# Patient Record
Sex: MALE | Race: WHITE | Employment: FULL TIME | URBAN - METROPOLITAN AREA
[De-identification: names, ages, dates, MRNs, and addresses within clinical notes are randomized per-mention and may not be internally consistent; named-entity substitution may affect disease eponyms.]

---

## 2018-01-15 NOTE — PROGRESS NOTES
Assessment    1  Well child visit (V20 2) (Z00 129)    Plan  Health Maintenance    · Always use a seat belt and shoulder strap when riding or driving a motor vehicle ;  Status:Complete;   Done: [id number]   · Be sure your child gets at least 8 hours of sleep every night ; Status:Complete;   Done:  87AOL1794   · Begin or continue regular aerobic exercise  Gradually work up to at least 3 sessions of 30  minutes of exercise a week ; Status:Complete;   Done: 72KKG7491   · Brush your teeth freq1 and floss at least once a day ; Status:Complete;   Done:  86FXG2238   · Eat a normal well-balanced diet ; Status:Complete;   Done: 17UVM1770   · Good hand washing is one of the best ways to control the spread of germs ;  Status:Complete;   Done: 85EYD7738   · Protect your child with these gun safety rules ; Status:Complete;   Done: 63FRS6725   · There are many ways to reduce your risk of catching or spreading a sexually transmitted  Infection ; Status:Complete;   Done: 62VXY8696   · To prevent head injury, wear a helmet for any activity where you could be struck on the  head or fall on your head ; Status:Complete;   Done: 24BUN5521   · Use appropriate protective gear for your sport or work ; Status:Complete;   Done:  33AVC4237   · Using a latex condom can help prevent pregnancy  It can also help to prevent the spread  of sexually transmitted infections ; Status:Complete;   Done: 32QBZ9317   · When and how to use a seat belt for a child ; Status:Complete;   Done: 98WMF6325    Discussion/Summary    Impression:   No growth, development, elimination, feeding, skin and sleep concerns  no medical problems  Anticipatory guidance addressed as per the history of present illness section  No vaccines needed  vyvanse 50mg No medication changes  Information discussed with patient and Parent/Guardian       53-year-old boy here for school physical annual examination  Only active problems ADHD for which she takes medication  Otherwise there are no express peripheral concerns and no express concerns on the part of the patient  His examination is normal  His immunizations are up-to-date  He has no restrictions or limitations  Chief Complaint  School Physical 14year old male      History of Present Illness  HM, 12-18 years Male (Brief): Edward Reyes presents today for routine health maintenance with his Patient here alone  Social History: He lives with his mother, father, 3 brothers and 2 sisters  His parents are   father has full custody  mom works outside the home  dad works outside the home  General Health: The last health maintenance visit was 2 years ago  The child's health since the last visit is described as good   no illness since last visit  Dental hygiene: Good  Immunization status: Up to date  Caregiver concerns:   Caregivers deny concerns regarding nutrition, sleep, behavior, school, development and elimination  Nutrition/Elimination:   Diet:  his current diet is diverse and healthy  The patient does not use dietary supplements  No elimination issues are expressed  Sleep:  No sleep issues are reported  Behavior: The child's temperament is described as independent  No behavior issues identified  Health Risks:  No significant risk factors are identified  Safety elements used:   safety elements were discussed and are adequate  Weekly activity: Plays basketball hour(s) of exercise per day and he gets exercise 5 times per week  Childcare/School: The child stays home alone  He is in grade 11 in high school  School performance has been good  Sports Participation Questions:   HPI: 49-year-old boy here for school physical annual physical  Only active problems ADHD he is taking medication  He has no concerns about his health and he does not express any parental concerns       Review of Systems    Constitutional: No complaints of tiredness, feels well, no fever, no chills, no recent weight gain or loss     Eyes: No complaints of eye pain, no discharge from eyes, no eyesight problems, eyes do not itch, no red or dry eyes  ENT: no complaints of nasal discharge, no earache, no loss of hearing, no hoarseness or sore throat, no nosebleeds  Cardiovascular: No complaints of chest pain, no palpitations, normal heart rate, no leg claudication or lower leg edema  Respiratory: No complaints of shortness of breath, no wheezing or cough, no dyspnea on exertion  Gastrointestinal: No complaints of abdominal pain, no nausea or vomiting, no constipation, no diarrhea or bloody stools  Genitourinary: No complaints of testicular pain, no dysuria or nocturia, no incontinence, no hesitancy, no gential lesion  Musculoskeletal: No complaints of joint stiffness or swelling, no myalgias, no limb pain or swelling  Integumentary: No complaints of skin rash, no skin lesions or wounds, no itching, no dry skin  Neurological: No complaints of headache, no numbness or tingling, no dizziness or fainting, no confusion, no convulsions, no limb weakness or difficulty walking  Psychiatric: No complaints of feeling depressed, no suicidal thoughts, no emotional problems, no anxiety, no sleep disturbances or changes in personality  Endocrine: No complaints of muscle weakness, no feelings of weakness, no erectile dysfunction, no deepening of voice, no hot flashes or proptosis  Hematologic/Lymphatic: No complaints of swollen glands, no neck swollen glands, does not bleed or bruise easily  ROS reported by the patient  Active Problems    1   Attention deficit disorder without hyperactivity (314 00) (F90 0)    Past Medical History    · History of Contact dermatitis due to poison ivy (692 6) (L23 7)   · History of Difficulty breathing (786 09) (R06 89)   · History of Finger Sprain (842 13)   · History of fracture of phalanx of toe (V15 51) (Z87 81)   · History of Toe injury (959 7) (Z04 950A)    Family History    · No pertinent family history · No pertinent family history    · Family history of diabetes mellitus (V18 0) (Z83 3)    Social History    · Never A Smoker   · Single   · Student    Current Meds   1  Vyvanse 50 MG Oral Capsule; Therapy: 97HTT3942 to (Last Rx:06Jan2012)  Requested for: 98RDO6838 Ordered    Allergies    1  No Known Drug Allergies    Vitals   Recorded: 20POL3528 03:26PM   Temperature 98 6 F, Tympanic   Heart Rate 60, L Radial   Pulse Quality Regular   Systolic 071, LUE, Sitting   Diastolic 66, LUE, Sitting   Height 5 ft 10 5 in   Weight 169 lb    BMI Calculated 23 91   BSA Calculated 1 96     Physical Exam    Constitutional - General appearance: No acute distress, well appearing and well nourished  Head and Face - Head and face: Normocephalic, atraumatic  Palpation of the face and sinuses: Normal, no sinus tenderness  Eyes - Conjunctiva and lids: No injection, edema or discharge  Pupils and irises: Equal, round, reactive to light bilaterally  Ears, Nose, Mouth, and Throat - External inspection of ears and nose: Normal without deformities or discharge  Otoscopic examination: Tympanic membranes gray, translucent with good bony landmarks and light reflex  Canals patent without erythema  Hearing: Normal  Nasal mucosa, septum, and turbinates: Normal, no edema or discharge  Lips, teeth, and gums: Normal, good dentition  Oropharynx: Moist mucosa, normal tongue and tonsils without lesions  Neck - Neck: Supple, symmetric, no masses  Thyroid: No thyromegaly  Pulmonary - Respiratory effort: Normal respiratory rate and rhythm, no increased work of breathing  Percussion of chest: Normal  Palpation of chest: Normal  Auscultation of lungs: Clear bilaterally  Cardiovascular - Palpation of heart: Normal PMI, no thrill  Auscultation of heart: Regular rate and rhythm, normal S1 and S2, no murmur  Carotid pulses: Normal, 2+ bilaterally  Abdominal aorta: Normal  Femoral pulses: Normal, 2+ bilaterally  Pedal pulses: Normal, 2+ bilaterally  Peripheral vascular exam: Normal  Examination of extremities for edema and/or varicosities: Normal    Chest - Breasts: Normal  Palpation of breasts and axillae: Normal  Chest: Normal    Abdomen - Abdomen: Normal bowel sounds, soft, non-tender, no masses  Liver and spleen: No hepatomegaly or splenomegaly  Examination for hernias: No hernias palpated  Genitourinary - Scrotal contents: Normal, no masses appreciated  Penis: Normal, no lesions  Digital rectal exam of prostate: Normal size, no masses  Lymphatic - Palpation of lymph nodes in neck: No anterior or posterior cervical lymphadenopathy  Palpation of lymph nodes in axillae: No lymphadenopathy  Palpation of lymph nodes in groin: No lymphadenopathy  Palpation of lymph nodes in other areas: No lymphadenopathy  Musculoskeletal - Gait and station: Normal gait  Digits and nails: Normal without clubbing or cyanosis  Inspection/palpation of joints, bones, and muscles: Normal  Evaluation for scoliosis: No scoliosis on exam  Range of motion: Normal  Stability: No joint instability  Muscle strength/tone: Normal    Skin - Skin and subcutaneous tissue: No rash or lesions  Palpation of skin and subcutaneous tissue: Normal    Neurologic - Cranial nerves: Normal  Cortical function: Normal  Reflexes: Normal  Sensation: Normal    Psychiatric - judgment and insight: Normal  Orientation to person, place, and time: Normal  Recent and remote memory: Normal  Mood and affect: Normal       Results/Data  Urine Dip Non-Automated- POC [id number] 03:49PM Radha Chinchilla     Test Name Result Flag Reference   Color Yellow     Clarity Transparent     Leukocytes neg  Nitrite neg  Blood neg  Bilirubin neg  Urobilinogen normal     Protein neg  Ph 5 0     Specific Gravity 1 030     Ketone neg  Glucose neg           Procedure    Procedure:   Results: 20/25 in both eyes without corrective device, 20/25 in the right eye without corrective device, 20/25 in the left eye without corrective device      Signatures   Electronically signed by : GRECIA Keane ; Feb 1 2016  4:14PM EST                       (Author)

## 2018-01-19 ENCOUNTER — OFFICE VISIT (OUTPATIENT)
Dept: URGENT CARE | Facility: CLINIC | Age: 18
End: 2018-01-19
Payer: COMMERCIAL

## 2018-01-19 PROCEDURE — 87430 STREP A AG IA: CPT

## 2018-01-19 PROCEDURE — 99203 OFFICE O/P NEW LOW 30 MIN: CPT
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
## 2018-01-23 VITALS
HEART RATE: 82 BPM | DIASTOLIC BLOOD PRESSURE: 70 MMHG | TEMPERATURE: 97.3 F | RESPIRATION RATE: 16 BRPM | HEIGHT: 71 IN | BODY MASS INDEX: 24.5 KG/M2 | SYSTOLIC BLOOD PRESSURE: 122 MMHG | OXYGEN SATURATION: 98 % | WEIGHT: 175 LBS

## 2018-09-25 ENCOUNTER — OFFICE VISIT (OUTPATIENT)
Dept: FAMILY MEDICINE CLINIC | Facility: CLINIC | Age: 18
End: 2018-09-25

## 2018-09-25 VITALS
WEIGHT: 169 LBS | HEART RATE: 82 BPM | TEMPERATURE: 97.4 F | DIASTOLIC BLOOD PRESSURE: 72 MMHG | HEIGHT: 71 IN | BODY MASS INDEX: 23.66 KG/M2 | OXYGEN SATURATION: 97 % | SYSTOLIC BLOOD PRESSURE: 120 MMHG
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
## 2018-09-25 DIAGNOSIS — S09.93XA FACIAL INJURY, INITIAL ENCOUNTER: Primary | ICD-10-CM

## 2018-09-25 PROCEDURE — 99213 OFFICE O/P EST LOW 20 MIN: CPT | Performed by: FAMILY MEDICINE

## 2018-09-25 RX ORDER — IBUPROFEN 600 MG/1
600 TABLET ORAL AS NEEDED
COMMUNITY
Start: 2018-09-24 | End: 2018-11-07

## 2018-09-25 RX ORDER — PENICILLIN V POTASSIUM 500 MG/1
TABLET ORAL EVERY 8 HOURS SCHEDULED
COMMUNITY
Start: 2018-09-24 | End: 2018-10-05

## 2018-09-25 RX ORDER — CYCLOBENZAPRINE HCL 10 MG
10 TABLET ORAL AS NEEDED
COMMUNITY
Start: 2018-09-24 | End: 2018-11-07
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
## 2018-10-03 ENCOUNTER — OFFICE VISIT (OUTPATIENT)
Dept: FAMILY MEDICINE CLINIC | Facility: CLINIC | Age: 18
End: 2018-10-03
Payer: OTHER MISCELLANEOUS

## 2018-10-03 VITALS
SYSTOLIC BLOOD PRESSURE: 106 MMHG | BODY MASS INDEX: 23.8 KG/M2 | RESPIRATION RATE: 10 BRPM | HEART RATE: 72 BPM | HEIGHT: 71 IN | WEIGHT: 170 LBS | DIASTOLIC BLOOD PRESSURE: 78 MMHG

## 2018-10-03 DIAGNOSIS — Z48.02 VISIT FOR SUTURE REMOVAL: ICD-10-CM

## 2018-10-03 DIAGNOSIS — S01.81XD FACIAL LACERATION, SUBSEQUENT ENCOUNTER: Primary | ICD-10-CM

## 2018-10-03 PROCEDURE — 99213 OFFICE O/P EST LOW 20 MIN: CPT | Performed by: FAMILY MEDICINE
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
## 2018-10-08 ENCOUNTER — TELEPHONE (OUTPATIENT)
Dept: FAMILY MEDICINE CLINIC | Facility: CLINIC | Age: 18
End: 2018-10-08

## 2018-10-08 NOTE — TELEPHONE ENCOUNTER
Yamileth Iniguez from [facility], [facility]  insurance received a fax of the office visits from 9/25 and 10/3 would like to know what the patient's duty status is as of appointment on 9/25 and 10/3  Yamileth Iniguez can be reached at [phone] or fax the duty status to [phone]

## 2018-11-03 ENCOUNTER — OFFICE VISIT (OUTPATIENT)
Dept: FAMILY MEDICINE CLINIC | Facility: CLINIC | Age: 18
End: 2018-11-03
Payer: COMMERCIAL

## 2018-11-03 VITALS
SYSTOLIC BLOOD PRESSURE: 140 MMHG | HEART RATE: 97 BPM | DIASTOLIC BLOOD PRESSURE: 80 MMHG | BODY MASS INDEX: 21.14 KG/M2 | WEIGHT: 151.6 LBS | TEMPERATURE: 98.3 F

## 2018-11-03 DIAGNOSIS — R35.0 FREQUENT URINATION: ICD-10-CM

## 2018-11-03 DIAGNOSIS — R53.83 FATIGUE, UNSPECIFIED TYPE: Primary | ICD-10-CM

## 2018-11-03 DIAGNOSIS — R10.13 EPIGASTRIC PAIN: ICD-10-CM

## 2018-11-03 PROCEDURE — 99214 OFFICE O/P EST MOD 30 MIN: CPT | Performed by: NURSE PRACTITIONER

## 2018-11-03 RX ORDER — OMEPRAZOLE 20 MG/1
20 CAPSULE, DELAYED RELEASE ORAL DAILY
Qty: 14 CAPSULE | Refills: 0 | Status: SHIPPED | OUTPATIENT
Start: 2018-11-03 | End: 2018-11-07

## 2018-11-03 NOTE — PROGRESS NOTES
Assessment/Plan   Diagnoses and all orders for this visit:    Fatigue, unspecified type  -     CBC and differential; Future  -     Comprehensive metabolic panel; Future  -     Lyme Antibody Profile with reflex to WB; Future  -     Sedimentation rate, automated; Future  -     TSH, 3rd generation with Free T4 reflex; Future  -     Vitamin B12; Future  -     US abdomen complete; Future  -     CBC and differential  -     Comprehensive metabolic panel  -     Lyme Antibody Profile with reflex to WB  -     Sedimentation rate, automated  -     Vitamin B12  -     TSH, 3rd generation with Free T4 reflex; Future  -     TSH, 3rd generation with Free T4 reflex    Epigastric pain  -     CBC and differential; Future  -     Comprehensive metabolic panel; Future  -     Amylase; Future  -     Lipase; Future  -     CBC and differential  -     Comprehensive metabolic panel  -     Amylase  -     Lipase  -     omeprazole (PriLOSEC) 20 mg delayed release capsule; Take 1 capsule (20 mg total) by mouth daily for 14 days    Frequent urination  -     HEMOGLOBIN A1C W/ EAG ESTIMATION; Future  -     HEMOGLOBIN A1C W/ EAG ESTIMATION        Chief Complaint   Patient presents with    Fatigue     Always tired, dizzy, nausea for 2 weeks  When he belches chest hurts  Subjective   Patient ID: Chris Lopez is a 19 y o  male  Vitals:    11/03/18 1007   BP: 140/80   Pulse: 97   Temp: 98 3 °F (36 8 °C)     Heartburn   He complains of abdominal pain, heartburn and nausea  He reports no belching, no chest pain, no choking, no coughing, no dysphagia, no early satiety, no globus sensation, no sore throat, no stridor, no tooth decay, no water brash or no wheezing  This is a new problem  Episode onset: 2 weeks  The problem occurs frequently  The problem has been unchanged  Heartburn duration: occurs with belching then dissipates  The heartburn is located in the substernum  The heartburn is of moderate intensity   The heartburn does not wake him from sleep  The heartburn does not limit his activity  The heartburn doesn't change with position  Exacerbated by: belching  Associated symptoms include fatigue and weight loss  Pertinent negatives include no melena  There are no known risk factors  He has tried nothing for the symptoms  none  Fatigue   This is a new problem  Episode onset: 2 weeks  The problem occurs constantly  The problem has been unchanged  Associated symptoms include abdominal pain, a change in bowel habit (constipated - reports 1 stool this week), fatigue, nausea and vertigo  Pertinent negatives include no arthralgias, chest pain, coughing, fever, joint swelling, myalgias, rash, sore throat, swollen glands, urinary symptoms, visual change, vomiting or weakness  Nothing aggravates the symptoms  He has tried nothing for the symptoms  The following portions of the patient's history were reviewed and updated as appropriate: allergies, current medications, past family history, past social history, past surgical history and problem list     Review of Systems   Constitutional: Positive for fatigue, unexpected weight change and weight loss  Negative for fever  HENT: Negative  Negative for sore throat  Eyes: Negative  Respiratory: Negative  Negative for cough, choking and wheezing  Cardiovascular: Negative  Negative for chest pain and palpitations  Gastrointestinal: Positive for abdominal pain, change in bowel habit (constipated - reports 1 stool this week), constipation, heartburn and nausea  Negative for dysphagia, melena and vomiting  Endocrine: Positive for polyuria  Genitourinary: Negative  Negative for discharge, dysuria, flank pain, frequency, hematuria, penile pain, testicular pain and urgency  Musculoskeletal: Negative  Negative for arthralgias, joint swelling and myalgias  Skin: Negative  Negative for rash  Allergic/Immunologic: Negative  Neurological: Positive for vertigo  Negative for weakness  Hematological: Negative  Psychiatric/Behavioral: Negative  Objective     Physical Exam   Constitutional: He is oriented to person, place, and time  He appears well-developed and well-nourished  No distress  HENT:   Head: Normocephalic and atraumatic  Right Ear: External ear normal    Left Ear: External ear normal    Nose: Nose normal    Mouth/Throat: Oropharynx is clear and moist  No oropharyngeal exudate  Eyes: Pupils are equal, round, and reactive to light  Conjunctivae and EOM are normal  Right eye exhibits no discharge  Left eye exhibits no discharge  Neck: Normal range of motion  Neck supple  No thyromegaly present  Cardiovascular: Normal rate, regular rhythm, normal heart sounds and intact distal pulses  No murmur heard  Pulmonary/Chest: Effort normal and breath sounds normal  No respiratory distress  Abdominal: Soft  Bowel sounds are normal  He exhibits no distension, no pulsatile liver, no fluid wave, no abdominal bruit, no ascites, no pulsatile midline mass and no mass  There is no hepatosplenomegaly, splenomegaly or hepatomegaly  There is tenderness in the epigastric area  There is no rigidity, no rebound, no guarding, no CVA tenderness, no tenderness at McBurney's point and negative Yates's sign  No hernia  Hernia confirmed negative in the ventral area  Musculoskeletal: Normal range of motion  He exhibits no edema or tenderness  Lymphadenopathy:     He has no cervical adenopathy  Neurological: He is alert and oriented to person, place, and time  No cranial nerve deficit  Coordination normal    Skin: Skin is warm and dry  Capillary refill takes less than 2 seconds  He is not diaphoretic  No erythema  No pallor  Psychiatric: He has a normal mood and affect  His behavior is normal  Judgment and thought content normal    Nursing note and vitals reviewed  No Known Allergies  There is no problem list on file for this patient        Current Outpatient Prescriptions:    cyclobenzaprine (FLEXERIL) 10 mg tablet, Take 10 mg by mouth as needed  , Disp: , Rfl:     ibuprofen (MOTRIN) 600 mg tablet, Take 600 mg by mouth as needed  , Disp: , Rfl:     omeprazole (PriLOSEC) 20 mg delayed release capsule, Take 1 capsule (20 mg total) by mouth daily for 14 days, Disp: 14 capsule, Rfl: 0  Social History     Social History    Marital status: Single     Spouse name: N/A    Number of children: N/A    Years of education: N/A     Occupational History    Not on file       Social History Main Topics    Smoking status: Never Smoker    Smokeless tobacco: Never Used    Alcohol use No    Drug use: No    Sexual activity: Not on file     Other Topics Concern    Not on file     Social History Narrative    Single     Student      Family History   Problem Relation Age of Onset    Diabetes Family

## 2018-11-03 NOTE — PATIENT INSTRUCTIONS
1  Obtain labs  2  Schedule US of abdomen  3  Take omeprazole daily   4   Schedule follow up appointment for 2 weeks

## 2018-11-06 ENCOUNTER — HOSPITAL ENCOUNTER (OUTPATIENT)
Dept: ULTRASOUND IMAGING | Facility: CLINIC | Age: 18
Discharge: HOME | End: 2018-11-06
Payer: COMMERCIAL

## 2018-11-06 DIAGNOSIS — R53.83 FATIGUE, UNSPECIFIED TYPE: ICD-10-CM

## 2018-11-06 LAB
ALBUMIN SERPL-MCNC: 5.2 G/DL (ref 3.5–5.5)
ALBUMIN/GLOB SERPL: 2.3 {RATIO} (ref 1.2–2.2)
ALP SERPL-CCNC: 94 IU/L (ref 39–117)
ALT SERPL-CCNC: 17 IU/L (ref 0–44)
AMYLASE SERPL-CCNC: 46 U/L (ref 31–124)
AST SERPL-CCNC: 17 IU/L (ref 0–40)
B BURGDOR IGG+IGM SER-ACNC: <0.91 ISR (ref 0–0.9)
BASOPHILS # BLD AUTO: 0 X10E3/UL (ref 0–0.2)
BASOPHILS NFR BLD AUTO: 0 %
BILIRUB SERPL-MCNC: 0.5 MG/DL (ref 0–1.2)
BUN SERPL-MCNC: 11 MG/DL (ref 6–20)
BUN/CREAT SERPL: 9 (ref 9–20)
CALCIUM SERPL-MCNC: 10.4 MG/DL (ref 8.7–10.2)
CHLORIDE SERPL-SCNC: 95 MMOL/L (ref 96–106)
CO2 SERPL-SCNC: 7 MMOL/L (ref 20–29)
CREAT SERPL-MCNC: 1.24 MG/DL (ref 0.76–1.27)
EOSINOPHIL # BLD AUTO: 0 X10E3/UL (ref 0–0.4)
EOSINOPHIL NFR BLD AUTO: 0 %
ERYTHROCYTE [DISTWIDTH] IN BLOOD BY AUTOMATED COUNT: 13.4 % (ref 12.3–15.4)
ERYTHROCYTE [SEDIMENTATION RATE] IN BLOOD BY WESTERGREN METHOD: 7 MM/HR (ref 0–15)
EST. AVERAGE GLUCOSE BLD GHB EST-MCNC: 344 MG/DL
GLOBULIN SER-MCNC: 2.3 G/DL (ref 1.5–4.5)
GLUCOSE SERPL-MCNC: 203 MG/DL (ref 65–99)
HBA1C MFR BLD: 13.6 % (ref 4.8–5.6)
HCT VFR BLD AUTO: 49.8 % (ref 37.5–51)
HGB BLD-MCNC: 18 G/DL (ref 13–17.7)
IMM GRANULOCYTES # BLD: 0 X10E3/UL (ref 0–0.1)
IMM GRANULOCYTES NFR BLD: 1 %
LIPASE SERPL-CCNC: 25 U/L (ref 13–78)
LYMPHOCYTES # BLD AUTO: 1.7 X10E3/UL (ref 0.7–3.1)
LYMPHOCYTES NFR BLD AUTO: 25 %
MCH RBC QN AUTO: 30.7 PG (ref 26.6–33)
MCHC RBC AUTO-ENTMCNC: 36.1 G/DL (ref 31.5–35.7)
MCV RBC AUTO: 85 FL (ref 79–97)
MONOCYTES # BLD AUTO: 0.5 X10E3/UL (ref 0.1–0.9)
MONOCYTES NFR BLD AUTO: 8 %
NEUTROPHILS # BLD AUTO: 4.3 X10E3/UL (ref 1.4–7)
NEUTROPHILS NFR BLD AUTO: 66 %
PLATELET # BLD AUTO: 307 X10E3/UL (ref 150–379)
POTASSIUM SERPL-SCNC: 4 MMOL/L (ref 3.5–5.2)
PROT SERPL-MCNC: 7.5 G/DL (ref 6–8.5)
RBC # BLD AUTO: 5.86 X10E6/UL (ref 4.14–5.8)
SL AMB EGFR AFRICAN AMERICAN: 96 ML/MIN/1.73
SL AMB EGFR NON AFRICAN AMERICAN: 83 ML/MIN/1.73
SODIUM SERPL-SCNC: 130 MMOL/L (ref 134–144)
TSH SERPL DL<=0.005 MIU/L-ACNC: 1.17 UIU/ML (ref 0.45–4.5)
VIT B12 SERPL-MCNC: 682 PG/ML (ref 232–1245)
WBC # BLD AUTO: 6.5 X10E3/UL (ref 3.4–10.8)

## 2018-11-06 PROCEDURE — 3046F HEMOGLOBIN A1C LEVEL >9.0%: CPT | Performed by: NURSE PRACTITIONER

## 2018-11-06 PROCEDURE — 76700 US EXAM ABDOM COMPLETE: CPT

## 2018-11-07 ENCOUNTER — APPOINTMENT (INPATIENT)
Dept: RADIOLOGY | Facility: HOSPITAL | Age: 18
DRG: 638 | End: 2018-11-07
Payer: COMMERCIAL

## 2018-11-07 ENCOUNTER — HOSPITAL ENCOUNTER (INPATIENT)
Facility: HOSPITAL | Age: 18
LOS: 3 days | Discharge: HOME HEALTH | DRG: 638 | End: 2018-11-10
Attending: EMERGENCY MEDICINE | Admitting: INTERNAL MEDICINE
Payer: COMMERCIAL

## 2018-11-07 DIAGNOSIS — E87.6 HYPOKALEMIA: ICD-10-CM

## 2018-11-07 DIAGNOSIS — E11.10 DKA (DIABETIC KETOACIDOSES): Primary | ICD-10-CM

## 2018-11-07 DIAGNOSIS — E10.9 TYPE 1 DIABETES MELLITUS WITHOUT COMPLICATION (HCC): ICD-10-CM

## 2018-11-07 PROBLEM — R53.1 WEAKNESS GENERALIZED: Status: ACTIVE | Noted: 2018-11-07

## 2018-11-07 PROBLEM — E86.9 VOLUME DEPLETION: Status: ACTIVE | Noted: 2018-11-07

## 2018-11-07 PROBLEM — D72.829 LEUKOCYTOSIS: Status: ACTIVE | Noted: 2018-11-07

## 2018-11-07 PROBLEM — R06.02 SHORTNESS OF BREATH: Status: ACTIVE | Noted: 2018-11-07

## 2018-11-07 LAB
ACETONE SERPL-MCNC: ABNORMAL MG/DL
ALBUMIN SERPL BCP-MCNC: 5 G/DL (ref 3.5–5)
ALP SERPL-CCNC: 110 U/L (ref 46–116)
ALT SERPL W P-5'-P-CCNC: 30 U/L (ref 12–78)
ANION GAP SERPL CALCULATED.3IONS-SCNC: 10 MMOL/L (ref 4–13)
ANION GAP SERPL CALCULATED.3IONS-SCNC: 12 MMOL/L (ref 4–13)
ANION GAP SERPL CALCULATED.3IONS-SCNC: 17 MMOL/L (ref 4–13)
ANION GAP SERPL CALCULATED.3IONS-SCNC: 20 MMOL/L (ref 4–13)
ANION GAP SERPL CALCULATED.3IONS-SCNC: 22 MMOL/L (ref 4–13)
ANION GAP SERPL CALCULATED.3IONS-SCNC: 25 MMOL/L (ref 4–13)
ANION GAP SERPL CALCULATED.3IONS-SCNC: 30 MMOL/L (ref 4–13)
AST SERPL W P-5'-P-CCNC: 12 U/L (ref 5–45)
BACTERIA UR QL AUTO: ABNORMAL /HPF
BASE EXCESS BLDA CALC-SCNC: -28 MMOL/L (ref -2–3)
BASOPHILS # BLD MANUAL: 0 THOUSAND/UL (ref 0–0.1)
BASOPHILS NFR MAR MANUAL: 0 % (ref 0–1)
BILIRUB SERPL-MCNC: 0.7 MG/DL (ref 0.2–1)
BILIRUB UR QL STRIP: NEGATIVE
BUN SERPL-MCNC: 12 MG/DL (ref 5–25)
BUN SERPL-MCNC: 14 MG/DL (ref 5–25)
BUN SERPL-MCNC: 15 MG/DL (ref 5–25)
BUN SERPL-MCNC: 17 MG/DL (ref 5–25)
BUN SERPL-MCNC: 20 MG/DL (ref 5–25)
BUN SERPL-MCNC: 22 MG/DL (ref 5–25)
BUN SERPL-MCNC: 23 MG/DL (ref 5–25)
CALCIUM SERPL-MCNC: 11 MG/DL (ref 8.3–10.1)
CALCIUM SERPL-MCNC: 8.3 MG/DL (ref 8.3–10.1)
CALCIUM SERPL-MCNC: 8.5 MG/DL (ref 8.3–10.1)
CALCIUM SERPL-MCNC: 8.7 MG/DL (ref 8.3–10.1)
CALCIUM SERPL-MCNC: 8.9 MG/DL (ref 8.3–10.1)
CALCIUM SERPL-MCNC: 9 MG/DL (ref 8.3–10.1)
CALCIUM SERPL-MCNC: 9.1 MG/DL (ref 8.3–10.1)
CHLORIDE SERPL-SCNC: 101 MMOL/L (ref 100–108)
CHLORIDE SERPL-SCNC: 104 MMOL/L (ref 100–108)
CHLORIDE SERPL-SCNC: 107 MMOL/L (ref 100–108)
CHLORIDE SERPL-SCNC: 89 MMOL/L (ref 100–108)
CHLORIDE SERPL-SCNC: 97 MMOL/L (ref 100–108)
CHLORIDE SERPL-SCNC: 98 MMOL/L (ref 100–108)
CHLORIDE SERPL-SCNC: 98 MMOL/L (ref 100–108)
CLARITY UR: CLEAR
CO2 SERPL-SCNC: 11 MMOL/L (ref 21–32)
CO2 SERPL-SCNC: 12 MMOL/L (ref 21–32)
CO2 SERPL-SCNC: 17 MMOL/L (ref 21–32)
CO2 SERPL-SCNC: 19 MMOL/L (ref 21–32)
CO2 SERPL-SCNC: 7 MMOL/L (ref 21–32)
CO2 SERPL-SCNC: 8 MMOL/L (ref 21–32)
CO2 SERPL-SCNC: 9 MMOL/L (ref 21–32)
COARSE GRAN CASTS URNS QL MICRO: ABNORMAL /LPF
COLOR UR: YELLOW
CREAT SERPL-MCNC: 0.87 MG/DL (ref 0.6–1.3)
CREAT SERPL-MCNC: 0.98 MG/DL (ref 0.6–1.3)
CREAT SERPL-MCNC: 1.05 MG/DL (ref 0.6–1.3)
CREAT SERPL-MCNC: 1.08 MG/DL (ref 0.6–1.3)
CREAT SERPL-MCNC: 1.12 MG/DL (ref 0.6–1.3)
CREAT SERPL-MCNC: 1.25 MG/DL (ref 0.6–1.3)
CREAT SERPL-MCNC: 1.7 MG/DL (ref 0.6–1.3)
EOSINOPHIL # BLD MANUAL: 0 THOUSAND/UL (ref 0–0.4)
EOSINOPHIL NFR BLD MANUAL: 0 % (ref 0–6)
ERYTHROCYTE [DISTWIDTH] IN BLOOD BY AUTOMATED COUNT: 12.7 % (ref 11.6–15.1)
GFR SERPL CREATININE-BSD FRML MDRD: 102 ML/MIN/1.73SQ M
GFR SERPL CREATININE-BSD FRML MDRD: 111 ML/MIN/1.73SQ M
GFR SERPL CREATININE-BSD FRML MDRD: 124 ML/MIN/1.73SQ M
GFR SERPL CREATININE-BSD FRML MDRD: 57 ML/MIN/1.73SQ M
GFR SERPL CREATININE-BSD FRML MDRD: 82 ML/MIN/1.73SQ M
GFR SERPL CREATININE-BSD FRML MDRD: 94 ML/MIN/1.73SQ M
GFR SERPL CREATININE-BSD FRML MDRD: 98 ML/MIN/1.73SQ M
GLUCOSE SERPL-MCNC: 100 MG/DL (ref 65–140)
GLUCOSE SERPL-MCNC: 104 MG/DL (ref 65–140)
GLUCOSE SERPL-MCNC: 109 MG/DL (ref 65–140)
GLUCOSE SERPL-MCNC: 133 MG/DL (ref 65–140)
GLUCOSE SERPL-MCNC: 147 MG/DL (ref 65–140)
GLUCOSE SERPL-MCNC: 155 MG/DL (ref 65–140)
GLUCOSE SERPL-MCNC: 163 MG/DL (ref 65–140)
GLUCOSE SERPL-MCNC: 172 MG/DL (ref 65–140)
GLUCOSE SERPL-MCNC: 177 MG/DL (ref 65–140)
GLUCOSE SERPL-MCNC: 207 MG/DL (ref 65–140)
GLUCOSE SERPL-MCNC: 208 MG/DL (ref 65–140)
GLUCOSE SERPL-MCNC: 232 MG/DL (ref 65–140)
GLUCOSE SERPL-MCNC: 232 MG/DL (ref 65–140)
GLUCOSE SERPL-MCNC: 235 MG/DL (ref 65–140)
GLUCOSE SERPL-MCNC: 236 MG/DL (ref 65–140)
GLUCOSE SERPL-MCNC: 240 MG/DL (ref 65–140)
GLUCOSE SERPL-MCNC: 245 MG/DL (ref 65–140)
GLUCOSE SERPL-MCNC: 248 MG/DL (ref 65–140)
GLUCOSE SERPL-MCNC: 252 MG/DL (ref 65–140)
GLUCOSE SERPL-MCNC: 263 MG/DL (ref 65–140)
GLUCOSE SERPL-MCNC: 264 MG/DL (ref 65–140)
GLUCOSE SERPL-MCNC: 266 MG/DL (ref 65–140)
GLUCOSE SERPL-MCNC: 319 MG/DL (ref 65–140)
GLUCOSE SERPL-MCNC: 432 MG/DL (ref 65–140)
GLUCOSE SERPL-MCNC: 457 MG/DL (ref 65–140)
GLUCOSE UR STRIP-MCNC: ABNORMAL MG/DL
HCO3 BLDA-SCNC: 4.5 MMOL/L (ref 24–30)
HCT VFR BLD AUTO: 50.9 % (ref 36.5–49.3)
HGB BLD-MCNC: 17.6 G/DL (ref 12–17)
HGB UR QL STRIP.AUTO: ABNORMAL
KETONES UR STRIP-MCNC: ABNORMAL MG/DL
LEUKOCYTE ESTERASE UR QL STRIP: ABNORMAL
LIPASE SERPL-CCNC: 123 U/L (ref 73–393)
LYMPHOCYTES # BLD AUTO: 13 % (ref 14–44)
LYMPHOCYTES # BLD AUTO: 2.29 THOUSAND/UL (ref 0.6–4.47)
MAGNESIUM SERPL-MCNC: 2 MG/DL (ref 1.6–2.6)
MAGNESIUM SERPL-MCNC: 2.8 MG/DL (ref 1.6–2.6)
MCH RBC QN AUTO: 30 PG (ref 26.8–34.3)
MCHC RBC AUTO-ENTMCNC: 34.6 G/DL (ref 31.4–37.4)
MCV RBC AUTO: 87 FL (ref 82–98)
MONOCYTES # BLD AUTO: 1.23 THOUSAND/UL (ref 0–1.22)
MONOCYTES NFR BLD: 7 % (ref 4–12)
NEUTROPHILS # BLD MANUAL: 14.09 THOUSAND/UL (ref 1.85–7.62)
NEUTS BAND NFR BLD MANUAL: 10 % (ref 0–8)
NEUTS SEG NFR BLD AUTO: 70 % (ref 43–75)
NITRITE UR QL STRIP: NEGATIVE
NON-SQ EPI CELLS URNS QL MICRO: ABNORMAL /HPF
NRBC BLD AUTO-RTO: 0 /100 WBCS
PCO2 BLD: 24 MM HG (ref 42–50)
PCO2 BLD: 5 MMOL/L (ref 21–32)
PH BLD: 6.88 [PH] (ref 7.3–7.4)
PH UR STRIP.AUTO: 5.5 [PH] (ref 4.5–8)
PHOSPHATE SERPL-MCNC: 1.3 MG/DL (ref 2.7–4.5)
PLATELET # BLD AUTO: 386 THOUSANDS/UL (ref 149–390)
PLATELET BLD QL SMEAR: ADEQUATE
PMV BLD AUTO: 10.6 FL (ref 8.9–12.7)
PO2 BLD: 38 MM HG (ref 35–45)
POTASSIUM SERPL-SCNC: 2.8 MMOL/L (ref 3.5–5.3)
POTASSIUM SERPL-SCNC: 3.1 MMOL/L (ref 3.5–5.3)
POTASSIUM SERPL-SCNC: 3.6 MMOL/L (ref 3.5–5.3)
POTASSIUM SERPL-SCNC: 3.9 MMOL/L (ref 3.5–5.3)
POTASSIUM SERPL-SCNC: 4 MMOL/L (ref 3.5–5.3)
POTASSIUM SERPL-SCNC: 4.1 MMOL/L (ref 3.5–5.3)
POTASSIUM SERPL-SCNC: 4.1 MMOL/L (ref 3.5–5.3)
PROT SERPL-MCNC: 8.2 G/DL (ref 6.4–8.2)
PROT UR STRIP-MCNC: ABNORMAL MG/DL
RBC # BLD AUTO: 5.86 MILLION/UL (ref 3.88–5.62)
RBC #/AREA URNS AUTO: ABNORMAL /HPF
RBC MORPH BLD: NORMAL
SAO2 % BLD FROM PO2: 40 % (ref 95–98)
SODIUM SERPL-SCNC: 127 MMOL/L (ref 136–145)
SODIUM SERPL-SCNC: 129 MMOL/L (ref 136–145)
SODIUM SERPL-SCNC: 130 MMOL/L (ref 136–145)
SODIUM SERPL-SCNC: 133 MMOL/L (ref 136–145)
SODIUM SERPL-SCNC: 136 MMOL/L (ref 136–145)
SP GR UR STRIP.AUTO: >=1.03 (ref 1–1.03)
SPECIMEN SOURCE: ABNORMAL
TOTAL CELLS COUNTED SPEC: 100
UROBILINOGEN UR QL STRIP.AUTO: 0.2 E.U./DL
WBC # BLD AUTO: 17.61 THOUSAND/UL (ref 4.31–10.16)
WBC #/AREA URNS AUTO: ABNORMAL /HPF

## 2018-11-07 PROCEDURE — 80053 COMPREHEN METABOLIC PANEL: CPT | Performed by: EMERGENCY MEDICINE

## 2018-11-07 PROCEDURE — 96374 THER/PROPH/DIAG INJ IV PUSH: CPT

## 2018-11-07 PROCEDURE — 84100 ASSAY OF PHOSPHORUS: CPT | Performed by: INTERNAL MEDICINE

## 2018-11-07 PROCEDURE — 82803 BLOOD GASES ANY COMBINATION: CPT

## 2018-11-07 PROCEDURE — 80048 BASIC METABOLIC PNL TOTAL CA: CPT | Performed by: INTERNAL MEDICINE

## 2018-11-07 PROCEDURE — 87040 BLOOD CULTURE FOR BACTERIA: CPT | Performed by: EMERGENCY MEDICINE

## 2018-11-07 PROCEDURE — 83735 ASSAY OF MAGNESIUM: CPT | Performed by: EMERGENCY MEDICINE

## 2018-11-07 PROCEDURE — 36415 COLL VENOUS BLD VENIPUNCTURE: CPT | Performed by: EMERGENCY MEDICINE

## 2018-11-07 PROCEDURE — 81001 URINALYSIS AUTO W/SCOPE: CPT | Performed by: EMERGENCY MEDICINE

## 2018-11-07 PROCEDURE — 99285 EMERGENCY DEPT VISIT HI MDM: CPT

## 2018-11-07 PROCEDURE — 96365 THER/PROPH/DIAG IV INF INIT: CPT

## 2018-11-07 PROCEDURE — 99291 CRITICAL CARE FIRST HOUR: CPT | Performed by: INTERNAL MEDICINE

## 2018-11-07 PROCEDURE — 99254 IP/OBS CNSLTJ NEW/EST MOD 60: CPT | Performed by: INTERNAL MEDICINE

## 2018-11-07 PROCEDURE — 96361 HYDRATE IV INFUSION ADD-ON: CPT

## 2018-11-07 PROCEDURE — 85007 BL SMEAR W/DIFF WBC COUNT: CPT | Performed by: EMERGENCY MEDICINE

## 2018-11-07 PROCEDURE — 83690 ASSAY OF LIPASE: CPT | Performed by: EMERGENCY MEDICINE

## 2018-11-07 PROCEDURE — 82948 REAGENT STRIP/BLOOD GLUCOSE: CPT

## 2018-11-07 PROCEDURE — 71045 X-RAY EXAM CHEST 1 VIEW: CPT

## 2018-11-07 PROCEDURE — 96375 TX/PRO/DX INJ NEW DRUG ADDON: CPT

## 2018-11-07 PROCEDURE — 85027 COMPLETE CBC AUTOMATED: CPT | Performed by: EMERGENCY MEDICINE

## 2018-11-07 PROCEDURE — 80048 BASIC METABOLIC PNL TOTAL CA: CPT | Performed by: EMERGENCY MEDICINE

## 2018-11-07 PROCEDURE — 82009 KETONE BODYS QUAL: CPT | Performed by: EMERGENCY MEDICINE

## 2018-11-07 PROCEDURE — 83735 ASSAY OF MAGNESIUM: CPT | Performed by: INTERNAL MEDICINE

## 2018-11-07 RX ORDER — MAGNESIUM SULFATE HEPTAHYDRATE 40 MG/ML
4 INJECTION, SOLUTION INTRAVENOUS ONCE
Status: DISCONTINUED | OUTPATIENT
Start: 2018-11-07 | End: 2018-11-07

## 2018-11-07 RX ORDER — ONDANSETRON 2 MG/ML
4 INJECTION INTRAMUSCULAR; INTRAVENOUS EVERY 4 HOURS PRN
Status: DISCONTINUED | OUTPATIENT
Start: 2018-11-07 | End: 2018-11-10 | Stop reason: HOSPADM

## 2018-11-07 RX ORDER — DEXTROSE, SODIUM CHLORIDE, AND POTASSIUM CHLORIDE 5; .9; .15 G/100ML; G/100ML; G/100ML
250 INJECTION INTRAVENOUS CONTINUOUS
Status: DISCONTINUED | OUTPATIENT
Start: 2018-11-07 | End: 2018-11-08

## 2018-11-07 RX ORDER — ONDANSETRON 2 MG/ML
4 INJECTION INTRAMUSCULAR; INTRAVENOUS ONCE
Status: COMPLETED | OUTPATIENT
Start: 2018-11-07 | End: 2018-11-07

## 2018-11-07 RX ORDER — SODIUM CHLORIDE 9 MG/ML
1000 INJECTION, SOLUTION INTRAVENOUS CONTINUOUS
Status: DISPENSED | OUTPATIENT
Start: 2018-11-07 | End: 2018-11-07

## 2018-11-07 RX ORDER — ACETAMINOPHEN 325 MG/1
975 TABLET ORAL ONCE
Status: COMPLETED | OUTPATIENT
Start: 2018-11-07 | End: 2018-11-07

## 2018-11-07 RX ORDER — POTASSIUM CHLORIDE 14.9 MG/ML
20 INJECTION INTRAVENOUS
Status: COMPLETED | OUTPATIENT
Start: 2018-11-07 | End: 2018-11-07

## 2018-11-07 RX ORDER — MORPHINE SULFATE 4 MG/ML
4 INJECTION, SOLUTION INTRAMUSCULAR; INTRAVENOUS ONCE
Status: COMPLETED | OUTPATIENT
Start: 2018-11-07 | End: 2018-11-07

## 2018-11-07 RX ORDER — SODIUM CHLORIDE 9 MG/ML
250 INJECTION, SOLUTION INTRAVENOUS CONTINUOUS
Status: DISPENSED | OUTPATIENT
Start: 2018-11-07 | End: 2018-11-07

## 2018-11-07 RX ORDER — DIAZEPAM 5 MG/ML
5 INJECTION, SOLUTION INTRAMUSCULAR; INTRAVENOUS ONCE
Status: COMPLETED | OUTPATIENT
Start: 2018-11-07 | End: 2018-11-07

## 2018-11-07 RX ORDER — BISACODYL 10 MG
10 SUPPOSITORY, RECTAL RECTAL DAILY PRN
Status: DISCONTINUED | OUTPATIENT
Start: 2018-11-07 | End: 2018-11-10 | Stop reason: HOSPADM

## 2018-11-07 RX ORDER — SODIUM CHLORIDE AND POTASSIUM CHLORIDE .9; .15 G/100ML; G/100ML
500 SOLUTION INTRAVENOUS ONCE
Status: DISCONTINUED | OUTPATIENT
Start: 2018-11-07 | End: 2018-11-07

## 2018-11-07 RX ORDER — DEXTROSE AND SODIUM CHLORIDE 5; .9 G/100ML; G/100ML
250 INJECTION, SOLUTION INTRAVENOUS CONTINUOUS
Status: DISCONTINUED | OUTPATIENT
Start: 2018-11-07 | End: 2018-11-07

## 2018-11-07 RX ORDER — SODIUM CHLORIDE 9 MG/ML
500 INJECTION, SOLUTION INTRAVENOUS CONTINUOUS
Status: DISPENSED | OUTPATIENT
Start: 2018-11-07 | End: 2018-11-07

## 2018-11-07 RX ORDER — SODIUM CHLORIDE AND POTASSIUM CHLORIDE .9; .15 G/100ML; G/100ML
1000 SOLUTION INTRAVENOUS ONCE
Status: COMPLETED | OUTPATIENT
Start: 2018-11-07 | End: 2018-11-07

## 2018-11-07 RX ORDER — MAGNESIUM SULFATE HEPTAHYDRATE 40 MG/ML
2 INJECTION, SOLUTION INTRAVENOUS
Status: ACTIVE | OUTPATIENT
Start: 2018-11-07 | End: 2018-11-07

## 2018-11-07 RX ORDER — ACETAMINOPHEN 325 MG/1
650 TABLET ORAL EVERY 6 HOURS PRN
Status: DISCONTINUED | OUTPATIENT
Start: 2018-11-07 | End: 2018-11-10 | Stop reason: HOSPADM

## 2018-11-07 RX ORDER — POTASSIUM CHLORIDE 14.9 MG/ML
20 INJECTION INTRAVENOUS
Status: COMPLETED | OUTPATIENT
Start: 2018-11-07 | End: 2018-11-08

## 2018-11-07 RX ORDER — 0.9 % SODIUM CHLORIDE 0.9 %
3 VIAL (ML) INJECTION AS NEEDED
Status: DISCONTINUED | OUTPATIENT
Start: 2018-11-07 | End: 2018-11-10 | Stop reason: HOSPADM

## 2018-11-07 RX ORDER — MAGNESIUM SULFATE HEPTAHYDRATE 40 MG/ML
2 INJECTION, SOLUTION INTRAVENOUS ONCE
Status: DISCONTINUED | OUTPATIENT
Start: 2018-11-07 | End: 2018-11-07

## 2018-11-07 RX ADMIN — DEXTROSE AND SODIUM CHLORIDE 250 ML/HR: 5; .9 INJECTION, SOLUTION INTRAVENOUS at 08:28

## 2018-11-07 RX ADMIN — POTASSIUM CHLORIDE 20 MEQ: 200 INJECTION, SOLUTION INTRAVENOUS at 21:23

## 2018-11-07 RX ADMIN — ONDANSETRON 4 MG: 2 INJECTION, SOLUTION INTRAMUSCULAR; INTRAVENOUS at 03:31

## 2018-11-07 RX ADMIN — POTASSIUM CHLORIDE 20 MEQ: 200 INJECTION, SOLUTION INTRAVENOUS at 14:53

## 2018-11-07 RX ADMIN — SODIUM CHLORIDE 1000 ML: 0.9 INJECTION, SOLUTION INTRAVENOUS at 03:31

## 2018-11-07 RX ADMIN — POTASSIUM CHLORIDE 20 MEQ: 200 INJECTION, SOLUTION INTRAVENOUS at 09:54

## 2018-11-07 RX ADMIN — Medication 6 UNITS/HR: at 04:53

## 2018-11-07 RX ADMIN — DEXTROSE, SODIUM CHLORIDE, AND POTASSIUM CHLORIDE 250 ML/HR: 5; .9; .15 INJECTION INTRAVENOUS at 18:59

## 2018-11-07 RX ADMIN — SODIUM BICARBONATE 500 ML/HR: 84 INJECTION, SOLUTION INTRAVENOUS at 05:33

## 2018-11-07 RX ADMIN — ONDANSETRON 4 MG: 2 INJECTION, SOLUTION INTRAMUSCULAR; INTRAVENOUS at 12:28

## 2018-11-07 RX ADMIN — DEXTROSE, SODIUM CHLORIDE, AND POTASSIUM CHLORIDE 250 ML/HR: 5; .9; .15 INJECTION INTRAVENOUS at 14:55

## 2018-11-07 RX ADMIN — INSULIN HUMAN 6 UNITS: 100 INJECTION, SOLUTION PARENTERAL at 04:42

## 2018-11-07 RX ADMIN — POTASSIUM CHLORIDE 20 MEQ: 200 INJECTION, SOLUTION INTRAVENOUS at 12:20

## 2018-11-07 RX ADMIN — ACETAMINOPHEN 650 MG: 325 TABLET, FILM COATED ORAL at 16:27

## 2018-11-07 RX ADMIN — Medication 6 UNITS/HR: at 19:45

## 2018-11-07 RX ADMIN — POTASSIUM PHOSPHATE, MONOBASIC AND POTASSIUM PHOSPHATE, DIBASIC 21 MMOL: 224; 236 INJECTION, SOLUTION INTRAVENOUS at 10:51

## 2018-11-07 RX ADMIN — Medication 5 MG: at 21:22

## 2018-11-07 RX ADMIN — FAMOTIDINE 20 MG: 10 INJECTION, SOLUTION INTRAVENOUS at 08:28

## 2018-11-07 RX ADMIN — MORPHINE SULFATE 4 MG: 4 INJECTION INTRAVENOUS at 03:35

## 2018-11-07 RX ADMIN — DEXTROSE, SODIUM CHLORIDE, AND POTASSIUM CHLORIDE 250 ML/HR: 5; .9; .15 INJECTION INTRAVENOUS at 10:35

## 2018-11-07 RX ADMIN — SODIUM CHLORIDE AND POTASSIUM CHLORIDE 1000 ML/HR: .9; .15 SOLUTION INTRAVENOUS at 05:05

## 2018-11-07 RX ADMIN — ACETAMINOPHEN 975 MG: 325 TABLET, FILM COATED ORAL at 06:13

## 2018-11-07 RX ADMIN — DEXTROSE, SODIUM CHLORIDE, AND POTASSIUM CHLORIDE 250 ML/HR: 5; .9; .15 INJECTION INTRAVENOUS at 23:07

## 2018-11-07 RX ADMIN — POTASSIUM CHLORIDE 20 MEQ: 200 INJECTION, SOLUTION INTRAVENOUS at 23:02

## 2018-11-07 NOTE — PROGRESS NOTES
Per Dr Michelle Tiwari, only administer ordered IV Magnesium if Mg level becomes less than or equal to 1 4

## 2018-11-07 NOTE — H&P
H&P Exam - Mayur Sorensen 19 y o  male MRN: [id number]    Unit/Bed#: ED 09 Encounter: [id number]        History of Present Illness     HPI:  Mayur Sorensen is a 19 y o  male with no known past medical history who presents with back pain, nausea vomiting and abdominal pain  Patient states he has not been feeling well for the last 2 weeks but in the last 3-4 days he has been feeling weaker  He has been having this persistent nausea vomiting for the last 2 weeks with associated intermittent abdominal pain  He also has loss of appetite  Patient admits to polyuria and polydipsia  He has lost about 20 lbs of weight within this period as well  No fevers he did have some chills  No hematemesis or melena  He also feels short of breath and has some mild cough  He states he has not had any bowel movement for about a week  Today he developed severe back pain which he states is mostly in the spine but then points to the left flank region  No known history of kidney stones  No dysuria no hematuria  He was seen few days ago add the [facility] and apparently at that time labs were done and he had a serum bicarb of 7 with a glucose of 203  Hemoglobin A1c was noted to be about 13  6  He also had an abdominal ultrasound done which was unremarkable  No family history of diabetes  No history of tired with mumps infection  No new medications  Historical Information   Past Medical History:   Diagnosis Date    Fracture of phalanx of toe     Resolved 2/1/2016      Patient Active Problem List   Diagnosis    Shortness of breath    DKA (diabetic ketoacidoses) (HCC)    Weakness generalized    Volume depletion    Leukocytosis     History reviewed  No pertinent surgical history      Social History   History   Alcohol Use No     History   Drug Use No     History   Smoking Status    Never Smoker   Smokeless Tobacco    Never Used       Family History:   Family History   Problem Relation Age of Onset    Diabetes Family        Meds/Allergies       Current Facility-Administered Medications:     insulin regular (HumuLIN R,NovoLIN R) 1 Units/mL in sodium chloride 0 9 % 100 mL infusion, 6 Units/hr, Intravenous, Continuous, Leanna Yarbrough DO, Last Rate: 6 mL/hr at 11/07/18 0453, 6 Units/hr at 11/07/18 0453    Insert peripheral IV, , , Once **AND** sodium chloride (PF) 0 9 % injection 3 mL, 3 mL, Intravenous, PRN, Thang Landon DO  No current outpatient prescriptions on file  No Known Allergies    Review of Systems   Constitutional: Positive for activity change, appetite change, chills, fatigue and unexpected weight change  Negative for diaphoresis and fever  HENT: Negative for congestion, ear discharge, ear pain, facial swelling, hearing loss, mouth sores, postnasal drip, rhinorrhea, sneezing and sore throat  Eyes: Negative for photophobia, discharge, redness, itching and visual disturbance  Respiratory: Positive for cough and shortness of breath  Cardiovascular: Negative for chest pain, palpitations and leg swelling  Gastrointestinal: Positive for abdominal pain, constipation, nausea and vomiting  Negative for abdominal distention, blood in stool and diarrhea  Endocrine: Positive for polydipsia and polyuria  Negative for cold intolerance and heat intolerance  Genitourinary: Positive for flank pain and frequency  Negative for difficulty urinating, dysuria, hematuria, penile pain, penile swelling, scrotal swelling, testicular pain and urgency  Musculoskeletal: Positive for back pain  Negative for arthralgias, joint swelling, myalgias, neck pain and neck stiffness  Skin: Positive for rash  Negative for color change and wound  Allergic/Immunologic: Negative for immunocompromised state  Neurological: Negative for dizziness, tremors, seizures, syncope, speech difficulty, light-headedness, numbness and headaches  Hematological: Negative for adenopathy     Psychiatric/Behavioral: Negative for agitation, confusion, hallucinations and sleep disturbance  The patient is not nervous/anxious  Objective   Vitals: Blood pressure 153/70, pulse (!) 116, temperature (!) 97 2 °F (36 2 °C), temperature source Temporal, resp  rate (!) 38, height 5' 11" (1 803 m), weight 68 kg (150 lb), SpO2 100 %  Physical Exam   General- Awake and alert, ill appearing, generally weak  Not in acute respiratory distress  HEENT: PERRLA, EOMI, sclera anicteric, dry mucous membranes, tongue mucosa dry without lesions  Neck: supple, no JVD, lymphadenopathy, thyromegaly  Heart: Regular rhythm but with tachycardia, S1S2 present  No murmur, rub or gallop  Lungs; Clear to auscultation bilaterally  No wheezing, crackles or rhonchi  No accessory muscle use or respiratory distress  Abdomen: soft, non-tender, non-distended, NABS  No guarding or rebound  No peritoneal sound or mass  Mild RT CVA tenderness but no paraspinal tenderness  Extremities: no clubbing, cyanosis, or edema  2+ pedal pulses bilaterally  Full range of motion  Neurologic:  Cranial nerves II-XII intact  Strength and sensation globally intact  Speech fluent and goal directed  Awake, alert and oriented x 3  Skin: warm and dry   No petechiae or purpura but noted with occipital macular rash with erythematous base    Lab Results:     Results from last 7 days  Lab Units 11/07/18  0331 11/05/18  0854   WBC Thousand/uL 17 61*  --    WHITE BLOOD CELL COUNT  x10E3/uL  --  6 5   HEMOGLOBIN g/dL 17 6*  --    HEMOGLOBIN  g/dL  --  18 0*   HEMATOCRIT % 50 9*  --    HEMATOCRIT  %  --  49 8   PLATELETS Thousands/uL 386  --    PLATELETS  [id number]/NL  --  307       Results from last 7 days  Lab Units 11/07/18  0345 11/07/18  0331 11/05/18  0854   POTASSIUM mmol/L  --  4 1 4 0   CHLORIDE mmol/L  --  89* 95*   CO2 mmol/L  --  8* 7*   CO2, I-STAT mmol/L 5*  --   --    BUN mg/dL  --  23 11   CREATININE mg/dL  --  1 70*  --    CALCIUM mg/dL  --  11 0*  -- Imaging:  No orders to display       EKG, Pathology, and Other Studies:  Reviewed    Assessment/Plan     Assessment:  Nausea, vomiting and abdominal pain secondary to DKA  Anion gap metabolic acidosis from DKA  Pseudohyponatremia  Mild hypercalcemia secondary to volume depletion  Acute kidney injury from volume depletion  Dehydration  Leukocytosis likely stress induced, but monitoring closely to r/o occult infection  Dyspnea    Plan:  Pt will be admitted to the intensive care unit  He will be maintained on insulin gtt, will obtain serial venous PH and electrolyte monitoring  Given his severe acidosis with an initial pH of about 6 8 he is receiving a 2hr bolus of bicarbonate drip in the ED  Repeat venous pH is pending  He will be on aggressive IV fluid resuscitation  Keeping him NPO for now  Endocrinology consultation has been placed  No clinical evidence of infectious etiology triggering event but needs close monitoring to r/o occult infection  If his back pain persists may need a CT for evaluation  I have explained to him given his new diagnosis the need for DM education and compliance  He will be placed on IV zofran and analgesia for supportive management  Given his mild SOB likely due to his acidosis but cannot rule out aspiration pneumonia given his recurrent nausea and vomiting, I will therefore obtain a CXR    Code Status: No Order      Counseling / Coordination of Care  Total floor / unit time spent today 75 minutes  Greater than 50% of total time was spent with the patient and / or family counseling and / or coordination of care  Pt will require more than 2 midnight stay    Portions of the record may have been created with voice recognition software  Occasional wrong word or "sound a like" substitutions may have occurred due to the inherent limitations of voice recognition software  Read the chart carefully and recognize, using context, where substitutions have occurred

## 2018-11-07 NOTE — ED NOTES
Pt stating his pain is a 3/10  Requesting something for pain before pain spikes again  Dr Steve Oconnell Notified   Tylenol to be given     Viri Conklin RN  11/07/18 0890

## 2018-11-07 NOTE — ED NOTES
Patient states being extremely fatigued, losing 20 pounds this past month, being extremely thirsty, peeing a lot, having dry mouth, and having abdominal pain the past 2 weeks with on and off vomiting and constipation  Patient took an enema at home 2 days ago after getting blood work done from doctor        Samuel Narayanan, RN  11/07/18 1518

## 2018-11-07 NOTE — ED NOTES
Dr Ben Zapata at bedside     Chriskyaw Waller, [address] [place] [address]  11/07/18 8627
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
## 2018-11-07 NOTE — PROGRESS NOTES
Dr Krystle Mccracken made aware of most recent anion gap  Ordered to continue to keep insulin gtt infusing

## 2018-11-07 NOTE — ED NOTES
Pt guided with deep breathing exercises  Pt taking deep breaths  Pt responding appropriately  Attempting to obtain EKG,  Noted artifact  Pt reports feeling pain with inspiration          Wilmer Schultz RN  11/07/18 4011

## 2018-11-07 NOTE — ED PROVIDER NOTES
History  Chief Complaint   Patient presents with    Shortness of Breath     pt presents to ER stating his spine hurts really bad, its hard for him to breathe, has been vomiting and has abdominal pain     This is a previously healthy 19year-old male complains of back pain over the last hour  This woke up from sleep  On further questioning he has had shortness of breath, fatigue, vertigo, nausea heartburn and intermittent abdominal pain for 2 weeks  He has vomited several times recently  Has not kept anything down all day  He has been constipated for 1 week  His father notes that he has been tachypneic since yesterday and states he has had 20 pound weight loss in the last month  He was seen several days ago at [facility] and labs drawn at times show serum CO2 of 7, glucose 203, sodium 130, hemoglobin A1c 13 6 and mild hemoconcentration but otherwise normal result  He had an abdominal ultrasound series that the technician described as unremarkable  Patient denies fever cough rash diarrhea and dysuria  His back pain is paraspinal bilaterally and worse in the lumbar region  There has been no recent trauma, fever or invasive spinal manipulation            None       Past Medical History:   Diagnosis Date    Fracture of phalanx of toe     Resolved 2/1/2016        History reviewed  No pertinent surgical history  Family History   Problem Relation Age of Onset    Diabetes Family      I have reviewed and agree with the history as documented  Social History   Substance Use Topics    Smoking status: Never Smoker    Smokeless tobacco: Never Used    Alcohol use No        Review of Systems   Constitutional: Positive for activity change, fatigue and unexpected weight change  Negative for fever  HENT: Negative  Eyes: Negative  Respiratory: Positive for shortness of breath  Negative for cough  Cardiovascular: Negative      Gastrointestinal: Positive for abdominal pain, constipation, nausea and vomiting  Negative for blood in stool and diarrhea  Endocrine: Positive for polydipsia and polyuria  Genitourinary: Positive for frequency  Negative for dysuria and hematuria  Musculoskeletal: Positive for back pain and myalgias  Negative for neck pain and neck stiffness  Skin: Negative  Allergic/Immunologic: Negative  Neurological: Positive for dizziness and light-headedness  Negative for facial asymmetry and headaches  Hematological: Negative  Psychiatric/Behavioral: Negative  All other systems reviewed and are negative  Physical Exam  Physical Exam   Constitutional: He is oriented to person, place, and time  He appears well-developed and well-nourished  No distress  Very thin, with sunken eyes  Appears dehydrated   HENT:   Head: Normocephalic and atraumatic  Right Ear: External ear normal    Left Ear: External ear normal    Dry oral mucosa   Eyes: Pupils are equal, round, and reactive to light  Conjunctivae and EOM are normal    Neck: Normal range of motion  Neck supple  No JVD present  Cardiovascular: Regular rhythm, normal heart sounds and intact distal pulses  No murmur heard  Tachycardia   Pulmonary/Chest: Effort normal and breath sounds normal  No stridor  He exhibits no tenderness  Abdominal: Soft  Bowel sounds are normal  He exhibits no distension and no mass  There is tenderness (Generalized)  There is no rebound and no guarding  Musculoskeletal: Normal range of motion  He exhibits no edema or tenderness  Lymphadenopathy:     He has no cervical adenopathy  Neurological: He is alert and oriented to person, place, and time  He has normal reflexes  No cranial nerve deficit  Coordination normal    Skin: Skin is warm and dry  Capillary refill takes less than 2 seconds  No rash noted  He is not diaphoretic  There is pallor  Psychiatric: He has a normal mood and affect  His behavior is normal    Nursing note and vitals reviewed        Vital Signs  ED Triage Vitals   Temperature Pulse Respirations Blood Pressure SpO2   11/07/18 0311 11/07/18 0311 11/07/18 0311 11/07/18 0311 11/07/18 0311   (!) 97 2 °F (36 2 °C) (!) 135 18 133/96 99 %      Temp Source Heart Rate Source Patient Position - Orthostatic VS BP Location FiO2 (%)   11/07/18 0311 11/07/18 0311 -- -- --   Temporal Monitor         Pain Score       11/07/18 0335       8           Vitals:    11/07/18 0400 11/07/18 0430 11/07/18 0445 11/07/18 0500   BP: 137/63 150/65 147/65 155/71   Pulse: (!) 120 (!) 120 (!) 119 (!) 120       Visual Acuity      ED Medications  Medications   sodium chloride (PF) 0 9 % injection 3 mL (not administered)   insulin regular (HumuLIN R,NovoLIN R) 1 Units/mL in sodium chloride 0 9 % 100 mL infusion (6 Units/hr Intravenous New Bag 11/7/18 0453)   sodium bicarbonate 75 mEq in sodium chloride 0 45 % 1,000 mL infusion (not administered)   sodium chloride 0 9 % bolus 1,000 mL (0 mL Intravenous Stopped 11/7/18 0407)   ondansetron (ZOFRAN) injection 4 mg (4 mg Intravenous Given 11/7/18 0331)   morphine (PF) 4 mg/mL injection 4 mg (4 mg Intravenous Given 11/7/18 0335)   insulin regular (HumuLIN R,NovoLIN R) injection 6 Units (6 Units Intravenous Given 11/7/18 0442)   sodium chloride 0 9 % with KCl 20 mEq/L infusion (premix) (1,000 mL/hr Intravenous New Bag 11/7/18 0505)       Diagnostic Studies  Results Reviewed     Procedure Component Value Units Date/Time    Basic metabolic panel [[id number]]     Lab Status:  No result Specimen:  Blood     Basic metabolic panel [[id number]]     Lab Status:  No result Specimen:  Blood     Basic metabolic panel [[id number]]     Lab Status:  No result Specimen:  Blood     Blood culture #1 [[id number]] Collected:  11/07/18 0441    Lab Status:  No result Specimen:  Blood from Hand, Left     Blood culture #2 [[id number]] Collected:  11/07/18 0440    Lab Status:  No result Specimen:  Blood from Arm, Right     CBC and differential [[id number]]  (Abnormal) Collected:  11/07/18 3987    Lab Status:  Final result Specimen:  Blood from Arm, Left Updated:  11/07/18 0420     WBC 17 61 (H) Thousand/uL      RBC 5 86 (H) Million/uL      Hemoglobin 17 6 (H) g/dL      Hematocrit 50 9 (H) %      MCV 87 fL      MCH 30 0 pg      MCHC 34 6 g/dL      RDW 12 7 %      MPV 10 6 fL      Platelets 949 Thousands/uL      nRBC 0 /100 WBCs     Narrative: This is an appended report  These results have been appended to a previously verified report  Comprehensive metabolic panel [[id number]]  (Abnormal) Collected:  11/07/18 0331    Lab Status:  Final result Specimen:  Blood from Arm, Left Updated:  11/07/18 0415     Sodium 127 (L) mmol/L      Potassium 4 1 mmol/L      Chloride 89 (L) mmol/L      CO2 8 (LL) mmol/L      ANION GAP 30 (HH) mmol/L      BUN 23 mg/dL      Creatinine 1 70 (H) mg/dL      Glucose 457 (H) mg/dL      Calcium 11 0 (H) mg/dL      AST 12 U/L      ALT 30 U/L      Alkaline Phosphatase 110 U/L      Total Protein 8 2 g/dL      Albumin 5 0 g/dL      Total Bilirubin 0 70 mg/dL      eGFR 57 ml/min/1 73sq m     Narrative:         National Kidney Disease Education Program recommendations are as follows:  GFR calculation is accurate only with a steady state creatinine  Chronic Kidney disease less than 60 ml/min/1 73 sq  meters  Kidney failure less than 15 ml/min/1 73 sq  meters      Lipase [[id number]]  (Normal) Collected:  11/07/18 0331    Lab Status:  Final result Specimen:  Blood from Arm, Left Updated:  11/07/18 0412     Lipase 123 u/L     Magnesium [[id number]]  (Abnormal) Collected:  11/07/18 0331    Lab Status:  Final result Specimen:  Blood from Arm, Left Updated:  11/07/18 0412     Magnesium 2 8 (H) mg/dL     Acetone [[id number]]  (Abnormal) Collected:  11/07/18 0331    Lab Status:  Final result Specimen:  Blood from Arm, Left Updated:  11/07/18 0356     Acetone, Bld 1+ (A)    POCT Blood Gas (G3+) [[id number]]  (Abnormal) Collected:  11/07/18 0345    Lab Status:  Final result Updated:  11/07/18 0348     ph, Chau ISTAT 6 877 (LL)     pCO2, Chau i-STAT 24 0 (LL) mm HG      pO2, Chau i-STAT 38 0 mm HG      BE, i-STAT -28 (L) mmol/L      HCO3, Chau i-STAT 4 5 (LL) mmol/L      CO2, i-STAT 5 (LL) mmol/L      O2 Sat, i-STAT 40 (L) %      Specimen Type VENOUS    UA w Reflex to Microscopic [[id number]]     Lab Status:  No result Specimen:  Urine     Fingerstick Glucose (POCT) [[id number]]  (Abnormal) Collected:  11/07/18 0326    Lab Status:  Final result Updated:  11/07/18 0327     POC Glucose 432 (H) mg/dl                  No orders to display              Procedures  CriticalCare Time  Performed by: Marilynn Chirinos  Authorized by: Marilynn Chirinos     Critical care provider statement:     Critical care time (minutes):  60    Critical care time was exclusive of:  Separately billable procedures and treating other patients and teaching time    Critical care was necessary to treat or prevent imminent or life-threatening deterioration of the following conditions:  Endocrine crisis    Critical care was time spent personally by me on the following activities:  Obtaining history from patient or surrogate, development of treatment plan with patient or surrogate, evaluation of patient's response to treatment, examination of patient, ordering and review of laboratory studies and re-evaluation of patient's condition    I assumed direction of critical care for this patient from another provider in my specialty: no             Phone Contacts  ED Phone Contact    ED Course                               MDM  Number of Diagnoses or Management Options  DKA (diabetic ketoacidoses) [facility]): new and requires workup  Diagnosis management comments: New onset DKA  Patient dehydrated but with stable blood pressure  No sign of mental status change  No sign of infection  Will start insulin drip  Patient will receive sodium bicarbonate due to pH below 6 9      The patient presented with a condition in which there was a high probability of imminent or life-threatening deterioration, and critical care services (excluding separately billable procedures) totalled 30-74 minutes  Disposition  Final diagnoses:   None     ED Disposition     None      Follow-up Information    None         Patient's Medications   Discharge Prescriptions    No medications on file     No discharge procedures on file      ED Provider  Electronically Signed by           Christiano Us DO  11/07/18 6137

## 2018-11-07 NOTE — SOCIAL WORK
Met with Pt  Pt's mother at bedside  Discussed role of   Pt lives with parents in [place]  Pt is independent with adls/ambulation  Pt goes to [employer] in [place]  Will follow for discharge insulin needs

## 2018-11-07 NOTE — PROGRESS NOTES
Pt c/o of intermittent spinal pain that he rates as a 4/10  Pt states that the pain will "hit out of nowhere" and disappears  Dr Evita Ortiz made aware

## 2018-11-08 ENCOUNTER — APPOINTMENT (INPATIENT)
Dept: RADIOLOGY | Facility: HOSPITAL | Age: 18
DRG: 638 | End: 2018-11-08
Payer: COMMERCIAL

## 2018-11-08 PROBLEM — G89.29 CHRONIC BILATERAL THORACIC BACK PAIN: Status: ACTIVE | Noted: 2018-11-08

## 2018-11-08 PROBLEM — E10.9 TYPE 1 DIABETES MELLITUS WITHOUT COMPLICATION (HCC): Status: ACTIVE | Noted: 2018-11-08

## 2018-11-08 PROBLEM — E87.6 HYPOKALEMIA: Status: ACTIVE | Noted: 2018-11-08

## 2018-11-08 PROBLEM — M54.6 CHRONIC BILATERAL THORACIC BACK PAIN: Status: ACTIVE | Noted: 2018-11-08

## 2018-11-08 LAB
ANION GAP SERPL CALCULATED.3IONS-SCNC: 10 MMOL/L (ref 4–13)
ANION GAP SERPL CALCULATED.3IONS-SCNC: 11 MMOL/L (ref 4–13)
ANION GAP SERPL CALCULATED.3IONS-SCNC: 8 MMOL/L (ref 4–13)
ANION GAP SERPL CALCULATED.3IONS-SCNC: 8 MMOL/L (ref 4–13)
ARTERIAL PATENCY WRIST A: ABNORMAL
BASE EXCESS BLDA CALC-SCNC: -16 MMOL/L (ref -2–3)
BASE EXCESS BLDA CALC-SCNC: -18 MMOL/L (ref -2–3)
BASE EXCESS BLDA CALC-SCNC: -20 MMOL/L (ref -2–3)
BASE EXCESS BLDA CALC-SCNC: -23 MMOL/L (ref -2–3)
BASOPHILS # BLD AUTO: 0.02 THOUSANDS/ΜL (ref 0–0.1)
BASOPHILS NFR BLD AUTO: 0 % (ref 0–1)
BUN SERPL-MCNC: 7 MG/DL (ref 5–25)
BUN SERPL-MCNC: 8 MG/DL (ref 5–25)
BUN SERPL-MCNC: 9 MG/DL (ref 5–25)
BUN SERPL-MCNC: 9 MG/DL (ref 5–25)
CALCIUM SERPL-MCNC: 8.2 MG/DL (ref 8.3–10.1)
CALCIUM SERPL-MCNC: 8.3 MG/DL (ref 8.3–10.1)
CALCIUM SERPL-MCNC: 8.3 MG/DL (ref 8.3–10.1)
CALCIUM SERPL-MCNC: 8.6 MG/DL (ref 8.3–10.1)
CHLORIDE SERPL-SCNC: 103 MMOL/L (ref 100–108)
CHLORIDE SERPL-SCNC: 105 MMOL/L (ref 100–108)
CHLORIDE SERPL-SCNC: 106 MMOL/L (ref 100–108)
CHLORIDE SERPL-SCNC: 107 MMOL/L (ref 100–108)
CO2 SERPL-SCNC: 19 MMOL/L (ref 21–32)
CO2 SERPL-SCNC: 20 MMOL/L (ref 21–32)
CO2 SERPL-SCNC: 22 MMOL/L (ref 21–32)
CO2 SERPL-SCNC: 22 MMOL/L (ref 21–32)
CREAT SERPL-MCNC: 0.6 MG/DL (ref 0.6–1.3)
CREAT SERPL-MCNC: 0.61 MG/DL (ref 0.6–1.3)
CREAT SERPL-MCNC: 0.68 MG/DL (ref 0.6–1.3)
CREAT SERPL-MCNC: 0.79 MG/DL (ref 0.6–1.3)
EOSINOPHIL # BLD AUTO: 0.03 THOUSAND/ΜL (ref 0–0.61)
EOSINOPHIL NFR BLD AUTO: 1 % (ref 0–6)
ERYTHROCYTE [DISTWIDTH] IN BLOOD BY AUTOMATED COUNT: 12.5 % (ref 11.6–15.1)
GFR SERPL CREATININE-BSD FRML MDRD: 129 ML/MIN/1.73SQ M
GFR SERPL CREATININE-BSD FRML MDRD: 137 ML/MIN/1.73SQ M
GFR SERPL CREATININE-BSD FRML MDRD: 144 ML/MIN/1.73SQ M
GFR SERPL CREATININE-BSD FRML MDRD: 145 ML/MIN/1.73SQ M
GLUCOSE SERPL-MCNC: 105 MG/DL (ref 65–140)
GLUCOSE SERPL-MCNC: 113 MG/DL (ref 65–140)
GLUCOSE SERPL-MCNC: 154 MG/DL (ref 65–140)
GLUCOSE SERPL-MCNC: 158 MG/DL (ref 65–140)
GLUCOSE SERPL-MCNC: 181 MG/DL (ref 65–140)
GLUCOSE SERPL-MCNC: 182 MG/DL (ref 65–140)
GLUCOSE SERPL-MCNC: 194 MG/DL (ref 65–140)
GLUCOSE SERPL-MCNC: 195 MG/DL (ref 65–140)
GLUCOSE SERPL-MCNC: 205 MG/DL (ref 65–140)
GLUCOSE SERPL-MCNC: 210 MG/DL (ref 65–140)
GLUCOSE SERPL-MCNC: 217 MG/DL (ref 65–140)
GLUCOSE SERPL-MCNC: 223 MG/DL (ref 65–140)
GLUCOSE SERPL-MCNC: 243 MG/DL (ref 65–140)
GLUCOSE SERPL-MCNC: 275 MG/DL (ref 65–140)
GLUCOSE SERPL-MCNC: 282 MG/DL (ref 65–140)
GLUCOSE SERPL-MCNC: 318 MG/DL (ref 65–140)
GLUCOSE SERPL-MCNC: 91 MG/DL (ref 65–140)
HCO3 BLDA-SCNC: 11 MMOL/L (ref 24–30)
HCO3 BLDA-SCNC: 5.9 MMOL/L (ref 24–30)
HCO3 BLDA-SCNC: 7.7 MMOL/L (ref 24–30)
HCO3 BLDA-SCNC: 9.9 MMOL/L (ref 24–30)
HCT VFR BLD AUTO: 31.8 % (ref 36.5–49.3)
HGB BLD-MCNC: 11.5 G/DL (ref 12–17)
IMM GRANULOCYTES # BLD AUTO: 0.03 THOUSAND/UL (ref 0–0.2)
IMM GRANULOCYTES NFR BLD AUTO: 1 % (ref 0–2)
LYMPHOCYTES # BLD AUTO: 1.1 THOUSANDS/ΜL (ref 0.6–4.47)
LYMPHOCYTES NFR BLD AUTO: 25 % (ref 14–44)
MCH RBC QN AUTO: 30 PG (ref 26.8–34.3)
MCHC RBC AUTO-ENTMCNC: 36.2 G/DL (ref 31.4–37.4)
MCV RBC AUTO: 83 FL (ref 82–98)
MONOCYTES # BLD AUTO: 0.5 THOUSAND/ΜL (ref 0.17–1.22)
MONOCYTES NFR BLD AUTO: 11 % (ref 4–12)
NEUTROPHILS # BLD AUTO: 2.79 THOUSANDS/ΜL (ref 1.85–7.62)
NEUTS SEG NFR BLD AUTO: 62 % (ref 43–75)
NRBC BLD AUTO-RTO: 0 /100 WBCS
PCO2 BLD: 11 MMOL/L (ref 21–32)
PCO2 BLD: 12 MMOL/L (ref 21–32)
PCO2 BLD: 21.6 MM HG (ref 42–50)
PCO2 BLD: 23.6 MM HG (ref 42–50)
PCO2 BLD: 29.4 MM HG (ref 42–50)
PCO2 BLD: 30.4 MM HG (ref 42–50)
PCO2 BLD: 7 MMOL/L (ref 21–32)
PCO2 BLD: 8 MMOL/L (ref 21–32)
PH BLD: 7.04 [PH] (ref 7.3–7.4)
PH BLD: 7.12 [PH] (ref 7.3–7.4)
PH BLD: 7.13 [PH] (ref 7.3–7.4)
PH BLD: 7.17 [PH] (ref 7.3–7.4)
PLATELET # BLD AUTO: 151 THOUSANDS/UL (ref 149–390)
PMV BLD AUTO: 9.6 FL (ref 8.9–12.7)
PO2 BLD: 110 MM HG (ref 35–45)
PO2 BLD: 168 MM HG (ref 35–45)
PO2 BLD: 40 MM HG (ref 35–45)
PO2 BLD: 55 MM HG (ref 35–45)
POTASSIUM SERPL-SCNC: 2.9 MMOL/L (ref 3.5–5.3)
POTASSIUM SERPL-SCNC: 3 MMOL/L (ref 3.5–5.3)
POTASSIUM SERPL-SCNC: 3.2 MMOL/L (ref 3.5–5.3)
POTASSIUM SERPL-SCNC: 3.3 MMOL/L (ref 3.5–5.3)
RBC # BLD AUTO: 3.83 MILLION/UL (ref 3.88–5.62)
SAMPLE SITE: ABNORMAL
SAO2 % BLD FROM PO2: 60 % (ref 95–98)
SAO2 % BLD FROM PO2: 78 % (ref 95–98)
SAO2 % BLD FROM PO2: 96 % (ref 95–98)
SAO2 % BLD FROM PO2: 99 % (ref 95–98)
SODIUM SERPL-SCNC: 133 MMOL/L (ref 136–145)
SODIUM SERPL-SCNC: 135 MMOL/L (ref 136–145)
SODIUM SERPL-SCNC: 136 MMOL/L (ref 136–145)
SODIUM SERPL-SCNC: 137 MMOL/L (ref 136–145)
SPECIMEN SOURCE: ABNORMAL
WBC # BLD AUTO: 4.47 THOUSAND/UL (ref 4.31–10.16)

## 2018-11-08 PROCEDURE — 72072 X-RAY EXAM THORAC SPINE 3VWS: CPT

## 2018-11-08 PROCEDURE — 99232 SBSQ HOSP IP/OBS MODERATE 35: CPT | Performed by: INTERNAL MEDICINE

## 2018-11-08 PROCEDURE — 82948 REAGENT STRIP/BLOOD GLUCOSE: CPT

## 2018-11-08 PROCEDURE — 80048 BASIC METABOLIC PNL TOTAL CA: CPT | Performed by: INTERNAL MEDICINE

## 2018-11-08 PROCEDURE — 85025 COMPLETE CBC W/AUTO DIFF WBC: CPT | Performed by: NURSE PRACTITIONER

## 2018-11-08 PROCEDURE — 99233 SBSQ HOSP IP/OBS HIGH 50: CPT | Performed by: INTERNAL MEDICINE

## 2018-11-08 RX ORDER — POTASSIUM CHLORIDE 20 MEQ/1
40 TABLET, EXTENDED RELEASE ORAL
Status: DISCONTINUED | OUTPATIENT
Start: 2018-11-08 | End: 2018-11-10 | Stop reason: HOSPADM

## 2018-11-08 RX ORDER — INSULIN GLARGINE 100 [IU]/ML
20 INJECTION, SOLUTION SUBCUTANEOUS
Status: DISCONTINUED | OUTPATIENT
Start: 2018-11-08 | End: 2018-11-10 | Stop reason: HOSPADM

## 2018-11-08 RX ORDER — INSULIN GLARGINE 100 [IU]/ML
15 INJECTION, SOLUTION SUBCUTANEOUS
Status: DISCONTINUED | OUTPATIENT
Start: 2018-11-08 | End: 2018-11-08

## 2018-11-08 RX ORDER — POTASSIUM CHLORIDE 20 MEQ/1
40 TABLET, EXTENDED RELEASE ORAL 2 TIMES DAILY
Status: DISCONTINUED | OUTPATIENT
Start: 2018-11-08 | End: 2018-11-08

## 2018-11-08 RX ORDER — INSULIN GLARGINE 100 [IU]/ML
15 INJECTION, SOLUTION SUBCUTANEOUS ONCE
Status: COMPLETED | OUTPATIENT
Start: 2018-11-08 | End: 2018-11-08

## 2018-11-08 RX ORDER — POTASSIUM CHLORIDE 20 MEQ/1
40 TABLET, EXTENDED RELEASE ORAL ONCE
Status: COMPLETED | OUTPATIENT
Start: 2018-11-08 | End: 2018-11-08

## 2018-11-08 RX ADMIN — FAMOTIDINE 20 MG: 10 INJECTION, SOLUTION INTRAVENOUS at 08:07

## 2018-11-08 RX ADMIN — DEXTROSE, SODIUM CHLORIDE, AND POTASSIUM CHLORIDE 250 ML/HR: 5; .9; .15 INJECTION INTRAVENOUS at 07:51

## 2018-11-08 RX ADMIN — INSULIN LISPRO 3 UNITS: 100 INJECTION, SOLUTION INTRAVENOUS; SUBCUTANEOUS at 11:58

## 2018-11-08 RX ADMIN — POTASSIUM CHLORIDE 40 MEQ: 1500 TABLET, EXTENDED RELEASE ORAL at 16:46

## 2018-11-08 RX ADMIN — INSULIN GLARGINE 15 UNITS: 100 INJECTION, SOLUTION SUBCUTANEOUS at 10:47

## 2018-11-08 RX ADMIN — INSULIN LISPRO 2 UNITS: 100 INJECTION, SOLUTION INTRAVENOUS; SUBCUTANEOUS at 16:47

## 2018-11-08 RX ADMIN — INSULIN GLARGINE 20 UNITS: 100 INJECTION, SOLUTION SUBCUTANEOUS at 21:42

## 2018-11-08 RX ADMIN — DEXTROSE, SODIUM CHLORIDE, AND POTASSIUM CHLORIDE 250 ML/HR: 5; .9; .15 INJECTION INTRAVENOUS at 03:29

## 2018-11-08 RX ADMIN — POTASSIUM CHLORIDE 40 MEQ: 1500 TABLET, EXTENDED RELEASE ORAL at 05:31

## 2018-11-08 RX ADMIN — INSULIN LISPRO 2 UNITS: 100 INJECTION, SOLUTION INTRAVENOUS; SUBCUTANEOUS at 21:42

## 2018-11-08 RX ADMIN — INSULIN LISPRO 2 UNITS: 100 INJECTION, SOLUTION INTRAVENOUS; SUBCUTANEOUS at 12:46

## 2018-11-08 RX ADMIN — POTASSIUM CHLORIDE 40 MEQ: 1500 TABLET, EXTENDED RELEASE ORAL at 10:46
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
## 2018-11-08 NOTE — SOCIAL WORK
Continue to follow  Late Entry from 11/7/18  Pt's mother agreeable for [facility] for diabetic teaching, insulin teaching for Pt upon discharge  Freedom of Choice given  Pt's mother agreeable for [facility]  Referral sent to [address] [place] [address] for diabetic teaching, insulin teaching  Will follow

## 2018-11-08 NOTE — PROGRESS NOTES
Gave Pt and Mom printed information for the patient teaching portal:  How to draw up insulin, How to give insulin, and DKA

## 2018-11-08 NOTE — ASSESSMENT & PLAN NOTE
Potassium is 3 0 this morning    Replace with potassium chloride 40 mg p o  B i d   Will recheck potassium at noon
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
## 2018-11-08 NOTE — ASSESSMENT & PLAN NOTE
Lab Results   Component Value Date    HGBA1C 13 6 (H) 11/05/2018       Recent Labs      11/08/18   0553  11/08/18   0706  11/08/18   0803  11/08/18   0904   POCGLU  194*  205*  205*  217*       Blood Sugar Average: Last 72 hrs:  (P) 194 5     As above will switch to Lantus and pre meal Humalog

## 2018-11-08 NOTE — PROGRESS NOTES
Progress Note - Burgess Diallo 19 y o  male MRN: [id number]    Unit/Bed#: -01 Encounter: [id number]      CC: diabetes f/u    Subjective:   Burgess Diallo is a 19y o  year old male with type 1 diabetes  Feels well  No complaints  No hypoglycemia  His mother is at the bedside with him and wants to learn about diabetes and its treatment also  Objective:     Vitals: Blood pressure 114/62, pulse 81, temperature 97 5 °F (36 4 °C), resp  rate 13, height 5' 11" (1 803 m), weight 66 3 kg (146 lb 2 6 oz), SpO2 97 %  ,Body mass index is 20 39 kg/m²  Intake/Output Summary (Last 24 hours) at 11/08/18 1222  Last data filed at 11/08/18 0751   Gross per 24 hour   Intake          5997 07 ml   Output             2455 ml   Net          3542 07 ml       Physical Exam:  General Appearance: awake, appears stated age and cooperative  Head: Normocephalic, without obvious abnormality, atraumatic  Extremities: moves all extremities  Skin: Skin color and temperature normal    Pulm: no labored breathing    Lab:    POC Glucose (mg/dl)   Date Value   11/08/2018 282 (H)   11/08/2018 210 (H)   11/08/2018 205 (H)   11/08/2018 217 (H)   11/08/2018 205 (H)   11/08/2018 205 (H)   11/08/2018 194 (H)   11/08/2018 195 (H)   11/08/2018 158 (H)   11/08/2018 181 (H)       Assessment:  diabetes with hyperglycemia  New onset type 1 diabetes  He has started insulin therapy subcutaneously today and will be discontinue his IV insulin later  He is starting to learn about diabetes and insulin administration  He is tolerating his meals well  Plan:  1  Continue Lantus insulin 15 units daily  2  Continue Humalog insulin 3 units with each meal   3  I have ordered a Humalog insulin sliding scale before meals and at bedtime so we can further adjust insulin as needed  4  Continue q i d  blood sugar testing to adjust insulin  5  Continue to work on diabetic education and administration of insulin and testing of blood sugars    5  Will order dietitian consult for medical nutrition education  Portions of the record may have been created with voice recognition software
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
## 2018-11-08 NOTE — PROGRESS NOTES
Pt AAOx4 resting in bed  Mom at bedside  Pt denies pain or back pain at this time, SOB, nausea or vomiting  Insulin gtt infusing per order  IVF infusing per order  BMP drawn  VSS  Call bell within reach  Pt able to make needs known  See flowsheet for assessment

## 2018-11-08 NOTE — PROGRESS NOTES
Pt taught about lantus insulin and how to properly administer  Pt verbalized understanding  I demonstrated this administration to pt  I will have the pt administer the next insulin dose himself

## 2018-11-08 NOTE — UTILIZATION REVIEW
Initial Clinical Review  Admission: Date/Time/Statement: 11/7/18 @ 0518   Orders Placed This Encounter   Procedures    Inpatient Admission (expected length of stay for this patient is greater than two midnights)     Standing Status:   Standing     Number of Occurrences:   1     Order Specific Question:   Admitting Physician     Answer:   Amisha Sykes [[id number]]     Order Specific Question:   Level of Care     Answer:   Critical Care [15]     Order Specific Question:   Estimated length of stay     Answer:   More than 2 Midnights     Order Specific Question:   Certification     Answer:   I certify that inpatient services are medically necessary for this patient for a duration of greater than two midnights  See H&P and MD Progress Notes for additional information about the patient's course of treatment  ED: Date/Time/Mode of Arrival:   ED Arrival Information     Expected Arrival Acuity Means of Arrival Escorted By Service Admission Type    - 11/7/2018 03:05 Urgent Walk-In Self General Medicine Urgent    Arrival Complaint    FLANK PAIN      Chief Complaint:   Chief Complaint   Patient presents with    Shortness of Breath     pt presents to ER stating his spine hurts really bad, its hard for him to breathe, has been vomiting and has abdominal pain   History of Illness:    49-year-old male complains of back pain over the last hour  This woke up from sleep  On further questioning he has had shortness of breath, fatigue, vertigo, nausea heartburn and intermittent abdominal pain for 2 weeks  He has vomited several times recently  Has not kept anything down all day  He has been constipated for 1 week  His father notes that he has been tachypneic since yesterday and states he has had 20 pound weight loss in the last month  He was seen several days ago at [facility] and labs drawn at times show serum CO2 of 7, glucose 203, sodium 130, hemoglobin A1c 13 6 and mild hemoconcentration but otherwise normal result    He had an abdominal ultrasound series that the technician described as unremarkable  His back pain is paraspinal bilaterally and worse in the lumbar region      ED Vital Signs:   ED Triage Vitals   Temperature Pulse Respirations Blood Pressure SpO2   11/07/18 0311 11/07/18 0311 11/07/18 0311 11/07/18 0311 11/07/18 0311   (!) 97 2 °F (36 2 °C) (!) 135 18 133/96 99 %      Temp Source Heart Rate Source Patient Position - Orthostatic VS BP Location FiO2 (%)   11/07/18 0311 11/07/18 0311 11/07/18 1118 11/07/18 1118 --   Temporal Monitor Lying Right arm       Pain Score       11/07/18 0335       8        Wt Readings from Last 1 Encounters:   11/08/18 66 3 kg (146 lb 2 6 oz)   Vital Signs (abnormal):   , 100  RR 23, 25  Abnormal Labs/Diagnostic Test Results:   BANDS 10 WBC 17 61 HGB 17 6  K 2 8 CL 89 CO2 8 ANION GAP 30 CR 1 70 GLUC 457 CA 11 0 MG 2 8 ACETONE 1+ PHOS 1 3   ph, Chau ISTAT 7 300 - 7 400 6 877   LL    pCO2, Chau i-STAT 42 0 - 50 0 mm HG 24 0   LL    pO2, Chau i-STAT 35 0 - 45 0 mm HG 38 0     BE, i-STAT -2 - 3 mmol/L -28   L    HCO3, Chau i-STAT 24 0 - 30 0 mmol/L 4 5   LL    CO2, i-STAT 21 - 32 mmol/L 5   LL    O2 Sat, i-STAT 95 - 98 % 40   L    U/A+LEUK, PROT, GLUC, KETONES, BLOOD  CXR=No acute cardiopulmonary disease  ED Treatment:   Medication Administration from 11/07/2018 0305 to 11/07/2018 3778       Date/Time Order Dose Route Action Action by Comments     11/07/2018 0407 sodium chloride 0 9 % bolus 1,000 mL 0 mL Intravenous Ashley Haynes RN      11/07/2018 0331 sodium chloride 0 9 % bolus 1,000 mL 1,000 mL Intravenous Jd 37 Liang Norton RN      11/07/2018 0331 ondansetron (ZOFRAN) injection 4 mg 4 mg Intravenous Given Liang Norton RN      11/07/2018 0335 morphine (PF) 4 mg/mL injection 4 mg 4 mg Intravenous Given Liang Norton RN      11/07/2018 0442 insulin regular (HumuLIN R,NovoLIN R) injection 6 Units 6 Units Intravenous Given Liang Norton RN 11/07/2018 0614 insulin regular (HumuLIN R,NovoLIN R) 1 Units/mL in sodium chloride 0 9 % 100 mL infusion 3 Units/hr Intravenous Continue to Inpatient Floor Rossana Morin RN      11/07/2018 0453 insulin regular (HumuLIN R,NovoLIN R) 1 Units/mL in sodium chloride 0 9 % 100 mL infusion 6 Units/hr Intravenous New Bag Yumiko Escobar RN med mixed in ED, no barcode available  11/07/2018 0614 sodium bicarbonate 75 mEq in sodium chloride 0 45 % 1,000 mL infusion 500 mL/hr Intravenous Continue to Inpatient Floor Rossana Morin RN      11/07/2018 0533 sodium bicarbonate 75 mEq in sodium chloride 0 45 % 1,000 mL infusion 500 mL/hr Intravenous New Bag Yumiko Escobar RN mixed in ED, no barcode available     11/07/2018 0614 sodium chloride 0 9 % with KCl 20 mEq/L infusion (premix) 0 mL/hr Intravenous Stopped Rossana Morin RN      11/07/2018 0505 sodium chloride 0 9 % with KCl 20 mEq/L infusion (premix) 1,000 mL/hr Intravenous [address] Yumiko Escobar RN      11/07/2018 2936 acetaminophen (TYLENOL) tablet 975 mg 975 mg Oral Given Rossana Morin RN       Past Medical/Surgical History: Active Ambulatory Problems     Diagnosis Date Noted    No Active Ambulatory Problems     Resolved Ambulatory Problems     Diagnosis Date Noted    No Resolved Ambulatory Problems     Past Medical History:   Diagnosis Date    Fracture of phalanx of toe    Admitting Diagnosis: Shortness of breath [R06 02]  DKA [facility]diabetic k[facility]acidoses) ([facility] ) [E13 10]  Age/Sex: 19 y o  male  Assessment/Plan:   20 YO MALE TO ER FROM HOME C/O N/V,  BACK/L FLANK & ABD PAIN, POLYDIPSIA & POLYURIA, SOB, COUGH & REPORTS 20# WEIGHT LOSS DUE TO POOR APPETITE  ADMISSION WORK-UP FOUND ELEVATED BLOOD SUGAR & WBC'S WITH BANDEMIA, HYPONATREMIC, HYPOKALEMIC & +ACETONE  ADMITTED TO INPATIENT STATUS & STARTED ON IV INSULIN GTT     Admission Orders:  ICU  CONSULT ENDOCRINOLOGY  NPO  CONSULT NUTRITION  ACCUCHECKS PER IV INSULIN GTT PROTOCOL  Scheduled Meds:   Current Facility-Administered Medications:  acetaminophen 650 mg Oral Q6H PRN Yina Ahumada MD    bisacodyl 10 mg Rectal Daily PRN RADHA Streeter    magnesium hydroxide 30 mL Oral Daily PRN Chya Contreras MD    ondansetron 4 mg Intravenous Q4H PRN Yina Ahumada MD    potassium chloride 40 mEq Oral BID Lida Ren MD    sodium chloride (PF) 3 mL Intravenous PRN Neishacaleb Yarbrough,     Continuous Infusions:  IVF @ 250CC/HR   insulin regular (HumuLIN R,NovoLIN R) infusion 6 Units/hr Last Rate: 1 5 Units/hr (11/08/18 0805)   PRN Meds:   acetaminophen    bisacodyl    magnesium hydroxide    ondansetron  [address]  Utilization Review Department  Phone: [phone]; Fax [phone]  [email or web address]  ATTENTION: Please call with any questions or concerns to [phone]  and carefully listen to the prompts so that you are directed to the right person  Send all requests for admission clinical reviews, approved or denied determinations and any other requests to fax [phone]   All voicemails are confidential

## 2018-11-09 PROBLEM — D72.829 LEUKOCYTOSIS: Status: RESOLVED | Noted: 2018-11-07 | Resolved: 2018-11-09

## 2018-11-09 LAB
ANION GAP SERPL CALCULATED.3IONS-SCNC: 8 MMOL/L (ref 4–13)
BUN SERPL-MCNC: 9 MG/DL (ref 5–25)
CALCIUM SERPL-MCNC: 8.8 MG/DL (ref 8.3–10.1)
CHLORIDE SERPL-SCNC: 103 MMOL/L (ref 100–108)
CO2 SERPL-SCNC: 28 MMOL/L (ref 21–32)
CREAT SERPL-MCNC: 0.55 MG/DL (ref 0.6–1.3)
GFR SERPL CREATININE-BSD FRML MDRD: 150 ML/MIN/1.73SQ M
GLUCOSE SERPL-MCNC: 233 MG/DL (ref 65–140)
GLUCOSE SERPL-MCNC: 263 MG/DL (ref 65–140)
GLUCOSE SERPL-MCNC: 267 MG/DL (ref 65–140)
GLUCOSE SERPL-MCNC: 89 MG/DL (ref 65–140)
GLUCOSE SERPL-MCNC: 92 MG/DL (ref 65–140)
POTASSIUM SERPL-SCNC: 2.4 MMOL/L (ref 3.5–5.3)
POTASSIUM SERPL-SCNC: 3.3 MMOL/L (ref 3.5–5.3)
SODIUM SERPL-SCNC: 139 MMOL/L (ref 136–145)

## 2018-11-09 PROCEDURE — 84132 ASSAY OF SERUM POTASSIUM: CPT | Performed by: INTERNAL MEDICINE

## 2018-11-09 PROCEDURE — 80048 BASIC METABOLIC PNL TOTAL CA: CPT | Performed by: INTERNAL MEDICINE

## 2018-11-09 PROCEDURE — 82948 REAGENT STRIP/BLOOD GLUCOSE: CPT

## 2018-11-09 PROCEDURE — 99232 SBSQ HOSP IP/OBS MODERATE 35: CPT | Performed by: INTERNAL MEDICINE

## 2018-11-09 RX ORDER — POTASSIUM CHLORIDE 20 MEQ/1
40 TABLET, EXTENDED RELEASE ORAL
Status: DISPENSED | OUTPATIENT
Start: 2018-11-09 | End: 2018-11-09

## 2018-11-09 RX ADMIN — INSULIN LISPRO 2 UNITS: 100 INJECTION, SOLUTION INTRAVENOUS; SUBCUTANEOUS at 21:45

## 2018-11-09 RX ADMIN — ACETAMINOPHEN 650 MG: 325 TABLET, FILM COATED ORAL at 07:38

## 2018-11-09 RX ADMIN — INSULIN LISPRO 2 UNITS: 100 INJECTION, SOLUTION INTRAVENOUS; SUBCUTANEOUS at 11:56

## 2018-11-09 RX ADMIN — INSULIN LISPRO 2 UNITS: 100 INJECTION, SOLUTION INTRAVENOUS; SUBCUTANEOUS at 17:07

## 2018-11-09 RX ADMIN — INSULIN GLARGINE 20 UNITS: 100 INJECTION, SOLUTION SUBCUTANEOUS at 21:45

## 2018-11-09 RX ADMIN — POTASSIUM CHLORIDE 40 MEQ: 1500 TABLET, EXTENDED RELEASE ORAL at 11:55

## 2018-11-09 RX ADMIN — POTASSIUM CHLORIDE 40 MEQ: 1500 TABLET, EXTENDED RELEASE ORAL at 14:53

## 2018-11-09 RX ADMIN — POTASSIUM CHLORIDE 40 MEQ: 1500 TABLET, EXTENDED RELEASE ORAL at 07:35

## 2018-11-09 NOTE — SOCIAL WORK
Continue to follow  Spoke with Pat at [employer] in [place]  Faxed Pt's insulin scripts(Humalog pens and Lantus pens), script for blood glucose machine with strips and lancets to Pt's pharmacy to [employer] at [phone]  Call received from Amber Marsh at [employer] in [place], Pt's copay cost for lantus pens, humalog pens and Free style lite blood glucose machine strips and lancets will be $225  Pat informed  that the total cost is for 75 day supply  Pat informed  that Pt can print out coupons online for [employer] and [employer]  Pat informed  that the pens and blood glucose machine are ready for Pt to   Met with Pt and Pt's mother  Pt's mother informed of total copay cost of $225 for lantus pens, humalog pens for 75 day supply and blood glucose machine with strips and lancets  Pt's mother in agreement  Pt's mother informed that she can print out humalog pen and lantus pen coupons online to bring to pharmacy  Pt's mother informed that [facility] can accept Pt for home nurse visits for insulin and diabetic teaching  Pt's mother in agreement  [facility] informed Pt has a tentative discharge for 11/10/18

## 2018-11-09 NOTE — PROGRESS NOTES
Diabetic education provided to pt and taught to pt regarding diabetes, hypo and hyperglycemia symptoms, and dietary guidelines  Pt verbalized understanding and packet provided to pt

## 2018-11-09 NOTE — PROGRESS NOTES
Continuing diabetic education  Pt taught proper administration with the pens that he will be using at home  Pt verbalized understanding and returned demonstration of proper set up and administration

## 2018-11-09 NOTE — SOCIAL WORK
Continue to follow  [facility] informed  that will be out to see Pt on Lucio morning, 11/11/18  Pt informed that [facility] will be out Sunday morning and they will call him to setup a schedule  Pt in agreement

## 2018-11-09 NOTE — PROGRESS NOTES
Progress Note - Kenn Stubbs 19 y o  male MRN: [id number]    Unit/Bed#: -01 Encounter: [id number]      Assessment:    Principal Problem:    DKA (diabetic ketoacidoses) ([facility] )  Active Problems:    Type 1 diabetes mellitus without complication ([facility] )    Chronic bilateral thoracic back pain    Hypokalemia  Resolved Problems:    Leukocytosis      Plan:  Correct serum potassium  Patient's hypoglycemic this morning thus will, will keep the same dose of HS Lantus however patient did have an extra 15 units of Lantus yesterday morning  Will get case management to get insulin orders faxed to his pharmacy  Discharge either later today or more than likely tomorrow  Regarding his back pain he probably should have outpatient physical therapy  Subjective:   Seems to be doing fine  Objective:     Vitals: Blood pressure 141/66, pulse 78, temperature 98 7 °F (37 1 °C), resp  rate 14, height 5' 11" (1 803 m), weight 66 3 kg (146 lb 2 6 oz), SpO2 99 %  ,Body mass index is 20 39 kg/m²  Intake/Output Summary (Last 24 hours) at 11/09/18 0759  Last data filed at 11/08/18 2001   Gross per 24 hour   Intake           2249 8 ml   Output             1300 ml   Net            949 8 ml       Physical Exam:    Alert and awake in no acute distress  Lungs clear to auscultation bilaterally  Heart regular rate and rythm, normal heart sounds  Abdomen soft, active bowel sounds, non-tender, non-distended  Extremities: no cyanosis, clubbing or edema        Invasive Devices     Peripheral Intravenous Line            Peripheral IV 11/07/18 Left Antecubital 2 days    Peripheral IV 11/07/18 Right Antecubital 2 days                            Lab, Imaging and other studies: I have personally reviewed pertinent reports  Results from last 7 days  Lab Units 11/08/18  0411 11/07/18  0331 11/05/18  0854   WBC Thousand/uL 4 47 17 61*  --    WHITE BLOOD CELL COUNT  x10E3/uL  --   --  6 5   HEMOGLOBIN g/dL 11 5* 17 6*  --    HEMOGLOBIN  g/dL  --   --  18 0*   HEMATOCRIT % 31 8* 50 9*  --    HEMATOCRIT  %  --   --  49 8   PLATELETS Thousands/uL 151 386  --    PLATELETS  P34D6/NI  --   --  307   NEUTROS PCT % 62  --   --    NEUTROS PCT  %  --   --  66   LYMPHS PCT % 25  --   --    LYMPHO PCT %  --  13*  --    LYMPHS PCT  %  --   --  25   MONOS PCT % 11  --   --    MONO PCT %  --  7  --    MONOS PCT  %  --   --  8   EOS PCT % 1 0  --    EOS PCT  %  --   --  0       Results from last 7 days  Lab Units 11/09/18  0449 11/08/18  1256 11/08/18  0807  11/07/18  0331   POTASSIUM mmol/L 2 4* 3 3* 3 0*  < > 4 1   CHLORIDE mmol/L 103 103 105  < > 89*   CO2 mmol/L 28 22 20*  < > 8*   CO2, I-STAT   --   --   --   < >  --    BUN mg/dL 9 8 7  < > 23   CREATININE mg/dL 0 55* 0 60 0 61  < > 1 70*   CALCIUM mg/dL 8 8 8 2* 8 3  < > 11 0*   ALK PHOS U/L  --   --   --   --  110   ALT U/L  --   --   --   --  30   AST U/L  --   --   --   --  12   < > = values in this interval not displayed  No results found for: TROPONINI, CKMB, CKTOTAL      Lab Results   Component Value Date    BLOODCX No Growth at 24 hrs  11/07/2018    BLOODCX No Growth at 24 hrs  11/07/2018       Imaging:  No results found for this or any previous visit  No results found for this or any previous visit  PATIENT CENTERED ROUNDS: I have performed rounds with the nursing staff  This note has been constructed using a voice recognition system      Zac Rodríguez MD

## 2018-11-09 NOTE — PROGRESS NOTES
Progress Note - Christin Jamil 19 y o  male MRN: [id number]    Unit/Bed#: -01 Encounter: [id number]      CC: diabetes f/u    Subjective:   Christin Jamil is a 19y o  year old male with type 1 diabetes  Feels well  No complaints  No hypoglycemia  Blood sugar did decrease to the 80s to 90s this morning when he got a 2nd dose of Lantus insulin last night  Objective:     Vitals: Blood pressure 141/66, pulse 78, temperature 98 7 °F (37 1 °C), resp  rate 14, height 5' 11" (1 803 m), weight 66 3 kg (146 lb 2 6 oz), SpO2 99 %  ,Body mass index is 20 39 kg/m²  Intake/Output Summary (Last 24 hours) at 11/09/18 1220  Last data filed at 11/08/18 2001   Gross per 24 hour   Intake            982 3 ml   Output              700 ml   Net            282 3 ml       Physical Exam:  General Appearance: awake, appears stated age and cooperative  Head: Normocephalic, without obvious abnormality, atraumatic  Extremities: moves all extremities  Skin: Skin color and temperature normal    Pulm: no labored breathing    Lab:    POC Glucose (mg/dl)   Date Value   11/09/2018 267 (H)   11/09/2018 92   11/08/2018 243 (H)   11/08/2018 275 (H)   11/08/2018 282 (H)   11/08/2018 210 (H)   11/08/2018 205 (H)   11/08/2018 217 (H)   11/08/2018 205 (H)   11/08/2018 205 (H)       Assessment:  diabetes with hyperglycemia  New onset type 1 diabetes  He feels comfortable with blood sugar testing and insulin administration  I discussed with he and his mother the possibility of using a Freestyle Hany as an outpatient to test his blood sugars  If his blood sugars are reasonable in the upper 100s to low 200s, then it is reasonable to discharge him with follow-up within a week or 2  Plan:  1  I agree with an increase in Lantus insulin to 20 units once a day  His Lantus insulin dose based on his weight is between 18 and 20 units daily    2  I will increase his Humalog insulin to 5 units with each meal as he appears to increase his blood sugars after meals  3  Continue to check blood sugars 4 times a day to adjust insulin as needed  Portions of the record may have been created with voice recognition software

## 2018-11-10 VITALS
SYSTOLIC BLOOD PRESSURE: 116 MMHG | RESPIRATION RATE: 17 BRPM | HEART RATE: 65 BPM | HEIGHT: 71 IN | DIASTOLIC BLOOD PRESSURE: 60 MMHG | BODY MASS INDEX: 20.46 KG/M2 | TEMPERATURE: 98 F | WEIGHT: 146.16 LBS | OXYGEN SATURATION: 98 %

## 2018-11-10 PROBLEM — M54.6 CHRONIC BILATERAL THORACIC BACK PAIN: Status: RESOLVED | Noted: 2018-11-08 | Resolved: 2018-11-10

## 2018-11-10 PROBLEM — G89.29 CHRONIC BILATERAL THORACIC BACK PAIN: Status: RESOLVED | Noted: 2018-11-08 | Resolved: 2018-11-10

## 2018-11-10 PROBLEM — E11.10 DKA (DIABETIC KETOACIDOSES): Status: RESOLVED | Noted: 2018-11-07 | Resolved: 2018-11-10

## 2018-11-10 LAB
ANION GAP SERPL CALCULATED.3IONS-SCNC: 7 MMOL/L (ref 4–13)
BUN SERPL-MCNC: 14 MG/DL (ref 5–25)
CALCIUM SERPL-MCNC: 9.4 MG/DL (ref 8.3–10.1)
CHLORIDE SERPL-SCNC: 104 MMOL/L (ref 100–108)
CO2 SERPL-SCNC: 28 MMOL/L (ref 21–32)
CREAT SERPL-MCNC: 0.54 MG/DL (ref 0.6–1.3)
GFR SERPL CREATININE-BSD FRML MDRD: 151 ML/MIN/1.73SQ M
GLUCOSE SERPL-MCNC: 126 MG/DL (ref 65–140)
GLUCOSE SERPL-MCNC: 142 MG/DL (ref 65–140)
GLUCOSE SERPL-MCNC: 158 MG/DL (ref 65–140)
POTASSIUM SERPL-SCNC: 3.2 MMOL/L (ref 3.5–5.3)
SODIUM SERPL-SCNC: 139 MMOL/L (ref 136–145)

## 2018-11-10 PROCEDURE — 80048 BASIC METABOLIC PNL TOTAL CA: CPT | Performed by: INTERNAL MEDICINE

## 2018-11-10 PROCEDURE — 82948 REAGENT STRIP/BLOOD GLUCOSE: CPT

## 2018-11-10 PROCEDURE — 99239 HOSP IP/OBS DSCHRG MGMT >30: CPT | Performed by: INTERNAL MEDICINE

## 2018-11-10 RX ORDER — INSULIN GLARGINE 100 [IU]/ML
20 INJECTION, SOLUTION SUBCUTANEOUS
Qty: 10 ML | Refills: 0 | Status: CANCELLED | OUTPATIENT
Start: 2018-11-10

## 2018-11-10 RX ORDER — POTASSIUM CHLORIDE 20 MEQ/1
20 TABLET, EXTENDED RELEASE ORAL 2 TIMES DAILY
Qty: 6 TABLET | Refills: 0 | Status: SHIPPED | OUTPATIENT
Start: 2018-11-10 | End: 2018-11-21

## 2018-11-10 RX ADMIN — ACETAMINOPHEN 650 MG: 325 TABLET, FILM COATED ORAL at 07:19

## 2018-11-10 RX ADMIN — POTASSIUM CHLORIDE 40 MEQ: 1500 TABLET, EXTENDED RELEASE ORAL at 07:19

## 2018-11-10 RX ADMIN — POTASSIUM CHLORIDE 40 MEQ: 1500 TABLET, EXTENDED RELEASE ORAL at 13:09

## 2018-11-10 RX ADMIN — POTASSIUM CHLORIDE 40 MEQ: 1500 TABLET, EXTENDED RELEASE ORAL at 18:27

## 2018-11-10 RX ADMIN — INSULIN LISPRO 1 UNITS: 100 INJECTION, SOLUTION INTRAVENOUS; SUBCUTANEOUS at 12:42

## 2018-11-10 RX ADMIN — INSULIN LISPRO 1 UNITS: 100 INJECTION, SOLUTION INTRAVENOUS; SUBCUTANEOUS at 17:15

## 2018-11-10 NOTE — DISCHARGE SUMMARY
Discharge- Marcelle Reid 2000, 19 y o  male MRN: [id number]    Unit/Bed#: -02 Encounter: [id number]    Primary Care Provider: Marika Caba DO   Date and time admitted to hospital: 11/7/2018  3:10 AM        * DKA (diabetic ketoacidoses) [facility])   Assessment & Plan    Lab Results   Component Value Date    HGBA1C 13 6 (H) 11/05/2018       Recent Labs      11/09/18   1618  11/09/18   2106  11/10/18   0615  11/10/18   1234   POCGLU  233*  263*  142*  158*       Blood Sugar Average: Last 72 hrs:  (P) 417 [id number]     Patient admitted with diabetic ketoacidosis  This is new diagnosis for patient that has been slowly developing in conspicuously for several months  Patient admitted with DKA  He was started on IV insulin, and IV fluids  Endocrinology was following the patient throughout the hospital stay  Type 1 diabetes mellitus without complication [facility])   Assessment & Plan    Lab Results   Component Value Date    HGBA1C 13 6 (H) 11/05/2018       Recent Labs      11/09/18   1618  11/09/18   2106  11/10/18   0615  11/10/18   1234   POCGLU  233*  263*  142*  158*       Blood Sugar Average: Last 72 hrs:  (P) 972 [id number]     Patient was transitioned from IV insulin to Lantus and pre meal Humalog  He will take Lantus 20 units at bedtime and Humalog 5 units before meals  We gave patient teaching on how to check his blood sugars and had to administer insulin to himself id [facility] is request to see the patient at home and help with further diabetes teaching  I made sure patient is comfortable checking his blood sugars and administering insulin this afternoon before discharging him  Case was discussed with patient's mother in detail at the bedside  He will follow up with me as well as Endocrinology     Hypokalemia   Assessment & Plan    Patient has hypokalemia which is replace with potassium chlorid and patient annelise have repeat BMP done on Tuesday                   Hospital Course: Henri Reid is a 19 y o  male patient who originally presented to the hospital on   Admission Orders     Ordered        11/07/18 0518  Inpatient Admission (expected length of stay for this patient is greater than two midnights)  Once            due to DKA    Please see above list of diagnoses and related plan for additional information  Condition at Discharge:  good      Discharge instructions/Information to patient and family:   See after visit summary for information provided to patient and family  Provisions for Follow-Up Care:  See after visit summary for information related to follow-up care and any pertinent home health orders  Disposition:     Home with [facility] Services (Reminder: Complete face to face encounter)       Discharge Statement:  I spent 35 minutes discharging the patient  This time was spent on the day of discharge  I had direct contact with the patient on the day of discharge  Greater than 50% of the total time was spent examining patient, answering all patient questions, arranging and discussing plan of care with patient as well as directly providing post-discharge instructions  Additional time then spent on discharge activities  Discharge Medications:  See after visit summary for reconciled discharge medications provided to patient and family        ** Please Note: This note has been constructed using a voice recognition system **

## 2018-11-10 NOTE — ASSESSMENT & PLAN NOTE
Lab Results   Component Value Date    HGBA1C 13 6 (H) 11/05/2018       Recent Labs      11/09/18   1051  11/09/18   1618  11/09/18   2106  11/10/18   0615   POCGLU  267*  233*  263*  142*       Blood Sugar Average: Last 72 hrs:  (P) 196 [id number]     Will continue Lantus and pre meal Humalog  Patient is not quite comfortable getting an insulin yet  Will teach him how to inject insulin today  He also does not know how to check his blood sugars  Will provide blood sugar check teaching to him  I discussed the case with patient's mother at the bedside in detail  If patient is not able to check blood sugars will continue teaching today and not discharge him today otherwise he will be discharged later

## 2018-11-10 NOTE — ASSESSMENT & PLAN NOTE
Lab Results   Component Value Date    HGBA1C 13 6 (H) 11/05/2018       Recent Labs      11/09/18   1618  11/09/18   2106  11/10/18   0615  11/10/18   1234   POCGLU  233*  263*  142*  158*       Blood Sugar Average: Last 72 hrs:  (P) 838 [id number]     Patient admitted with diabetic ketoacidosis  This is new diagnosis for patient that has been slowly developing in conspicuously for several months  Patient admitted with DKA  He was started on IV insulin, and IV fluids  Endocrinology was following the patient throughout the hospital stay

## 2018-11-10 NOTE — ASSESSMENT & PLAN NOTE
Lab Results   Component Value Date    HGBA1C 13 6 (H) 11/05/2018       Recent Labs      11/09/18   1618  11/09/18   2106  11/10/18   0615  11/10/18   1234   POCGLU  233*  263*  142*  158*       Blood Sugar Average: Last 72 hrs:  (P) 894 [id number]     Patient was transitioned from IV insulin to Lantus and pre meal Humalog  He will take Lantus 20 units at bedtime and Humalog 5 units before meals  We gave patient teaching on how to check his blood sugars and had to administer insulin to himself id [facility] is request to see the patient at home and help with further diabetes teaching  I made sure patient is comfortable checking his blood sugars and administering insulin this afternoon before discharging him  Case was discussed with patient's mother in detail at the bedside      He will follow up with me as well as Endocrinology

## 2018-11-10 NOTE — PROGRESS NOTES
Asked patient's mother to go to [employer] and obtain the glucometer so the patient can practice taking his blood sugar on the machine he will be using at home  Plus I could assist patient and mother with how to use insulin pens    Patient and patient's mother, and Dr Pepe King in agreement with plan

## 2018-11-10 NOTE — ASSESSMENT & PLAN NOTE
Lab Results   Component Value Date    HGBA1C 13 6 (H) 11/05/2018       Recent Labs      11/09/18   1051  11/09/18   1618  11/09/18   2106  11/10/18   0615   POCGLU  267*  233*  263*  142*       Blood Sugar Average: Last 72 hrs:  (P) 025 [id number]     DKA was present on admission  Resolved with IV insulin hydration

## 2018-11-10 NOTE — PROGRESS NOTES
Provided patient and his mother written education regarding new diagnosis of Diabetes Type I, Hyper/hypglycemia signs/symptoms and how to intervene  Gave written education regarding meal planning, carbohydrate counting and plate management  Will obtain a food diary sheet for patient to journalize meals and insulin coverage to help patient along his new diagnosis  All of this information reviewed with Dr Pepe King

## 2018-11-10 NOTE — ASSESSMENT & PLAN NOTE
Patient has hypokalemia which is replace with potassium chlorid and patient annelise have repeat BMP done on Tuesday

## 2018-11-10 NOTE — PROGRESS NOTES
Patient's dinner is here  Per Dr Shelley Murdock patient took his blood sugar via his new glucometer and result =190  Patient then used his new Humalog insulin pen to give himself the sliding scale dose of 1unit into his abdomen  per his inpatient orders  Patient will practice Humalog pen after he eats dinner to give himself the ordered 5units with meals

## 2018-11-10 NOTE — PROGRESS NOTES
Patient educated on using his glucometer and insulin pen with patient's mother present  Answered all questions prosed  Patient has now taken his blood sugar twice with his own glucometer as practice with minimal difficulty  Will continue to reinforce education

## 2018-11-10 NOTE — PROGRESS NOTES
Patient was able to use his Humalog pen with no difficulty at all this time  Patient was able to dial 5units on his pen, and administer insulin into his abdominal tissue  Patient expressed confidence in doing this at home    Will notify Dr Litzy Chan

## 2018-11-10 NOTE — PROGRESS NOTES
Progress Note - Chris Clarity 2000, 19 y o  male MRN: [id number]    Unit/Bed#: -01 Encounter: [id number]    Primary Care Provider: Shahab Hussein DO   Date and time admitted to hospital: 11/7/2018  3:10 AM        * DKA (diabetic ketoacidoses) [facility])   Assessment & Plan    Lab Results   Component Value Date    HGBA1C 13 6 (H) 11/05/2018       Recent Labs      11/09/18   1051  11/09/18   1618  11/09/18   2106  11/10/18   0615   POCGLU  267*  233*  263*  142*       Blood Sugar Average: Last 72 hrs:  (P) 955 [id number]     DKA was present on admission  Resolved with IV insulin hydration  Type 1 diabetes mellitus without complication [facility])   Assessment & Plan    Lab Results   Component Value Date    HGBA1C 13 6 (H) 11/05/2018       Recent Labs      11/09/18   1051  11/09/18   1618  11/09/18   2106  11/10/18   0615   POCGLU  267*  233*  263*  142*       Blood Sugar Average: Last 72 hrs:  (P) 084 [id number]     Will continue Lantus and pre meal Humalog  Patient is not quite comfortable getting an insulin yet  Will teach him how to inject insulin today  He also does not know how to check his blood sugars  Will provide blood sugar check teaching to him  I discussed the case with patient's mother at the bedside in detail  If patient is not able to check blood sugars will continue teaching today and not discharge him today otherwise he will be discharged later  Hypokalemia   Assessment & Plan    Potassium is 3 2 this morning  Replace with potassium chloride 40 mg p o  t i d  VTE Prophylaxis: in place    Patient Centered Rounds: I rounded with patient's nurse    Current Length of Stay: 3 day(s)    Current Patient Status: Inpatient    Certification Statement: Pt requires additional inpatient hospital stay due to: see assessment and plan        Subjective:   Patient feels uncomfortable giving himself insulin and does not know how to check his blood sugars    Denies chest pain, shortness of breath, abdominal pain, dysuria    All other ROS are negative    Objective:     Vitals:   Temp (24hrs), Av 1 °F (36 7 °C), Min:97 3 °F (36 3 °C), Max:98 4 °F (36 9 °C)    Temp:  [97 3 °F (36 3 °C)-98 4 °F (36 9 °C)] 98 3 °F (36 8 °C)  HR:  [67-75] 67  Resp:  [16-18] 16  BP: (107-125)/(59-68) 112/59  SpO2:  [99 %-100 %] 99 %  Body mass index is 20 39 kg/m²  Input and Output Summary (last 24 hours): Intake/Output Summary (Last 24 hours) at 11/10/18 1001  Last data filed at 11/10/18 0901   Gross per 24 hour   Intake              480 ml   Output                0 ml   Net              480 ml       Physical Exam:     Physical Exam   Constitutional: He is oriented to person, place, and time  He appears well-developed  No distress  HENT:   Head: Normocephalic  Mouth/Throat: Oropharynx is clear and moist    Eyes: Conjunctivae are normal    Neck: Neck supple  Cardiovascular: Normal rate and regular rhythm  Pulmonary/Chest: Effort normal  No respiratory distress  He has no wheezes  He has no rales  Abdominal: Soft  Bowel sounds are normal  He exhibits no distension  There is no tenderness  Musculoskeletal: He exhibits no tenderness  Lymphadenopathy:     He has no cervical adenopathy  Neurological: He is alert and oriented to person, place, and time  No cranial nerve deficit  Skin: Skin is warm and dry  No rash noted  Psychiatric: He has a normal mood and affect  Vitals reviewed  I personally reviewed labs and imaging reports for today        Last 24 Hours Medication List:     Current Facility-Administered Medications:  acetaminophen 650 mg Oral Q6H PRN Yina Zepeda MD   bisacodyl 10 mg Rectal Daily PRN Forrestine RADHA Terrazas   insulin glargine 20 Units Subcutaneous HS Enrike Degroot MD   insulin lispro 1-5 Units Subcutaneous TID AC Claudine Sheets MD   insulin lispro 1-5 Units Subcutaneous HS Claudine Sheets MD   insulin lispro 5 Units Subcutaneous TID With Meals Anthony Smith MD   magnesium hydroxide 30 mL Oral Daily PRN Clare Nice MD   ondansetron 4 mg Intravenous Q4H PRN Yina Le MD   potassium chloride 40 mEq Oral TID With Meals Gato Cuenca MD   sodium chloride (PF) 3 mL Intravenous PRN Ulysses Reiter, DO          Today, Patient Was Seen By: Gato Cuneca MD    ** Please Note: Dictation voice to text software may have been used in the creation of this document   **

## 2018-11-10 NOTE — DISCHARGE INSTRUCTIONS
Sliding scale for pre meal Humalog insulin based on blood sugars before meals, this is in addition to 5 units of Humalog insulin before meals:    Blood Glucose 150 - 224: 1 Unit of Insulin  Blood Glucose 225 - 299: 2 Units of Insulin  Blood Glucose 300 - 374: 3 Units of Insulin  Blood Glucose 375 - 449: 4 Units of Insulin  Blood Glucose greater than or equal to 450: 5 Units of Insulin

## 2018-11-12 ENCOUNTER — TRANSITIONAL CARE MANAGEMENT (OUTPATIENT)
Dept: FAMILY MEDICINE CLINIC | Facility: CLINIC | Age: 18
End: 2018-11-12

## 2018-11-12 LAB
BACTERIA BLD CULT: NORMAL
BACTERIA BLD CULT: NORMAL

## 2018-11-13 ENCOUNTER — LAB REQUISITION (OUTPATIENT)
Dept: LAB | Facility: HOSPITAL | Age: 18
End: 2018-11-13
Payer: COMMERCIAL

## 2018-11-13 DIAGNOSIS — E87.6 HYPOKALEMIA: ICD-10-CM

## 2018-11-13 LAB
ANION GAP SERPL CALCULATED.3IONS-SCNC: 4 MMOL/L (ref 4–13)
BUN SERPL-MCNC: 23 MG/DL (ref 5–25)
CALCIUM SERPL-MCNC: 9 MG/DL (ref 8.3–10.1)
CHLORIDE SERPL-SCNC: 101 MMOL/L (ref 100–108)
CO2 SERPL-SCNC: 33 MMOL/L (ref 21–32)
CREAT SERPL-MCNC: 0.73 MG/DL (ref 0.6–1.3)
GFR SERPL CREATININE-BSD FRML MDRD: 134 ML/MIN/1.73SQ M
GLUCOSE SERPL-MCNC: 169 MG/DL (ref 65–140)
POTASSIUM SERPL-SCNC: 4.4 MMOL/L (ref 3.5–5.3)
SODIUM SERPL-SCNC: 138 MMOL/L (ref 136–145)

## 2018-11-13 PROCEDURE — 80048 BASIC METABOLIC PNL TOTAL CA: CPT | Performed by: FAMILY MEDICINE

## 2018-11-19 ENCOUNTER — OFFICE VISIT (OUTPATIENT)
Dept: ENDOCRINOLOGY | Facility: HOSPITAL | Age: 18
End: 2018-11-19
Payer: COMMERCIAL

## 2018-11-19 VITALS
HEIGHT: 71 IN | DIASTOLIC BLOOD PRESSURE: 70 MMHG | BODY MASS INDEX: 22.04 KG/M2 | WEIGHT: 157.4 LBS | HEART RATE: 82 BPM | SYSTOLIC BLOOD PRESSURE: 110 MMHG

## 2018-11-19 DIAGNOSIS — E10.9 TYPE 1 DIABETES MELLITUS WITHOUT COMPLICATION (HCC): Primary | ICD-10-CM

## 2018-11-19 PROCEDURE — 99214 OFFICE O/P EST MOD 30 MIN: CPT | Performed by: INTERNAL MEDICINE

## 2018-11-19 RX ORDER — FLASH GLUCOSE SENSOR
1 KIT MISCELLANEOUS 4 TIMES DAILY
Qty: 1 DEVICE | Refills: 0 | Status: SHIPPED | OUTPATIENT
Start: 2018-11-19 | End: 2020-08-13 | Stop reason: ALTCHOICE

## 2018-11-19 RX ORDER — FLASH GLUCOSE SENSOR
KIT MISCELLANEOUS
Qty: 3 EACH | Refills: 12 | Status: SHIPPED | OUTPATIENT
Start: 2018-11-19 | End: 2019-09-14 | Stop reason: SDUPTHER

## 2018-11-19 NOTE — LETTER
November 19, 2018     Paulino Osman DO  [address]  [address] [facility]    Patient: Cuate Duque   YOB: 2000   Date of Visit: 11/19/2018       Dear Dr Uriel Tijerina: Thank you for referring Shweta Mae to me for evaluation  Below are my notes for this consultation  If you have questions, please do not hesitate to call me  I look forward to following your patient along with you  Sincerely,        Lily Shelby MD        CC: No Recipients  Lily Shelby MD  11/19/2018  5:54 PM  Sign at close encounter  11/19/2018    Assessment/Plan      Diagnoses and all orders for this visit:    Type 1 diabetes mellitus without complication ([facility] )  -     HEMOGLOBIN A1C W/ EAG ESTIMATION Lab Collect; Future  -     Basic metabolic panel Lab Collect; Future  -     Ambulatory referral to medical nutrition therapy for diabetes; Future  -     Ambulatory referral to Diabetic Education; Future  -     Continuous Blood Gluc  (FREESTYLE LAUREN READER) SHAYNA; 1 each by Does not apply route 4 (four) times a day  -     Continuous Blood Gluc Sensor ([address] [place] [address]) MISC; Check blood sugars 4-7 times a day        Assessment/Plan:  Type 1 diabetes  He is a new onset type 1 diabetes within the last month  He is tolerating giving his insulin and doing a good job with his sliding scale  He is checking his blood sugars multiple times a day  To make it easier for him and so he could check his blood sugars before and 2 hours after meals, I have ordered the [facility] [place] device  I had a discussion with him and his mother about the fact that he needs to carbohydrate count an order for us to give him an insulin to carbohydrate ratio for his Humalog insulin at meals  He is also eating very large amounts of protein at certain meals such as 8 eggs at a time with 4 pieces of presley and a cup of cottage cheese or 3-4 hamburger patties without the buns     To that end, I have sent him to medical nutrition therapy here at [facility]   We also discussed the possibility of insulin pump therapy which he and his mother are interested in  I have also ordered diabetic education for pre pump training to get started on that as soon as possible  In the meantime, I will not adjust his insulin dosages until we get an insulin to carbohydrate ratio  I have asked him to follow up in 1 month with preceding hemoglobin A1c and BMP  CC: New Onset Type 1 Diabetes Hospital follow up    History of Present Illness     HPI: Nimisha Chaves is a 19y o  year old male with type 1 diabetes for new onset years  He was admitted to the hospital in early November in diabetic ketoacidosis and was found to have type 1 diabetes of new onset  He had lost 30 lb within 1 month prior to that hospitalization  Since being on insulin, he has more hunger and has gained 17 lb back within the last week  He is on insulin at home and takes lantus insulin 20 units daily at hs and Humalog insulin 5 units with each meal with sliding scale  He denies any polyuria, polydipsia, nocturia and blurry vision  His increase in hunger has occurred with the addition of insulin  His blurry vision is gone that he had prior to admission  He denies numbness or tingling of the feet  He denies chest pain or shortness of breath  He still has some fatigue  He denies neuropathy, nephropathy, retinopathy, heart attack, stroke and claudication but does admit to none  Hypoglycemic episodes: No never  Last A1C was in the hospital on admission and was  Lab Results   Component Value Date    HGBA1C 13 6 (H) 11/05/2018     Blood Sugar/Glucometer/Pump/CGM review: checks blood sugars 4 times a day  Freestyle lite test strips  Blood sugars range from 120s to 260  There is no particular pattern  He is eating large amounts of protein and up to 8 eggs at a time along with presley and cottage cheese and fruit    Other times, he will eat for hamburgers without the buns  Before breakfast:   Before lunch:   Before dinner:   Bedtime:     Review of Systems   Constitutional: Positive for fatigue and unexpected weight change  Had lost 30 lbs in 1 month and gained 17 lbs back in 1 week  Fatigue improving  Eyes: Negative for visual disturbance  Had blurry vision on admission and now normalized  Respiratory: Negative for chest tightness and shortness of breath  Cardiovascular: Negative for chest pain and palpitations  Gastrointestinal: Negative for abdominal pain, constipation, diarrhea and nausea  Endocrine: Negative for polydipsia, polyphagia and polyuria  Nocturia none  Some increase in hunger  Musculoskeletal: Negative for arthralgias and back pain  Skin: Negative for wound  Neurological: Negative for numbness  Psychiatric/Behavioral: Negative for sleep disturbance  Historical Information   Past Medical History:   Diagnosis Date    Fracture of phalanx of toe     Resolved 2/1/2016      No past surgical history on file    Social History   History   Alcohol Use No     History   Drug Use No     History   Smoking Status    Never Smoker   Smokeless Tobacco    Never Used     Family History:   Family History   Problem Relation Age of Onset    Diabetes Family     No Known Problems Mother     No Known Problems Father     No Known Problems Sister     No Known Problems Brother     No Known Problems Brother     No Known Problems Brother     No Known Problems Sister        Meds/Allergies   Current Outpatient Prescriptions   Medication Sig Dispense Refill    insulin glargine (LANTUS SOLOSTAR) 100 units/mL injection pen Inject 20 Units under the skin daily 5 pen 0    insulin lispro (HUMALOG KWIKPEN) 100 units/mL injection pen Inject 5 Units under the skin 3 (three) times a day with meals 5 pen 0    Continuous Blood Gluc  (FREESTYLE LAUREN READER) SHAYNA 1 each by Does not apply route 4 (four) times a day 1 Device 0    Continuous Blood Gluc Sensor (FREESTYLE LAUREN SENSOR SYSTEM) [facility] Check blood sugars 4-7 times a day 3 each 12    potassium chloride (K-DUR,KLOR-CON) 20 mEq tablet Take 1 tablet (20 mEq total) by mouth 2 (two) times a day for 3 days 6 tablet 0     No current facility-administered medications for this visit  No Known Allergies    Objective   Vitals: Blood pressure 110/70, pulse 82, height 5' 11" (1 803 m), weight 71 4 kg (157 lb 6 4 oz)  Invasive Devices          No matching active lines, drains, or airways          Physical Exam   Constitutional: He is oriented to person, place, and time  He appears well-developed and well-nourished  HENT:   Head: Normocephalic and atraumatic  Eyes: Pupils are equal, round, and reactive to light  Conjunctivae and EOM are normal    Neck: Normal range of motion  Neck supple  No thyromegaly present  No carotid bruits  Cardiovascular: Normal rate, regular rhythm and normal heart sounds  No murmur heard  Pulmonary/Chest: Breath sounds normal  He has no wheezes  Musculoskeletal: Normal range of motion  He exhibits no edema or deformity  Lymphadenopathy:     He has no cervical adenopathy  Neurological: He is alert and oriented to person, place, and time  Skin: Skin is warm and dry  No rash noted  No erythema  Vitals reviewed  The history was obtained from the review of the chart and from the patient      Lab Results:    Most recent Alc is  Lab Results   Component Value Date    HGBA1C 13 6 (H) 11/05/2018             Lab Results   Component Value Date    TSH 1 170 11/05/2018             Future Appointments  Date Time Provider Hugo Eastman   11/21/2018 10:45 AM DO KELVIN Lowery [facility]   11/21/2018 1:00 PM Parag Mary RD DIAB ED QTR Med Spc

## 2018-11-19 NOTE — PATIENT INSTRUCTIONS
Blood sugars are better  Be careful of the large portions of proteins like eggs and hamburgers  Let's get you to the diabetic educators to learn to carbohydrate count so we can adjust your insulin better  You can also learn eventually about the insulin pumps also and get started on this if you want  Let's get the chinyere Hardy for now  so you can check blood sugars before and 2 hours after meals  Follow up in 1 months with blood work

## 2018-11-19 NOTE — PROGRESS NOTES
11/19/2018    Assessment/Plan      Diagnoses and all orders for this visit:    [facility]  -     HEMOGLOBIN A1C W/ EAG ESTIMATION Lab Collect; Future  -     Basic metabolic panel Lab Collect; Future  -     Ambulatory referral to medical nutrition therapy for diabetes; Future  -     Ambulatory referral to Diabetic Education; Future  -     Continuous Blood Gluc  (FREESTYLE LAUREN READER) SHAYNA; 1 each by Does not apply route 4 (four) times a day  -     Continuous Blood Gluc Sensor ([address] [place] [address]) MISC; Check blood sugars 4-7 times a day        Assessment/Plan:  Type 1 diabetes  He is a new onset type 1 diabetes within the last month  He is tolerating giving his insulin and doing a good job with his sliding scale  He is checking his blood sugars multiple times a day  To make it easier for him and so he could check his blood sugars before and 2 hours after meals, I have [facility] the [facility] [employer] device  I had a discussion with him and his mother about the fact that he needs to carbohydrate count an order for us to give him an insulin to carbohydrate ratio for his Humalog insulin at meals  He is also eating very large amounts of protein at certain meals such as 8 eggs at a time with 4 pieces of presley and a cup of cottage cheese or 3-4 hamburger patties without the buns  To that end, I have sent him to medical nutrition therapy here at [facility]   We also discussed the possibility of insulin pump therapy which he and his mother are interested in  I have also ordered diabetic education for pre pump training to get started on that as soon as possible  In the meantime, I will not adjust his insulin dosages until we get an insulin to carbohydrate ratio  I have asked him to follow up in 1 month with preceding hemoglobin A1c and BMP        CC: New Onset Type 1 Diabetes Hospital follow up    History of Present Illness     HPI: Henri Reid is a 19y o  year old male with type 1 diabetes for new onset years  He was admitted to the hospital in early November in diabetic ketoacidosis and was found to have type 1 diabetes of new onset  He had lost 30 lb within 1 month prior to that hospitalization  Since being on insulin, he has more hunger and has gained 17 lb back within the last week  He is on insulin at home and takes lantus insulin 20 units daily at hs and Humalog insulin 5 units with each meal with sliding scale  He denies any polyuria, polydipsia, nocturia and blurry vision  His increase in hunger has occurred with the addition of insulin  His blurry vision is gone that he had prior to admission  He denies numbness or tingling of the feet  He denies chest pain or shortness of breath  He still has some fatigue  He denies neuropathy, nephropathy, retinopathy, heart attack, stroke and claudication but does admit to none  Hypoglycemic episodes: No never  Last A1C was in the hospital on admission and was  Lab Results   Component Value Date    HGBA1C 13 6 (H) 11/05/2018     Blood Sugar/Glucometer/Pump/CGM review: checks blood sugars 4 times a day  Freestyle lite test strips  Blood sugars range from 120s to 260  There is no particular pattern  He is eating large amounts of protein and up to 8 eggs at a time along with presley and cottage cheese and fruit  Other times, he will eat for hamburgers without the buns  Before breakfast:   Before lunch:   Before dinner:   Bedtime:     Review of Systems   Constitutional: Positive for fatigue and unexpected weight change  Had lost 30 lbs in 1 month and gained 17 lbs back in 1 week  Fatigue improving  Eyes: Negative for visual disturbance  Had blurry vision on admission and now normalized  Respiratory: Negative for chest tightness and shortness of breath  Cardiovascular: Negative for chest pain and palpitations     Gastrointestinal: Negative for abdominal pain, constipation, diarrhea and nausea  Endocrine: Negative for polydipsia, polyphagia and polyuria  Nocturia none  Some increase in hunger  Musculoskeletal: Negative for arthralgias and back pain  Skin: Negative for wound  Neurological: Negative for numbness  Psychiatric/Behavioral: Negative for sleep disturbance  Historical Information   Past Medical History:   Diagnosis Date    Fracture of phalanx of toe     Resolved 2/1/2016      No past surgical history on file  Social History   History   Alcohol Use No     History   Drug Use No     History   Smoking Status    Never Smoker   Smokeless Tobacco    Never Used     Family History:   Family History   Problem Relation Age of Onset    Diabetes Family     No Known Problems Mother     No Known Problems Father     No Known Problems Sister     No Known Problems Brother     No Known Problems Brother     No Known Problems Brother     No Known Problems Sister        Meds/Allergies   Current Outpatient Prescriptions   Medication Sig Dispense Refill    insulin glargine (LANTUS SOLOSTAR) 100 units/mL injection pen Inject 20 Units under the skin daily 5 pen 0    insulin lispro (HUMALOG KWIKPEN) 100 units/mL injection pen Inject 5 Units under the skin 3 (three) times a day with meals 5 pen 0    Continuous Blood Gluc  (FREESTYLE LAUREN READER) SHAYNA 1 each by Does not apply route 4 (four) times a day 1 Device 0    Continuous Blood Gluc Sensor (FREESTYLE LAUREN SENSOR SYSTEM) MISC Check blood sugars 4-7 times a day 3 each 12    potassium chloride (K-DUR,KLOR-CON) 20 mEq tablet Take 1 tablet (20 mEq total) by mouth 2 (two) times a day for 3 days 6 tablet 0     No current facility-administered medications for this visit  No Known Allergies    Objective   Vitals: Blood pressure 110/70, pulse 82, height 5' 11" (1 803 m), weight 71 4 kg (157 lb 6 4 oz)    Invasive Devices          No matching active lines, drains, or airways          Physical Exam   Constitutional: He is oriented to person, place, and time  He appears well-developed and well-nourished  HENT:   Head: Normocephalic and atraumatic  Eyes: Pupils are equal, round, and reactive to light  Conjunctivae and EOM are normal    Neck: Normal range of motion  Neck supple  No thyromegaly present  No carotid bruits  Cardiovascular: Normal rate, regular rhythm and normal heart sounds  No murmur heard  Pulmonary/Chest: Breath sounds normal  He has no wheezes  Musculoskeletal: Normal range of motion  He exhibits no edema or deformity  Lymphadenopathy:     He has no cervical adenopathy  Neurological: He is alert and oriented to person, place, and time  Skin: Skin is warm and dry  No rash noted  No erythema  Vitals reviewed  The history was obtained from the review of the chart and from the patient      Lab Results:    Most recent Alc is  Lab Results   Component Value Date    HGBA1C 13 6 (H) 11/05/2018             Lab Results   Component Value Date    TSH 1 170 11/05/2018             Future Appointments  Date Time Provider Hugo Eastman   11/21/2018 10:45 AM DO KELVIN Watts [facility]   11/21/2018 1:00 PM Joseph Malik RD DIAB ED QTR Med Spc

## 2018-11-21 ENCOUNTER — OFFICE VISIT (OUTPATIENT)
Dept: DIABETES SERVICES | Facility: HOSPITAL | Age: 18
End: 2018-11-21
Payer: COMMERCIAL

## 2018-11-21 ENCOUNTER — TELEPHONE (OUTPATIENT)
Dept: ENDOCRINOLOGY | Facility: HOSPITAL | Age: 18
End: 2018-11-21

## 2018-11-21 ENCOUNTER — OFFICE VISIT (OUTPATIENT)
Dept: FAMILY MEDICINE CLINIC | Facility: CLINIC | Age: 18
End: 2018-11-21
Payer: COMMERCIAL

## 2018-11-21 VITALS — WEIGHT: 159.8 LBS | BODY MASS INDEX: 22.29 KG/M2

## 2018-11-21 VITALS
WEIGHT: 180.2 LBS | SYSTOLIC BLOOD PRESSURE: 100 MMHG | BODY MASS INDEX: 25.23 KG/M2 | TEMPERATURE: 97.6 F | DIASTOLIC BLOOD PRESSURE: 70 MMHG | HEIGHT: 71 IN | RESPIRATION RATE: 14 BRPM | HEART RATE: 93 BPM | OXYGEN SATURATION: 98 %

## 2018-11-21 DIAGNOSIS — E10.9 TYPE 1 DIABETES MELLITUS WITHOUT COMPLICATION (HCC): Primary | ICD-10-CM

## 2018-11-21 PROCEDURE — 99213 OFFICE O/P EST LOW 20 MIN: CPT | Performed by: FAMILY MEDICINE

## 2018-11-21 PROCEDURE — 3008F BODY MASS INDEX DOCD: CPT | Performed by: FAMILY MEDICINE

## 2018-11-21 PROCEDURE — 97802 MEDICAL NUTRITION INDIV IN: CPT | Performed by: DIETITIAN, REGISTERED

## 2018-11-21 NOTE — PROGRESS NOTES
Medical Nutrition Therapy        Assessment    Visit Type: Initial visit    Chief complaint T1DM    HPI: Malissa Fernandez is newly diagnosed with Type 1 Diabetes and has been on insulin regimen Lantus 20 units daily and Humalog 5 units before meals for the past 11 days  Blood Sugar log shows preprandial BG range 121 - 227 mg/dL and 162 - 267 mg/dL before bed  Diet history reveals inadequate carbohydrate intake, excessive protein intake, high saturated fat intake, and inadequate vegetable intake  Currently meals range from 15 to 45 grams of carbohydrate with most meals around 30 grams  Explained basic pathophysiology of Type 1 diabetes and impact of diet on blood glucose levels  Together we discussed what foods contain CHO, reading a food label, and serving sizes  Used the portion booklet to teach Malissa Fernandez and his family more about food groups and basic carbohydrate counting  Created an individualized meal plan for Malissa Fernandez with 3 meals and up to 3 snacks providing 60 g carb per meal and 15 g carb per snack  This plan will promote weight maintnenence  Put together sample meals for Johnny's reference  Given the increase in carbohydrates reflected in Hoang's meal plan, consulted with Dr Flora Crowe and prandial insulin dose increased to 8 units Humalog plus sliding scale  Malissa Fernandez demonstrated good understanding  Malissa Fernandez is scheduled for initial carb counting appointment on November 28th and intro to pump class on December 10th as Hoang and his family have strong interest in advancing to flexible insulin and ultimately an insulin pump  Ht Readings from Last 1 Encounters:   11/21/18 5' 11" (1 803 m)     Wt Readings from Last 2 Encounters:   11/21/18 72 5 kg (159 lb 12 8 oz)   11/21/18 81 7 kg (180 lb 3 2 oz)     Weight Change:  Yes reports was 170 lbs before signs and symptoms of diabetes began, then lost 30 lbs, and has gained 20 lbs back since starting insulin     Medical Diagnosis/ICD10: E10 9 T1DM without complication    Barriers to Learning: no barriers    Do you follow any special diet presently?: Yes - tries to limit carbs  Who shops: patient, mother, father and sister  Who cooks: mother and sister    Food Log: Provided by patient  Please see attached  Exercise: Active job currently lifting ice, however, 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 and have to look for another job    Calorie needs 2600 kcals/day Carbs: 60 g/meal, 15 g/snack         Nutrition Diagnosis:  Inadequate carbohydrate intake  related to Food and nutrition related knowledge deficit concerning appropriate amount of dietary carbohydrate as evidenced by  Estimated carbohydrate intake less than recommended amounts    Intervention: increased fiber intake, label reading, carbohydrate counting, increased plant based foods and individualized meal plan     Treatment Goals: Patient understands education and recommendations, Patient will increase their intake of plant based foods and Patient will count carbohydrates    Monitoring and evaluation:    Term code indicator   1 6 3 Carbohydrate Intake Criteria: 60 grams of carbohydrate per meal, 3 meals per day, 4 - 5 hours apart  Snacks in 15 g carb amounts, 2 hours before/after dinner  Patients Response to Instruction:  Kj Viramontes  Expected Compliancegood    Thank you for coming to the [address] [place] [address] for education today  Please feel free to call with any questions or concerns      Yaima Sr, [address] [place] [phone]

## 2018-11-21 NOTE — PROGRESS NOTES
Assessment/Plan:       Diagnoses and all orders for this visit:    Type 1 diabetes mellitus without complication ([address] )      reviewed his history and discussed and answered his questions  Spent most the time filling out FMLA forms for his mother did continue to care for him during his new adjustment period  I reiterated that he must follow up with endocrinology not miss appointments and he must go through the diabetic education      Subjective:     Chief Complaint   Patient presents with    Transition of Care Management     discharged from [facility] on 11/10/18          Patient ID: Holly Cabrrea is a 19 y o  male  Patient here for LARISA BELLAMY after her hospitalization  Patient was recently diagnosed with type 1 diabetes after going into DKA  He has no acute physical complaints today and is feeling well  He is paperwork for his mother will also with him today so for FMLA so she can help him to his appointments will wear a adjusting his insulin regimen        The following portions of the patient's history were reviewed and updated as appropriate: allergies, current medications, past family history, past medical history, past social history, past surgical history and problem list     Review of Systems   Constitutional: Negative  HENT: Negative  Eyes: Negative  Respiratory: Negative  Cardiovascular: Negative  Gastrointestinal: Negative  Endocrine: Negative  Genitourinary: Negative  Musculoskeletal: Negative  Skin: Negative  Allergic/Immunologic: Negative  Neurological: Negative  Hematological: Negative  Psychiatric/Behavioral: Negative  All other systems reviewed and are negative          Objective:    Vitals:    11/21/18 1046   BP: 100/70   BP Location: Left arm   Patient Position: Sitting   Cuff Size: Standard   Pulse: 93   Resp: 14   Temp: 97 6 °F (36 4 °C)   TempSrc: Tympanic   SpO2: 98%   Weight: 81 7 kg (180 lb 3 2 oz)   Height: 5' 11" (1 803 m)          Physical Exam Constitutional: He is oriented to person, place, and time  He appears well-developed and well-nourished  No distress  HENT:   Head: Normocephalic  Right Ear: External ear normal    Left Ear: External ear normal    Nose: Nose normal    Mouth/Throat: Oropharynx is clear and moist    Eyes: Pupils are equal, round, and reactive to light  Conjunctivae and EOM are normal  Right eye exhibits no discharge  Left eye exhibits no discharge  Neck: Normal range of motion  Cardiovascular: Normal rate, regular rhythm and normal heart sounds  Pulmonary/Chest: Effort normal and breath sounds normal    Abdominal: Soft  Bowel sounds are normal  He exhibits no distension  There is no tenderness  Musculoskeletal: Normal range of motion  Neurological: He is alert and oriented to person, place, and time  No cranial nerve deficit  Skin: Skin is warm and dry  No rash noted  Psychiatric: He has a normal mood and affect  His behavior is normal  Judgment and thought content normal    Nursing note and vitals reviewed

## 2018-11-21 NOTE — PATIENT INSTRUCTIONS
1) 60 grams of carbohydrate per meal, 3 meals per day, 4 - 5 hours apart  2) Snacks 2 hours before/after meals in 15 gram carbohydrate amounts    3) Take 8 units Humalog 10 - 15 minutes before meals plus sliding scale per Dr Corey Mills

## 2018-11-21 NOTE — Clinical Note
Initial MNT complete  Note available for your review  Initial Carb Counting scheduled for 11/28/18, intro to pump scheduled for 12/10/18 at [facility]  Thank you

## 2018-11-28 ENCOUNTER — OFFICE VISIT (OUTPATIENT)
Dept: DIABETES SERVICES | Facility: HOSPITAL | Age: 18
End: 2018-11-28
Payer: COMMERCIAL

## 2018-11-28 DIAGNOSIS — E10.9 TYPE 1 DIABETES MELLITUS WITHOUT COMPLICATION (HCC): Primary | ICD-10-CM

## 2018-11-28 PROCEDURE — G0108 DIAB MANAGE TRN  PER INDIV: HCPCS | Performed by: DIETITIAN, REGISTERED

## 2018-11-28 NOTE — PATIENT INSTRUCTIONS
1) Complete 3 day food record with carb counts, Blood sugar readings, and insulin dosages  2) Coreen will call tomorrow to schedule Type 1 Basics appointment at [facility]

## 2018-11-28 NOTE — Clinical Note
Initial carb counting complete  Patient will complete and return 3 day carb count record for assessment of moving forward with flexible insulin  Patient is also scheduled for intro to pump at  and will be scheduling Type 1 Basics at  as well  Thank you

## 2018-11-28 NOTE — PROGRESS NOTES
Carbohydrate Counting Instruction    Met with Jj Ayse for carbohydrate counting  Jj Tavera is currently on the following insulin regimen: Lantus 20 units QHS, Humalog 8 units before meals plus sliding scale  Present at Session: patient and mother     Patient Instructed on: Carbohydrate counting  Used [employer], Food labels, measuring cups, and other props to teach label reading and carbohydrate counting  Patient completed one day food diary exercise during session with moderate difficulty and needed some assistance  At this time, Jj Tavera does not demonstrate the math skills necessary for successful carbohydrate counting and following a flexible insulin regimen  Reviewed food diary exercise and Rosario Monzon seemed to have a better understanding after reviewing incorrect counts  Diabetes Education Record  Jj Tavera received the following handouts: Portion Book, 3 day food logs      Patient response to instruction    Comprehensionfair  Motivationgood  Expected Compliancegood    When food logs returned and reviewed, if appropriate, will determine flexible insulin regimen with referring provider  Will then have  call Jj Tavera to schedule next visit  Thank you for referring your patient to [facility], it was a pleasure working with them today  Please feel free to call with any questions or concerns      Camila Butts, [address] [place] [phone]

## 2018-12-03 ENCOUNTER — TELEPHONE (OUTPATIENT)
Dept: ENDOCRINOLOGY | Facility: HOSPITAL | Age: 18
End: 2018-12-03

## 2018-12-03 NOTE — TELEPHONE ENCOUNTER
Spoke to mom to offer 12/18 appt with Dr Tamie Leggett for 4w fu   She refused but requested we try to get patient in week of 12/10 so it will not interfere with patient's work schedule

## 2018-12-04 ENCOUNTER — OFFICE VISIT (OUTPATIENT)
Dept: DIABETES SERVICES | Facility: CLINIC | Age: 18
End: 2018-12-04
Payer: COMMERCIAL

## 2018-12-04 DIAGNOSIS — E10.9 TYPE 1 DIABETES MELLITUS WITHOUT COMPLICATION (HCC): ICD-10-CM

## 2018-12-04 PROCEDURE — G0109 DIAB MANAGE TRN IND/GROUP: HCPCS

## 2018-12-05 ENCOUNTER — TELEPHONE (OUTPATIENT)
Dept: DIABETES SERVICES | Facility: CLINIC | Age: 18
End: 2018-12-05

## 2018-12-05 DIAGNOSIS — E10.9 TYPE 1 DIABETES MELLITUS WITHOUT COMPLICATION (HCC): Primary | ICD-10-CM

## 2018-12-05 NOTE — PATIENT INSTRUCTIONS
Appt for carb counting and type 1 basics schedule  Contact endo office if he decides on pump use  Contact sensor company

## 2018-12-05 NOTE — PROGRESS NOTES
Fern Claros was accompanied by his father to Intro to Pumping Class  Topics covered included:  Benefits of pumping  Basal/Bolus doing via pump  Medtronic, OmniPod and T-slim were shown and demonstrated  Material on all 3 pump were able  Fern Claros has been diagnosed about 2 months ago  He is using a Hany Freestyle sensor  He was given information on the DexCom and explained that the DexCom or pumps with integrated sensor will alert him if he is too high or low  This is not a current feature on the [employer]  Fern Claros is scheduled for a session on Type 1 basics  This is information he needs prior to pumping  He will need a script for Glucagon and Ketone urine test strips and this will be discussed with him in more detail at his type 1 session  He has meet with the dietitian but has not started flexible insulin dosing yet  He will need to be able to do this prior to pumping

## 2018-12-10 NOTE — TELEPHONE ENCOUNTER
Spoke to mom to offer 12/14/18 appt with Chico Blandon  She said patient went back to work this week and needs later appt   I advised I will keep him on cancellation list for 5:15 appt

## 2018-12-11 LAB
BUN SERPL-MCNC: 22 MG/DL (ref 7–25)
BUN/CREAT SERPL: ABNORMAL (CALC) (ref 6–22)
CALCIUM SERPL-MCNC: 9.7 MG/DL (ref 8.6–10.3)
CHLORIDE SERPL-SCNC: 100 MMOL/L (ref 98–110)
CO2 SERPL-SCNC: 29 MMOL/L (ref 20–32)
CREAT SERPL-MCNC: 0.92 MG/DL (ref 0.6–1.35)
EST. AVERAGE GLUCOSE BLD GHB EST-MCNC: 214 (CALC)
EST. AVERAGE GLUCOSE BLD GHB EST-SCNC: 11.9 (CALC)
GLUCOSE SERPL-MCNC: 252 MG/DL (ref 65–99)
HBA1C MFR BLD: 9.1 % OF TOTAL HGB
POTASSIUM SERPL-SCNC: 4.2 MMOL/L (ref 3.5–5.3)
SL AMB EGFR AFRICAN AMERICAN: 138 ML/MIN/1.73M2
SL AMB EGFR NON AFRICAN AMERICAN: 119 ML/MIN/1.73M2
SODIUM SERPL-SCNC: 138 MMOL/L (ref 135–146)

## 2018-12-12 ENCOUNTER — OFFICE VISIT (OUTPATIENT)
Dept: DIABETES SERVICES | Facility: CLINIC | Age: 18
End: 2018-12-12
Payer: COMMERCIAL

## 2018-12-12 ENCOUNTER — TELEPHONE (OUTPATIENT)
Dept: DIABETES SERVICES | Facility: CLINIC | Age: 18
End: 2018-12-12

## 2018-12-12 DIAGNOSIS — E10.65 TYPE 1 DIABETES MELLITUS WITH HYPERGLYCEMIA (HCC): Primary | ICD-10-CM

## 2018-12-12 PROCEDURE — G0108 DIAB MANAGE TRN  PER INDIV: HCPCS | Performed by: DIETITIAN, REGISTERED

## 2018-12-12 NOTE — PROGRESS NOTES
Type 1 Diabetes Basics    HPI: Met with Kathie Anders for DSME Initial visit  Patricia Puentes has Type 1 Diabetes  Diabetes Assessment  Visit Type: Initial visit  Present at Session: patient   Special Learning Needs: No  Barriers to Learning: no barriers    Do you monitor your blood sugar? yes  Type of blood sugar monitor: Freestyle jared and a regular meter but he doesn't know name  How old is your meter?: new  How often do you test your blood sugars?:4 or more times per day  Do you keep a written record of your blood sugars? Yes   Blood sugar log with patient today and reviewed by educator?: Yes   Blood Sugar ranges:    Fastin-279   Before meals: 133-254   2 hours after meals: 167-317  Any financial concerns pertaining to your diabetes supplies, medication or care?: No  Have you ever experienced hypoglycemia?:  No  Have you ever been hospitalized or gone to the ER due to your blood sugars?: Yes  How do you treat low blood sugars? : [address] [place] [address], provided additional education, counted out and prescribed 11 Sanabria Speck and Ikes to treat hypo as that what provides 15 g carb  How do you treat high blood sugars? educated  Do you wear a Diabetes I D ?: no, handout given  Where do you dispose of your sharps (needles,lancetes)?: In a separate container, reviewed how to dispose properly    Ht Readings from Last 1 Encounters:   18 5' 11" (1 803 m)     Wt Readings from Last 3 Encounters:   18 72 5 kg (159 lb 12 8 oz)   18 81 7 kg (180 lb 3 2 oz)   18 71 4 kg (157 lb 6 4 oz)        There is no height or weight on file to calculate BMI  Lab Results   Component Value Date    HGBA1C 9 1 (H) 12/10/2018    HGBA1C 13 6 (H) 2018     Weight Change: Yes lost and regained  The patient's history was reviewed and updated as appropriate: allergies, current medications      Intervention    Diabetes Overview :   Patricia Puentes was instructed on basic concepts of diabetes, including identifying role of diabetes self management, basic pathophysiology and types of diabetes, A1c and blood sugar targets  Juan Yu has good understanding of material covered  Taking Medications: Instructed patient on action, side effects, efficacy, prescribed dosage and appropriate timing and frequency of administration of his insulin  Juan Yu has good understanding of material covered  He reported that he is down to about 150 units of Novolog and the pharmacy said they cannot dispense a refill until January  Will contact endo for new rx  Advised him to keep insulin out of extreme temperatures and that he needs to keep insulin and meter with him when at work and traveling  Suggested a small string backpack or satchel for his supplies and explicitly told him not to leave supplies in his car  Monitoring Blood Sugars  Instructions for Meter Teaching- Patient instructed in the following:  Site selection and skin preparation, Loading strips and lancet device, meter activation, obtaining blood sample, test strip and lancet disposal and recording log book entries  Patient has good understanding of material covered  Goal Blood Sugars:   Premeal , even better <110  2hr after a meal <180, even better <140  A1C <7%, even better <6 5%  Hypoglycemia: Instructed patient on definition/risk of hypoglycemia, treatment, causes/symptoms, when to notify provider of lows, prevention of hypoglycemia and exercise precautions  Comments: Nicolle Hayes understanding of hypoglycemia concepts      Ketones: Instructed patient in testing for ketones if prolonged hyperglycemia and symptoms of acidosis or illness  Explained use of ketone urine strips and expiration, also provided information about blood ketone test kit and how readings differ and when to contact physician      Sick Days: Reviewed sick day rules, encouraged Hoang to check BG more frequently, keep taking insulin, and replace usual foods with other tolerated carb containing foods if needed  Diabetes Education Record  Gatodakota Boyle received the following handouts: Hypo/Hyperglycemia, Rule of 15, Sick Day Rules, Ketone Testing Medical Alert card      Patient response to instruction    Comprehensionvery good  Motivationvery good  Expected Compliancegood    Thank you for referring your patient to [facility], it was a pleasure working with them today  Please feel free to call with any questions or concerns      Cally Carr  [address] [facility] [phone]

## 2018-12-12 NOTE — TELEPHONE ENCOUNTER
Waqar Bolton today  States [facility] tells him he can't get refill on Novolog until January but he has only around 150 units left  Please send new order allowing up to 40 units per day as pharmacy thinks he's still only taking 5 units Novolog per meal for 15 units/day based on original order   He is really averaging around 30 units per day with his current order of 8 per meal with SS

## 2018-12-17 ENCOUNTER — OFFICE VISIT (OUTPATIENT)
Dept: DIABETES SERVICES | Facility: CLINIC | Age: 18
End: 2018-12-17
Payer: COMMERCIAL

## 2018-12-17 DIAGNOSIS — E10.9 TYPE 1 DIABETES MELLITUS WITHOUT COMPLICATION (HCC): Primary | ICD-10-CM

## 2018-12-17 DIAGNOSIS — E10.9 TYPE 1 DIABETES MELLITUS WITHOUT COMPLICATION (HCC): ICD-10-CM

## 2018-12-17 PROCEDURE — G0108 DIAB MANAGE TRN  PER INDIV: HCPCS | Performed by: DIETITIAN, REGISTERED

## 2018-12-17 NOTE — PATIENT INSTRUCTIONS
Store glucagon at room temperature in a dark place  If unused, glucagon kit needs to be replace once every year to year and a half  Check expiration date periodically

## 2018-12-17 NOTE — PROGRESS NOTES
Glucagon teaching    Met with with Henri Reid and his father for instruction of glucagon kit  Discussed the use of glucagon, reason for use, preparation and administration of injection, post injection recommendations and proper storage  I demonstrated glucagon use with demo kit in office  Hoang's father demonstrated use of the kit back to me  He has good understanding of use  Travis Perry has a glucagon kit for emergencies  Advised him to check the kit expiration date once a year on a date he will remember, such as his birthday, to ensure he always has an up-to-date kit  Jo Paget to notify the doctor when a severe low requiring glucagon occurs  Diabetes Education Record: Travis Perry was given the following education material: Glucagon Emergency Kit for severe hypoglycemia handout      Patient response to instruction  Comprehension: good  Motivation: good  Expected Compliance: good  Readiness: action  Barriers to Learning: none known     Start- Stop: 2:35-3:15  Total Minutes: 40 Minutes  Group or Individual Instruction: DSMT-I  Other: Dr Hudson Houser    Thank you for referring your patient to [facility], it was a pleasure working with them today  Please feel free to call with any questions or concerns      Shubham Ann  [address] [facility] [place] [phone]

## 2018-12-30 RX ORDER — LANCETS 28 GAUGE
EACH MISCELLANEOUS
Qty: 100 EACH | Refills: 0 | OUTPATIENT
Start: 2018-12-30

## 2018-12-30 RX ORDER — BLOOD-GLUCOSE METER
KIT MISCELLANEOUS
Refills: 0 | OUTPATIENT
Start: 2018-12-30

## 2019-01-07 ENCOUNTER — TELEPHONE (OUTPATIENT)
Dept: ENDOCRINOLOGY | Facility: HOSPITAL | Age: 19
End: 2019-01-07

## 2019-01-07 NOTE — TELEPHONE ENCOUNTER
PA started on covermymeds 1/7/19 for Humalog   It was approved until 1/7/2020 Approval number: [id number]

## 2019-01-29 DIAGNOSIS — E10.9 DM W/O COMPLICATION TYPE I (HCC): Primary | ICD-10-CM

## 2019-01-29 NOTE — TELEPHONE ENCOUNTER
[facility] called stating patients insurance does not cover lantus, but will cover basaglar  I refilled basaglar 20 units once daily as lantus was done, if okay please approve     MG

## 2019-03-01 ENCOUNTER — TELEPHONE (OUTPATIENT)
Dept: ENDOCRINOLOGY | Facility: HOSPITAL | Age: 19
End: 2019-03-01

## 2019-03-01 DIAGNOSIS — E87.6 HYPOKALEMIA: ICD-10-CM

## 2019-03-01 DIAGNOSIS — E10.9 TYPE 1 DIABETES MELLITUS WITHOUT COMPLICATION (HCC): Primary | ICD-10-CM

## 2019-03-01 NOTE — TELEPHONE ENCOUNTER
Patient re-scheduled for 3-28-19  Last blood work was done on 12-1-18 (in [address])  Do you want addt'l labs done prior to the March appt? Can be mailed to patient

## 2019-03-04 ENCOUNTER — TELEPHONE (OUTPATIENT)
Dept: FAMILY MEDICINE CLINIC | Facility: CLINIC | Age: 19
End: 2019-03-04

## 2019-03-04 NOTE — TELEPHONE ENCOUNTER
[facility] DUE: foot exam, eye exam, micro albumin, check up    PATIENT WAS PROVIDED DR MADRID @ [facility] NUMBER AND ADDRESS TO SCHEDULE A DM EYE EXAM (HE HAS NEVER HAD ONE)    PATIENT SCHEDULED A CHECK UP VISIT WITH DR CANTU /19 FOR DM FOOT EXAM AND CHECK UP VISIT

## 2019-03-06 DIAGNOSIS — E10.9 TYPE 1 DIABETES MELLITUS WITHOUT COMPLICATION (HCC): ICD-10-CM

## 2019-03-06 RX ORDER — INSULIN LISPRO 100 [IU]/ML
INJECTION, SOLUTION INTRAVENOUS; SUBCUTANEOUS
Refills: 0 | OUTPATIENT
Start: 2019-03-06

## 2019-03-10 DIAGNOSIS — E10.9 TYPE 1 DIABETES MELLITUS WITHOUT COMPLICATION (HCC): ICD-10-CM

## 2019-03-11 RX ORDER — INSULIN LISPRO 100 [IU]/ML
INJECTION, SOLUTION INTRAVENOUS; SUBCUTANEOUS
Refills: 0 | OUTPATIENT
Start: 2019-03-11

## 2019-03-20 DIAGNOSIS — E10.9 TYPE 1 DIABETES MELLITUS WITHOUT COMPLICATION (HCC): ICD-10-CM

## 2019-03-20 LAB
LEFT EYE DIABETIC RETINOPATHY: NORMAL
RIGHT EYE DIABETIC RETINOPATHY: NORMAL

## 2019-03-20 RX ORDER — INSULIN LISPRO 100 [IU]/ML
INJECTION, SOLUTION INTRAVENOUS; SUBCUTANEOUS
Refills: 0 | OUTPATIENT
Start: 2019-03-20

## 2019-03-21 ENCOUNTER — OFFICE VISIT (OUTPATIENT)
Dept: FAMILY MEDICINE CLINIC | Facility: CLINIC | Age: 19
End: 2019-03-21
Payer: COMMERCIAL

## 2019-03-21 VITALS
OXYGEN SATURATION: 98 % | BODY MASS INDEX: 24.78 KG/M2 | TEMPERATURE: 97.2 F | HEART RATE: 89 BPM | WEIGHT: 177 LBS | HEIGHT: 71 IN | RESPIRATION RATE: 13 BRPM | SYSTOLIC BLOOD PRESSURE: 124 MMHG | DIASTOLIC BLOOD PRESSURE: 74 MMHG

## 2019-03-21 DIAGNOSIS — E10.9 TYPE 1 DIABETES MELLITUS WITHOUT COMPLICATION (HCC): ICD-10-CM

## 2019-03-21 PROCEDURE — 99213 OFFICE O/P EST LOW 20 MIN: CPT | Performed by: FAMILY MEDICINE

## 2019-03-21 PROCEDURE — 3008F BODY MASS INDEX DOCD: CPT | Performed by: FAMILY MEDICINE

## 2019-03-21 NOTE — PROGRESS NOTES
Assessment/Plan:     Diagnoses and all orders for this visit:    Type 1 diabetes mellitus without complication (HCC)  -     insulin lispro (HUMALOG KWIKPEN) 100 units/mL injection pen; Inject 8 Units under the skin 3 (three) times a day with meals With sliding scale  -     Hemoglobin A1C; Future  -     Microalbumin / creatinine urine ratio  -     Comprehensive metabolic panel; Future      patient continues to follow with Endocrine  Labs ordered since he lost his SLIP that was given to him by Endocrine  Up-to-date on health maintenance  Follows up in 1 years or as needed since he sees Endocrine on a regular basis and has no other medical issues      Subjective:     Chief Complaint   Patient presents with    Follow-up     check up visit for DM 1        Patient ID: Fox Lee is a 19 y o  male  Patient is here for diabetic checkup  He reports no acute physical complaints  He does need a refill of Humalog  He states that he is following      The following portions of the patient's history were reviewed and updated as appropriate: allergies, current medications, past family history, past medical history, past social history, past surgical history and problem list     Review of Systems   Constitutional: Negative  HENT: Negative  Eyes: Negative  Respiratory: Negative  Cardiovascular: Negative  Gastrointestinal: Negative  Endocrine: Negative  Genitourinary: Negative  Musculoskeletal: Negative  Skin: Negative  Allergic/Immunologic: Negative  Neurological: Negative  Hematological: Negative  Psychiatric/Behavioral: Negative  All other systems reviewed and are negative          Objective:    Vitals:    03/21/19 1443   BP: 124/74   BP Location: Left arm   Patient Position: Sitting   Cuff Size: Standard   Pulse: 89   Resp: 13   Temp: (!) 97 2 °F (36 2 °C)   TempSrc: Tympanic   SpO2: 98%   Weight: 80 3 kg (177 lb)   Height: 5' 11" (1 803 m)          Physical Exam Constitutional: He is oriented to person, place, and time  He appears well-developed and well-nourished  No distress  HENT:   Head: Normocephalic  Right Ear: External ear normal    Left Ear: External ear normal    Nose: Nose normal    Mouth/Throat: Oropharynx is clear and moist    Eyes: Pupils are equal, round, and reactive to light  Conjunctivae and EOM are normal  Right eye exhibits no discharge  Left eye exhibits no discharge  Neck: Normal range of motion  Cardiovascular: Normal rate, regular rhythm and normal heart sounds  Pulses are no weak pulses  Pulses:       Dorsalis pedis pulses are 2+ on the right side, and 2+ on the left side  Posterior tibial pulses are 2+ on the right side, and 2+ on the left side  Pulmonary/Chest: Effort normal and breath sounds normal    Abdominal: Soft  Bowel sounds are normal  He exhibits no distension  There is no tenderness  Musculoskeletal: Normal range of motion  Feet:   Right Foot:   Skin Integrity: Negative for ulcer, skin breakdown, erythema, warmth, callus or dry skin  Left Foot:   Skin Integrity: Negative for ulcer, skin breakdown, erythema, warmth, callus or dry skin  Neurological: He is alert and oriented to person, place, and time  No cranial nerve deficit  Skin: Skin is warm and dry  No rash noted  Psychiatric: He has a normal mood and affect  His behavior is normal  Judgment and thought content normal    Nursing note and vitals reviewed  Patient's shoes and socks removed  Right Foot/Ankle   Right Foot Inspection  Skin Exam: skin normal skin not intact, no dry skin, no warmth, no callus, no erythema, no maceration, no abnormal color, no pre-ulcer, no ulcer and no callus                          Toe Exam: ROM and strength within normal limits  Sensory   Vibration: intact  Proprioception: intact   Monofilament testing: intact  Vascular  Capillary refills: < 3 seconds  The right DP pulse is 2+  The right PT pulse is 2+       Left Foot/Ankle  Left Foot Inspection  Skin Exam: skin normalskin not intact, no dry skin, no warmth, no erythema, no maceration, normal color, no pre-ulcer, no ulcer and no callus                         Toe Exam: ROM and strength within normal limits                   Sensory   Vibration: intact  Proprioception: intact  Monofilament: intact  Vascular  Capillary refills: < 3 seconds  The left DP pulse is 2+  The left PT pulse is 2+  Assign Risk Category:  No deformity present; No loss of protective sensation;  No weak pulses       Risk: 0

## 2019-03-26 LAB
ALBUMIN SERPL-MCNC: 4.3 G/DL (ref 3.5–5.5)
ALBUMIN/CREAT UR: <1.8 MG/G CREAT (ref 0–30)
ALBUMIN/GLOB SERPL: 2 {RATIO} (ref 1.2–2.2)
ALP SERPL-CCNC: 80 IU/L (ref 39–117)
ALT SERPL-CCNC: 92 IU/L (ref 0–44)
AST SERPL-CCNC: 56 IU/L (ref 0–40)
BILIRUB SERPL-MCNC: 0.4 MG/DL (ref 0–1.2)
BUN SERPL-MCNC: 21 MG/DL (ref 6–20)
BUN/CREAT SERPL: 21 (ref 9–20)
CALCIUM SERPL-MCNC: 9.6 MG/DL (ref 8.7–10.2)
CHLORIDE SERPL-SCNC: 98 MMOL/L (ref 96–106)
CO2 SERPL-SCNC: 26 MMOL/L (ref 20–29)
CREAT SERPL-MCNC: 0.98 MG/DL (ref 0.76–1.27)
CREAT UR-MCNC: 168.9 MG/DL
GLOBULIN SER-MCNC: 2.1 G/DL (ref 1.5–4.5)
GLUCOSE SERPL-MCNC: 238 MG/DL (ref 65–99)
HBA1C MFR BLD: 8 % (ref 4.8–5.6)
LABCORP COMMENT: NORMAL
MICROALBUMIN UR-MCNC: <3 UG/ML
POTASSIUM SERPL-SCNC: 4.4 MMOL/L (ref 3.5–5.2)
PROT SERPL-MCNC: 6.4 G/DL (ref 6–8.5)
SL AMB EGFR AFRICAN AMERICAN: 128 ML/MIN/1.73
SL AMB EGFR NON AFRICAN AMERICAN: 111 ML/MIN/1.73
SODIUM SERPL-SCNC: 137 MMOL/L (ref 134–144)

## 2019-03-28 ENCOUNTER — OFFICE VISIT (OUTPATIENT)
Dept: ENDOCRINOLOGY | Facility: HOSPITAL | Age: 19
End: 2019-03-28
Payer: COMMERCIAL

## 2019-03-28 VITALS
BODY MASS INDEX: 24.75 KG/M2 | HEART RATE: 76 BPM | DIASTOLIC BLOOD PRESSURE: 70 MMHG | SYSTOLIC BLOOD PRESSURE: 126 MMHG | HEIGHT: 71 IN | WEIGHT: 176.8 LBS

## 2019-03-28 DIAGNOSIS — E10.65 TYPE 1 DIABETES MELLITUS WITH HYPERGLYCEMIA (HCC): Primary | ICD-10-CM

## 2019-03-28 PROCEDURE — 99213 OFFICE O/P EST LOW 20 MIN: CPT | Performed by: NURSE PRACTITIONER

## 2019-03-28 PROCEDURE — 95251 CONT GLUC MNTR ANALYSIS I&R: CPT | Performed by: NURSE PRACTITIONER

## 2019-03-28 NOTE — PATIENT INSTRUCTIONS
Be mindful of diet  Stay active and stay hydrated  Continue to follow up with diabetes education and medical nutrition therapy to continue to learn about carbohydrate counting and eventually flexible insulin therapy  Increase Basaglar to 22 units  Increase Humalog sliding scale to:  150-224 - 9 units  225-299 - 10 units  300-374 - 11 units  375-449 - 12 units    Follow up with diabetes education to start learning about carbohydrate counting and flexible insulin therapy  Continue to utilize the [employer] and provide a record of your blood sugars in 2 weeks for review  You may come to the office for us to download her meter if you wish

## 2019-03-28 NOTE — PROGRESS NOTES
Kenn Stubbs 19 y o  male MRN: [id number]    Encounter: [id number]      Assessment/Plan     Assessment: This is a 19y o -year-old male with type 1 diabetes  Plan:  Type 1 diabetes:  His hemoglobin A1c has improved to 8 0  Review of his freestyle jared continuous continuous glucose monitor reveals hyperglycemia throughout the day with an average blood glucose over the past 2 weeks of 198  There is no recent hypoglycemia  I have asked him to adjust his sliding scale at meals slightly with the addition of 1 unit to each echelon as follows: Increase Humalog sliding scale to:  150-224 - 9 units  225-299 - 10 units  300-374 - 11 units  375-449 - 12 units  I have also asked him to increase his Basaglar to 22 units at bedtime  He will continue to follow up with diabetes education and medical nutrition therapy for carbohydrate counting and flexible insulin therapy  He has had pre pump training but states he has not yet ready for the pump  I have asked him to utilize the [employer] [facility] as much as possible and provide the office with a download in 2 weeks to reassess his glycemic control after changes made at today's visit  Check hemoglobin A1c and comprehensive metabolic panel prior to next office visit  CC:  Type 1 Diabetes follow-up    History of Present Illness     HPI:  19y o  year old male with type 1 diabetes diagnosed approximately 6 months ago  He was admitted to the hospital in early November in diabetic ketoacidosis and was found to have type 1 diabetes of relatively new onset  His most recent hemoglobin A1c from March 20, 2019 is 8 0  His increase in hunger has occurred with the addition of insulin  He states that his blurry vision is resolved  He states that he underwent a diabetic eye exam which showed no retinopathy  He is currently utilizing Humalog sliding scale at meals and Basaglar 20 units at bedtime  He denies numbness or tingling of the feet    He denies chest pain or shortness of breath  He still has some fatigue  He denies neuropathy, nephropathy, retinopathy, heart attack, stroke and claudication but does admit to none  He has been following up with medical nutrition therapy and diabetes education regularly to learn more about his diet and his diabetes      Hypoglycemic episodes: No never  Blood Sugar/Glucometer/Pump/CGM review:  He utilizes a freestyle jared continuous glucose monitor  Freestyle jared download at the time of the visit and reveals an average glucose of 198 with a standard deviation of 62 4  There is no hypoglycemia on the report  Review of Systems   Constitutional: Negative  Negative for chills, fatigue and fever  HENT: Negative  Negative for trouble swallowing and voice change  Eyes: Negative for photophobia, pain, discharge, redness, itching and visual disturbance  Respiratory: Negative for cough and shortness of breath  Cardiovascular: Negative for chest pain and palpitations  Gastrointestinal: Positive for diarrhea (recent illness)  Negative for abdominal pain, constipation, nausea and vomiting  Endocrine: Negative for cold intolerance, heat intolerance, polydipsia, polyphagia and polyuria  Genitourinary: Negative  Musculoskeletal: Negative  Skin: Negative  Allergic/Immunologic: Negative  Neurological: Negative for dizziness, syncope, light-headedness and headaches  Hematological: Negative  Psychiatric/Behavioral: Negative  All other systems reviewed and are negative  Historical Information   Past Medical History:   Diagnosis Date    Fracture of phalanx of toe     Resolved 2/1/2016      No past surgical history on file    Social History   Social History     Substance and Sexual Activity   Alcohol Use No     Social History     Substance and Sexual Activity   Drug Use No     Social History     Tobacco Use   Smoking Status Never Smoker   Smokeless Tobacco Never Used     Family History:   Family History Problem Relation Age of Onset    Diabetes Family     No Known Problems Mother     No Known Problems Father     No Known Problems Sister     No Known Problems Brother     No Known Problems Brother     No Known Problems Brother     No Known Problems Sister        Meds/Allergies   Current Outpatient Medications   Medication Sig Dispense Refill    acetone, urine, test strip 1 strip by Does not apply route as needed for high blood sugar 25 each 4    Continuous Blood Gluc  (FREESTYLE LAUREN READER) SHAYNA 1 each by Does not apply route 4 (four) times a day 1 Device 0    Continuous Blood Gluc Sensor (FREESTYLE LAUREN SENSOR SYSTEM) [facility] Check blood sugars 4-7 times a day 3 each 12    glucagon (GLUCAGON EMERGENCY) 1 MG injection Inject 1 mg under the skin once as needed for low blood sugar for up to 1 dose 1 each 5    insulin glargine (BASAGLAR KWIKPEN) 100 units/mL injection pen Inject 20 Units under the skin daily 5 pen 5    insulin lispro (HUMALOG KWIKPEN) 100 units/mL injection pen Inject 8 Units under the skin 3 (three) times a day with meals With sliding scale 5 pen 3     No current facility-administered medications for this visit  No Known Allergies    Objective   Vitals: Height 5' 11" (1 803 m), weight 80 2 kg (176 lb 12 8 oz)  Physical Exam   Constitutional: He is oriented to person, place, and time  He appears well-developed and well-nourished  Thin build   HENT:   Head: Normocephalic and atraumatic  Nose: Nose normal    Mouth/Throat: Oropharynx is clear and moist    Eyes: Pupils are equal, round, and reactive to light  Conjunctivae and EOM are normal    Neck: Normal range of motion  Neck supple  Cardiovascular: Normal rate, regular rhythm, normal heart sounds and intact distal pulses  Pulmonary/Chest: Effort normal and breath sounds normal    Abdominal: Soft  Bowel sounds are normal    Musculoskeletal: Normal range of motion     Neurological: He is alert and oriented to person, place, and time  Skin: Skin is warm and dry  Dry skin   Psychiatric: He has a normal mood and affect  His behavior is normal  Judgment and thought content normal    Vitals reviewed      Lab Results:   Lab Results   Component Value Date/Time    Hemoglobin A1C 8 0 (H) 03/22/2019 07:55 AM    Hemoglobin A1C 9 1 (H) 12/10/2018 08:26 AM    Hemoglobin A1C 13 6 (H) 11/05/2018 08:54 AM    WBC 4 47 11/08/2018 04:11 AM    WBC 17 61 (H) 11/07/2018 03:31 AM    White Blood Cell Count 6 5 11/05/2018 08:54 AM    Hemoglobin 11 5 (L) 11/08/2018 04:11 AM    Hemoglobin 17 6 (H) 11/07/2018 03:31 AM    Hemoglobin 18 0 (H) 11/05/2018 08:54 AM    HCT 49 8 11/05/2018 08:54 AM    Hematocrit 31 8 (L) 11/08/2018 04:11 AM    Hematocrit 50 9 (H) 11/07/2018 03:31 AM    MCV 83 11/08/2018 04:11 AM    MCV 87 11/07/2018 03:31 AM    MCV 85 11/05/2018 08:54 AM    Platelet Count 557 68/40/3924 08:54 AM    Platelets 058 51/46/3865 04:11 AM    Platelets 828 04/49/5151 03:31 AM    BUN 21 (H) 03/22/2019 07:55 AM    BUN 22 12/10/2018 08:26 AM    BUN 23 11/13/2018    BUN 14 11/10/2018 05:13 AM    BUN 9 11/09/2018 04:49 AM    BUN 11 11/05/2018 08:54 AM    Potassium 4 4 03/22/2019 07:55 AM    Potassium 4 2 12/10/2018 08:26 AM    Potassium 4 4 11/13/2018    Potassium 3 2 (L) 11/10/2018 05:13 AM    Potassium 3 3 (L) 11/09/2018 06:10 PM    Potassium 4 0 11/05/2018 08:54 AM    Chloride 98 03/22/2019 07:55 AM    Chloride 100 12/10/2018 08:26 AM    Chloride 101 11/13/2018    Chloride 104 11/10/2018 05:13 AM    Chloride 103 11/09/2018 04:49 AM    Chloride 95 (L) 11/05/2018 08:54 AM    CO2 26 03/22/2019 07:55 AM    CO2 29 12/10/2018 08:26 AM    CO2 33 (H) 11/13/2018    CO2 28 11/10/2018 05:13 AM    CO2 28 11/09/2018 04:49 AM    CO2 7 (LL) 11/05/2018 08:54 AM    CO2, i-STAT 12 (L) 11/07/2018 03:46 PM    CO2, i-STAT 11 (L) 11/07/2018 12:24 PM    CO2, i-STAT 8 (LL) 11/07/2018 10:32 AM    Creatinine 0 73 11/13/2018    Creatinine 0 54 (L) 11/10/2018 05:13 AM Creatinine 0 55 (L) 11/09/2018 04:49 AM    AST 56 (H) 03/22/2019 07:55 AM    AST 12 11/07/2018 03:31 AM    AST 17 11/05/2018 08:54 AM    ALT 92 (H) 03/22/2019 07:55 AM    ALT 30 11/07/2018 03:31 AM    ALT 17 11/05/2018 08:54 AM    Albumin 4 3 03/22/2019 07:55 AM    Albumin 5 0 11/07/2018 03:31 AM    Albumin 5 2 11/05/2018 08:54 AM    Globulin, Total 2 1 03/22/2019 07:55 AM    Globulin, Total 2 3 11/05/2018 08:54 AM       Portions of the record may have been created with voice recognition software  Occasional wrong word or "sound a like" substitutions may have occurred due to the inherent limitations of voice recognition software  Read the chart carefully and recognize, using context, where substitutions have occurred

## 2019-04-23 ENCOUNTER — OFFICE VISIT (OUTPATIENT)
Dept: DIABETES SERVICES | Facility: HOSPITAL | Age: 19
End: 2019-04-23
Payer: COMMERCIAL

## 2019-04-23 VITALS — BODY MASS INDEX: 23.98 KG/M2 | HEIGHT: 72 IN | WEIGHT: 177 LBS

## 2019-04-23 DIAGNOSIS — E10.65 UNCONTROLLED TYPE 1 DIABETES MELLITUS WITH HYPERGLYCEMIA (HCC): Primary | ICD-10-CM

## 2019-04-23 PROCEDURE — 98960 EDU&TRN PT SELF-MGMT NQHP 1: CPT | Performed by: DIETITIAN, REGISTERED

## 2019-05-06 DIAGNOSIS — E10.65 UNCONTROLLED TYPE 1 DIABETES MELLITUS WITH HYPERGLYCEMIA (HCC): Primary | ICD-10-CM

## 2019-05-06 RX ORDER — FLASH GLUCOSE SENSOR
KIT MISCELLANEOUS
Qty: 3 EACH | Refills: 3 | Status: SHIPPED | OUTPATIENT
Start: 2019-05-06 | End: 2020-08-13 | Stop reason: ALTCHOICE

## 2019-05-15 DIAGNOSIS — E10.65 TYPE 1 DIABETES MELLITUS WITH HYPERGLYCEMIA (HCC): Primary | ICD-10-CM

## 2019-05-15 RX ORDER — FLASH GLUCOSE SCANNING READER
EACH MISCELLANEOUS
Qty: 1 DEVICE | Refills: 0 | Status: SHIPPED | OUTPATIENT
Start: 2019-05-15 | End: 2020-08-13 | Stop reason: ALTCHOICE

## 2019-06-29 ENCOUNTER — TELEPHONE (OUTPATIENT)
Dept: OTHER | Facility: OTHER | Age: 19
End: 2019-06-29

## 2019-07-05 ENCOUNTER — TELEPHONE (OUTPATIENT)
Dept: ENDOCRINOLOGY | Facility: HOSPITAL | Age: 19
End: 2019-07-05

## 2019-07-05 NOTE — TELEPHONE ENCOUNTER
Pt says he needs letter stating that he is medically able to go skydiving   He has appt on 7/9/19 so if he does not  Monday he will  at his appt

## 2019-07-09 ENCOUNTER — OFFICE VISIT (OUTPATIENT)
Dept: ENDOCRINOLOGY | Facility: HOSPITAL | Age: 19
End: 2019-07-09
Payer: COMMERCIAL

## 2019-07-09 VITALS
SYSTOLIC BLOOD PRESSURE: 122 MMHG | WEIGHT: 174.8 LBS | HEIGHT: 71 IN | HEART RATE: 88 BPM | DIASTOLIC BLOOD PRESSURE: 68 MMHG | BODY MASS INDEX: 24.47 KG/M2

## 2019-07-09 DIAGNOSIS — E10.65 TYPE 1 DIABETES MELLITUS WITH HYPERGLYCEMIA (HCC): Primary | ICD-10-CM

## 2019-07-09 PROCEDURE — 99213 OFFICE O/P EST LOW 20 MIN: CPT | Performed by: NURSE PRACTITIONER

## 2019-07-09 NOTE — PROGRESS NOTES
Fariha Higgins 22 y o  male MRN: [id number]    Encounter: [id number]      Assessment/Plan     Assessment: This is a 22y o -year-old male with type 1 diabetes  Plan:  Type 1 diabetes: Review of his freestyle jared continuous continuous glucose monitor reveals wide blood sugar variability with an average blood glucose over the past 2 weeks of 163  There is no recent hypoglycemia  For now, he will continue Humalog and Basaglar at current dose  He will follow up with diabetes education and medical nutrition therapy for carbohydrate counting and flexible insulin therapy  He has had pre pump training but states he has not yet ready for the pump  I have [facility] to [facility] as much as possible  Check hemoglobin A1c and comprehensive metabolic panel and Lipid panel not completed prior to his office visit today  We will contact him with results  CC:  Type 1 Diabetes follow-up    History of Present Illness     HPI:  19y o  year old male with type 1 diabetes diagnosed approximately 6 months ago   He was admitted to the hospital in early November in diabetic ketoacidosis and was found to have type 1 diabetes  His most recent hemoglobin A1c from March 20, 2019 is 8 0   He is currently utilizing Humalog 9 units at breakfast and dinner base dose and 10 units at lunch with at meals and Basaglar 24 units at bedtime    He denies chest pain or shortness of breath  He underwent a diabetic eye exam on March 20, 2019 which showed no retinopathy    He denies neuropathy, nephropathy, retinopathy, heart attack, stroke and claudication but does admit to none       Hypoglycemic episodes: Rare, but does occur at times      Blood Sugar/Glucometer/Pump/CGM review:  He utilizes a freestyle jared continuous glucose monitor  Freestyle jared download at the time of the visit and reveals an average glucose of 163 with a standard deviation of 39 7  There is no hypoglycemia on the report       Review of Systems Constitutional: Negative  Negative for chills, fatigue and fever  HENT: Negative  Negative for trouble swallowing and voice change  Eyes: Negative for photophobia, pain, discharge, redness, itching and visual disturbance  Respiratory: Negative for cough and shortness of breath  Cardiovascular: Negative for chest pain and palpitations  Gastrointestinal: Negative for abdominal pain, constipation, diarrhea, nausea and vomiting  Endocrine: Negative for cold intolerance, heat intolerance, polydipsia, polyphagia and polyuria  Genitourinary: Negative  Musculoskeletal: Negative  Skin: Negative  Allergic/Immunologic: Negative  Neurological: Negative for dizziness, syncope, light-headedness and headaches  Hematological: Negative  Psychiatric/Behavioral: Negative  All other systems reviewed and are negative  Historical Information   Past Medical History:   Diagnosis Date    Fracture of phalanx of toe     Resolved 2/1/2016      No past surgical history on file    Social History   Social History     Substance and Sexual Activity   Alcohol Use No     Social History     Substance and Sexual Activity   Drug Use No     Social History     Tobacco Use   Smoking Status Never Smoker   Smokeless Tobacco Never Used     Family History:   Family History   Problem Relation Age of Onset    Diabetes Family     No Known Problems Mother     No Known Problems Father     No Known Problems Sister     No Known Problems Brother     No Known Problems Brother     No Known Problems Brother     No Known Problems Sister        Meds/Allergies   Current Outpatient Medications   Medication Sig Dispense Refill    acetone, urine, test strip 1 strip by Does not apply route as needed for high blood sugar 25 each 4    Continuous Blood Gluc  (FREESTYLE LAUREN 14 DAY READER) SHAYNA Check sugars 3 times a day 1 Device 0    Continuous Blood Gluc  (FREESTYLE LAUREN READER) SHAYNA 1 each by Does not apply route 4 (four) times a day 1 Device 0    Continuous Blood Gluc Sensor (FREESTYLE LAUREN 14 DAY SENSOR) [facility] Check blood sugars 4-7 times a day 3 each 3    Continuous Blood Gluc Sensor (FREESTYLE LAUREN SENSOR SYSTEM) [facility] Check blood sugars 4-7 times a day 3 each 12    glucagon (GLUCAGON EMERGENCY) 1 MG injection Inject 1 mg under the skin once as needed for low blood sugar for up to 1 dose 1 each 5    insulin glargine (BASAGLAR KWIKPEN) 100 units/mL injection pen Inject 20 Units under the skin daily (Patient taking differently: Inject 22 Units under the skin daily ) 5 pen 5    insulin lispro (HUMALOG KWIKPEN) 100 units/mL injection pen Inject 8 Units under the skin 3 (three) times a day with meals With sliding scale (Patient taking differently: Inject 8 Units under the skin 3 (three) times a day with meals With sliding scale) 5 pen 3     No current facility-administered medications for this visit  No Known Allergies    Objective   Vitals: Blood pressure 122/68, pulse 88, height 5' 11" (1 803 m), weight 79 3 kg (174 lb 12 8 oz)  Physical Exam   Constitutional: He is oriented to person, place, and time  He appears well-developed and well-nourished  Thin build   HENT:   Head: Normocephalic and atraumatic  Nose: Nose normal    Mouth/Throat: Oropharynx is clear and moist    Eyes: Pupils are equal, round, and reactive to light  Conjunctivae and EOM are normal    Neck: Normal range of motion  Neck supple  Cardiovascular: Normal rate, regular rhythm, normal heart sounds and intact distal pulses  Pulmonary/Chest: Effort normal and breath sounds normal    Abdominal: Soft  Bowel sounds are normal    Musculoskeletal: Normal range of motion  Neurological: He is alert and oriented to person, place, and time  Skin: Skin is warm and dry  Dry skin   Psychiatric: He has a normal mood and affect  His behavior is normal  Judgment and thought content normal    Vitals reviewed      Lab Results:   Lab Results   Component Value Date/Time    Hemoglobin A1C 8 0 (H) 03/22/2019 07:55 AM    Hemoglobin A1C 9 1 (H) 12/10/2018 08:26 AM    Hemoglobin A1C 13 6 (H) 11/05/2018 08:54 AM    WBC 4 47 11/08/2018 04:11 AM    WBC 17 61 (H) 11/07/2018 03:31 AM    White Blood Cell Count 6 5 11/05/2018 08:54 AM    Hemoglobin 11 5 (L) 11/08/2018 04:11 AM    Hemoglobin 17 6 (H) 11/07/2018 03:31 AM    Hemoglobin 18 0 (H) 11/05/2018 08:54 AM    HCT 49 8 11/05/2018 08:54 AM    Hematocrit 31 8 (L) 11/08/2018 04:11 AM    Hematocrit 50 9 (H) 11/07/2018 03:31 AM    MCV 83 11/08/2018 04:11 AM    MCV 87 11/07/2018 03:31 AM    MCV 85 11/05/2018 08:54 AM    Platelet Count 063 46/45/1174 08:54 AM    Platelets 604 51/56/8332 04:11 AM    Platelets 718 04/26/9245 03:31 AM    BUN 21 (H) 03/22/2019 07:55 AM    BUN 22 12/10/2018 08:26 AM    BUN 23 11/13/2018    BUN 14 11/10/2018 05:13 AM    BUN 9 11/09/2018 04:49 AM    BUN 11 11/05/2018 08:54 AM    Potassium 4 4 03/22/2019 07:55 AM    Potassium 4 2 12/10/2018 08:26 AM    Potassium 4 4 11/13/2018    Potassium 3 2 (L) 11/10/2018 05:13 AM    Potassium 3 3 (L) 11/09/2018 06:10 PM    Potassium 4 0 11/05/2018 08:54 AM    Chloride 98 03/22/2019 07:55 AM    Chloride 100 12/10/2018 08:26 AM    Chloride 101 11/13/2018    Chloride 104 11/10/2018 05:13 AM    Chloride 103 11/09/2018 04:49 AM    Chloride 95 (L) 11/05/2018 08:54 AM    CO2 26 03/22/2019 07:55 AM    CO2 29 12/10/2018 08:26 AM    CO2 33 (H) 11/13/2018    CO2 28 11/10/2018 05:13 AM    CO2 28 11/09/2018 04:49 AM    CO2 7 (LL) 11/05/2018 08:54 AM    CO2, i-STAT 12 (L) 11/07/2018 03:46 PM    CO2, i-STAT 11 (L) 11/07/2018 12:24 PM    CO2, i-STAT 8 (LL) 11/07/2018 10:32 AM    Creatinine 0 98 03/22/2019 07:55 AM    Creatinine 0 92 12/10/2018 08:26 AM    Creatinine 0 73 11/13/2018    Creatinine 0 54 (L) 11/10/2018 05:13 AM    Creatinine 0 55 (L) 11/09/2018 04:49 AM    AST 56 (H) 03/22/2019 07:55 AM    AST 12 11/07/2018 03:31 AM    AST 17 11/05/2018 08:54 AM ALT 92 (H) 03/22/2019 07:55 AM    ALT 30 11/07/2018 03:31 AM    ALT 17 11/05/2018 08:54 AM    Albumin 4 3 03/22/2019 07:55 AM    Albumin 5 0 11/07/2018 03:31 AM    Albumin 5 2 11/05/2018 08:54 AM    Globulin, Total 2 1 03/22/2019 07:55 AM    Globulin, Total 2 3 11/05/2018 08:54 AM       Portions of the record may have been created with voice recognition software  Occasional wrong word or "sound a like" substitutions may have occurred due to the inherent limitations of voice recognition software  Read the chart carefully and recognize, using context, where substitutions have occurred

## 2019-07-09 NOTE — PATIENT INSTRUCTIONS
Be mindful of diet      Stay active and stay hydrated      Continue to follow up with diabetes education and medical nutrition therapy to continue to learn about carbohydrate counting and eventually flexible insulin therapy      For now, continue Humalog and Basaglar at current dose      Follow up with diabetes education to start learning about carbohydrate counting and flexible insulin therapy      Continue to utilize the freestyle jared and provide a record of your blood sugars in 2 weeks for review      You may come to the office for us to download her meter if you wish  Obtain lab work as prescribed  We will contact you with the results

## 2019-07-11 LAB
ALBUMIN SERPL-MCNC: 4.5 G/DL (ref 3.6–5.1)
ALBUMIN/GLOB SERPL: 2 (CALC) (ref 1–2.5)
ALP SERPL-CCNC: 74 U/L (ref 40–115)
ALT SERPL-CCNC: 26 U/L (ref 9–46)
AST SERPL-CCNC: 19 U/L (ref 10–40)
BILIRUB SERPL-MCNC: 0.8 MG/DL (ref 0.2–1.2)
BUN SERPL-MCNC: 25 MG/DL (ref 7–25)
BUN/CREAT SERPL: ABNORMAL (CALC) (ref 6–22)
CALCIUM SERPL-MCNC: 9.6 MG/DL (ref 8.6–10.3)
CHLORIDE SERPL-SCNC: 103 MMOL/L (ref 98–110)
CHOLEST SERPL-MCNC: 175 MG/DL
CHOLEST/HDLC SERPL: 3.1 (CALC)
CO2 SERPL-SCNC: 27 MMOL/L (ref 20–32)
CREAT SERPL-MCNC: 1.07 MG/DL (ref 0.6–1.35)
EST. AVERAGE GLUCOSE BLD GHB EST-MCNC: 169 (CALC)
EST. AVERAGE GLUCOSE BLD GHB EST-SCNC: 9.3 (CALC)
GLOBULIN SER CALC-MCNC: 2.3 G/DL (CALC) (ref 1.9–3.7)
GLUCOSE SERPL-MCNC: 164 MG/DL (ref 65–99)
HBA1C MFR BLD: 7.5 % OF TOTAL HGB
HDLC SERPL-MCNC: 57 MG/DL
LDLC SERPL CALC-MCNC: 101 MG/DL (CALC)
NONHDLC SERPL-MCNC: 118 MG/DL (CALC)
POTASSIUM SERPL-SCNC: 4.4 MMOL/L (ref 3.5–5.3)
PROT SERPL-MCNC: 6.8 G/DL (ref 6.1–8.1)
SL AMB EGFR AFRICAN AMERICAN: 114 ML/MIN/1.73M2
SL AMB EGFR NON AFRICAN AMERICAN: 99 ML/MIN/1.73M2
SODIUM SERPL-SCNC: 138 MMOL/L (ref 135–146)
TRIGL SERPL-MCNC: 82 MG/DL

## 2019-07-11 NOTE — RESULT ENCOUNTER NOTE
Please call the patient regarding abnormal result  Hemoglobin A1c has improved to 7 5 but remains above goal   Please follow-up with medical nutrition training and diabetes education for carbohydrate counting and flexible insulin therapy  Please also check your blood sugars regularly and send a record to the office for so that changes can be made to her regimen to help her blood sugars throughout the day  Cholesterol panel looks okay    Random glucose is elevated on comprehensive metabolic panel but is otherwise normal

## 2019-07-18 NOTE — TELEPHONE ENCOUNTER
Pt lm with answering service 7/17 stating that the letter that was previously done by Maame Guardado cannot be accepted because he is not an MD  Previous letter is not in Epic and was not scanned in

## 2019-07-18 NOTE — TELEPHONE ENCOUNTER
Called and spoke to mom Elizabet Clay  She said we could fax but was unsure of the fax number and asked me to call her  to obtain fax number  lmom for Dad

## 2019-09-01 DIAGNOSIS — E10.9 TYPE 1 DIABETES MELLITUS WITHOUT COMPLICATION (HCC): Primary | ICD-10-CM

## 2019-09-05 ENCOUNTER — OFFICE VISIT (OUTPATIENT)
Dept: FAMILY MEDICINE CLINIC | Facility: CLINIC | Age: 19
End: 2019-09-05
Payer: COMMERCIAL

## 2019-09-05 VITALS
WEIGHT: 175.8 LBS | HEART RATE: 81 BPM | SYSTOLIC BLOOD PRESSURE: 118 MMHG | BODY MASS INDEX: 24.61 KG/M2 | TEMPERATURE: 98.3 F | HEIGHT: 71 IN | OXYGEN SATURATION: 98 % | DIASTOLIC BLOOD PRESSURE: 78 MMHG

## 2019-09-05 DIAGNOSIS — Z00.00 ENCOUNTER FOR WELL ADULT EXAM WITHOUT ABNORMAL FINDINGS: Primary | ICD-10-CM

## 2019-09-05 PROCEDURE — 99395 PREV VISIT EST AGE 18-39: CPT | Performed by: FAMILY MEDICINE

## 2019-09-05 NOTE — PROGRESS NOTES
Assessment/Plan:     Diagnoses and all orders for this visit:    Encounter for well adult exam without abnormal findings      42-year-old male  History of type 1 diabetes  Follows with Endocrine  Sugars are controlled  He has no acute physical complaints today he is up-to-date on health maintenance  Follow up 1 year or sooner if needed      Subjective:     Chief Complaint   Patient presents with    Annual Exam     complete physical 23 y/o male         Patient ID: Ranulfo Pal is a 22 y o  male  Patient is here for annual physical and he reports no acute physical complaints  Sugars are controlled  He sees Endocrine has been very compliant with his treatment        The following portions of the patient's history were reviewed and updated as appropriate: allergies, current medications, past family history, past medical history, past social history, past surgical history and problem list     Review of Systems   Constitutional: Negative  HENT: Negative  Eyes: Negative  Respiratory: Negative  Cardiovascular: Negative  Gastrointestinal: Negative  Endocrine: Negative  Genitourinary: Negative  Musculoskeletal: Negative  Skin: Negative  Allergic/Immunologic: Negative  Neurological: Negative  Hematological: Negative  Psychiatric/Behavioral: Negative  All other systems reviewed and are negative  Objective:    Vitals:    09/05/19 1612   BP: 118/78   BP Location: Left arm   Patient Position: Sitting   Cuff Size: Standard   Pulse: 81   Temp: 98 3 °F (36 8 °C)   TempSrc: Tympanic   SpO2: 98%   Weight: 79 7 kg (175 lb 12 8 oz)   Height: 5' 11" (1 803 m)          Physical Exam   Constitutional: He is oriented to person, place, and time  He appears well-developed and well-nourished  No distress  HENT:   Head: Normocephalic     Right Ear: External ear normal    Left Ear: External ear normal    Nose: Nose normal    Mouth/Throat: Oropharynx is clear and moist    Eyes: Pupils are equal, round, and reactive to light  Conjunctivae and EOM are normal  Right eye exhibits no discharge  Left eye exhibits no discharge  Neck: Normal range of motion  Cardiovascular: Normal rate, regular rhythm and normal heart sounds  Pulmonary/Chest: Effort normal and breath sounds normal    Abdominal: Soft  Bowel sounds are normal  He exhibits no distension  There is no tenderness  Musculoskeletal: Normal range of motion  Neurological: He is alert and oriented to person, place, and time  No cranial nerve deficit  Skin: Skin is warm and dry  No rash noted  Psychiatric: He has a normal mood and affect  His behavior is normal  Judgment and thought content normal    Nursing note and vitals reviewed

## 2019-09-14 DIAGNOSIS — E10.9 TYPE 1 DIABETES MELLITUS WITHOUT COMPLICATION (HCC): ICD-10-CM

## 2019-09-14 RX ORDER — FLASH GLUCOSE SENSOR
KIT MISCELLANEOUS
Qty: 3 EACH | Refills: 12 | Status: SHIPPED | OUTPATIENT
Start: 2019-09-14 | End: 2020-08-13 | Stop reason: ALTCHOICE

## 2019-10-02 DIAGNOSIS — E10.9 TYPE 1 DIABETES MELLITUS WITHOUT COMPLICATION (HCC): ICD-10-CM

## 2019-10-02 RX ORDER — INSULIN LISPRO 100 [IU]/ML
INJECTION, SOLUTION INTRAVENOUS; SUBCUTANEOUS
Refills: 3 | OUTPATIENT
Start: 2019-10-02

## 2019-10-16 LAB
ALBUMIN SERPL-MCNC: 4.5 G/DL (ref 3.6–5.1)
ALBUMIN/GLOB SERPL: 2.3 (CALC) (ref 1–2.5)
ALP SERPL-CCNC: 69 U/L (ref 40–115)
ALT SERPL-CCNC: 18 U/L (ref 9–46)
AST SERPL-CCNC: 17 U/L (ref 10–40)
BILIRUB SERPL-MCNC: 0.7 MG/DL (ref 0.2–1.2)
BUN SERPL-MCNC: 21 MG/DL (ref 7–25)
BUN/CREAT SERPL: ABNORMAL (CALC) (ref 6–22)
CALCIUM SERPL-MCNC: 9.6 MG/DL (ref 8.6–10.3)
CHLORIDE SERPL-SCNC: 100 MMOL/L (ref 98–110)
CHOLEST SERPL-MCNC: 172 MG/DL
CHOLEST/HDLC SERPL: 3.7 (CALC)
CO2 SERPL-SCNC: 29 MMOL/L (ref 20–32)
CREAT SERPL-MCNC: 1.13 MG/DL (ref 0.6–1.35)
EST. AVERAGE GLUCOSE BLD GHB EST-MCNC: 180 (CALC)
EST. AVERAGE GLUCOSE BLD GHB EST-SCNC: 10 (CALC)
GLOBULIN SER CALC-MCNC: 2 G/DL (CALC) (ref 1.9–3.7)
GLUCOSE SERPL-MCNC: 168 MG/DL (ref 65–99)
HBA1C MFR BLD: 7.9 % OF TOTAL HGB
HDLC SERPL-MCNC: 46 MG/DL
LDLC SERPL CALC-MCNC: 103 MG/DL (CALC)
NONHDLC SERPL-MCNC: 126 MG/DL (CALC)
POTASSIUM SERPL-SCNC: 4.2 MMOL/L (ref 3.5–5.3)
PROT SERPL-MCNC: 6.5 G/DL (ref 6.1–8.1)
SL AMB EGFR AFRICAN AMERICAN: 107 ML/MIN/1.73M2
SL AMB EGFR NON AFRICAN AMERICAN: 92 ML/MIN/1.73M2
SODIUM SERPL-SCNC: 138 MMOL/L (ref 135–146)
TRIGL SERPL-MCNC: 125 MG/DL

## 2019-10-17 DIAGNOSIS — E10.9 DM W/O COMPLICATION TYPE I (HCC): ICD-10-CM

## 2019-10-17 RX ORDER — INSULIN GLARGINE 100 [IU]/ML
INJECTION, SOLUTION SUBCUTANEOUS
Refills: 5 | OUTPATIENT
Start: 2019-10-17

## 2019-11-12 ENCOUNTER — TELEPHONE (OUTPATIENT)
Dept: FAMILY MEDICINE CLINIC | Facility: CLINIC | Age: 19
End: 2019-11-12

## 2019-11-12 DIAGNOSIS — E10.9 TYPE 1 DIABETES MELLITUS WITHOUT COMPLICATION (HCC): Primary | ICD-10-CM

## 2019-11-12 RX ORDER — INSULIN LISPRO 100 [IU]/ML
8 INJECTION, SOLUTION INTRAVENOUS; SUBCUTANEOUS
Qty: 5 PEN | Refills: 3 | Status: SHIPPED | OUTPATIENT
Start: 2019-11-12 | End: 2020-01-21 | Stop reason: CLARIF

## 2019-11-12 NOTE — TELEPHONE ENCOUNTER
insulin lispro (HUMALOG KWIKPEN) 100 units/mL injection pen  TID    [facility] [id number] [phone]  ANY QUESTIONS

## 2019-11-12 NOTE — TELEPHONE ENCOUNTER
Patient calling for refill of his Humalog Kwikpen  According to the Epic system it is no longer available  You will need to pick something from the list shown when you go to refill med    Please take care of this

## 2020-01-21 DIAGNOSIS — E10.9 TYPE 1 DIABETES MELLITUS WITHOUT COMPLICATION (HCC): Primary | ICD-10-CM

## 2020-01-21 RX ORDER — BLOOD-GLUCOSE TRANSMITTER
EACH MISCELLANEOUS
Qty: 1 EACH | Refills: 3 | Status: SHIPPED | OUTPATIENT
Start: 2020-01-21 | End: 2021-03-11

## 2020-01-21 RX ORDER — BLOOD-GLUCOSE,RECEIVER,CONT
EACH MISCELLANEOUS
Qty: 1 DEVICE | Refills: 0 | Status: SHIPPED | OUTPATIENT
Start: 2020-01-21

## 2020-01-21 RX ORDER — BLOOD-GLUCOSE SENSOR
EACH MISCELLANEOUS
Qty: 3 EACH | Refills: 11 | Status: SHIPPED | OUTPATIENT
Start: 2020-01-21 | End: 2020-05-07 | Stop reason: SDUPTHER

## 2020-01-21 NOTE — TELEPHONE ENCOUNTER
Pt left message stating his insurance no longer covers the [place]  He is asking if we can send everything to get him started on Dexcom?

## 2020-01-21 NOTE — TELEPHONE ENCOUNTER
[facility] called reporting that patient's insurance NO LONGER  covers the Humalog [place]  They DO cover the Laura Mellow  They are asking for an order for the Laura García

## 2020-01-28 ENCOUNTER — OFFICE VISIT (OUTPATIENT)
Dept: ENDOCRINOLOGY | Facility: HOSPITAL | Age: 20
End: 2020-01-28
Payer: COMMERCIAL

## 2020-01-28 VITALS
WEIGHT: 174.2 LBS | HEART RATE: 81 BPM | DIASTOLIC BLOOD PRESSURE: 78 MMHG | HEIGHT: 71 IN | BODY MASS INDEX: 24.39 KG/M2 | SYSTOLIC BLOOD PRESSURE: 122 MMHG

## 2020-01-28 DIAGNOSIS — E10.65 TYPE 1 DIABETES MELLITUS WITH HYPERGLYCEMIA (HCC): Primary | ICD-10-CM

## 2020-01-28 DIAGNOSIS — E10.65 UNCONTROLLED TYPE 1 DIABETES MELLITUS WITH HYPERGLYCEMIA (HCC): ICD-10-CM

## 2020-01-28 PROCEDURE — 99214 OFFICE O/P EST MOD 30 MIN: CPT | Performed by: NURSE PRACTITIONER

## 2020-01-28 PROCEDURE — 95251 CONT GLUC MNTR ANALYSIS I&R: CPT | Performed by: NURSE PRACTITIONER

## 2020-01-28 NOTE — PATIENT INSTRUCTIONS
Be mindful of diet      Stay active and stay hydrated      Follow up with diabetes education and medical nutrition therapy to continue to learn about carbohydrate counting and eventually flexible insulin therapy      For now, continue Basaglar at current dose  Continue Humalog at breakfast at 9 units, increase to 10 units at lunch and dinner      Continue to utilize the freestyle jared until the DEX COM is started  His follow up with diabetic education for restart and review of your dex com  Watch your e-mail for a dex com clarity invite to share your dex com information with our office      Obtain lab work as prescribed  We will contact you with the results

## 2020-01-28 NOTE — PROGRESS NOTES
Ugo Haas 22 y o  male MRN: [id number]    Encounter: [id number]      Assessment/Plan     Assessment: This is a 22y o -year-old male with type 1 diabetes  Plan:  Type 1 diabetes: There is no recent hemoglobin A1c   Review of his freestyle jared continuous continuous glucose monitor reveals wide blood sugar variability with an average blood glucose over the past 2 weeks of 188  There is no recent hypoglycemia  For now, he will continue Basaglar at current dose  I have asked him to continue Humalog at breakfast at 9 units and increase to 10 units at lunch and dinner  He will be starting a dex com continuous glucose monitor in the next 5 days  I have asked him to follow up with diabetes education for start up dex com education  I have also asked him to follow up with diabetes education and medical nutrition therapy for carbohydrate counting and flexible insulin therapy  Briseyda Manley has had pre pump training but states he has not yet ready for the pump   I have asked him to utilize the freestyle jared as much as possible  Check hemoglobin A1c and comprehensive metabolic panel and Lipid panel not completed prior to his office visit today  We will contact him with results  CC:  Type 1 Diabetes follow-up    History of Present Illness     HPI:  22y o  year old male with type 1 diabetes diagnosed approximately 6 months ago   He was admitted to the hospital in early November in diabetic ketoacidosis and was found to have type 1 diabetes  His most recent hemoglobin A1c from October 15, 2019 is 7 9   He is currently utilizing Humalog 9 units at breakfast and dinner base dose and 10 units at lunch with at meals and Basaglar 24 units at bedtime  He denies chest pain or shortness of breath  He obtained a diabetic eye exam on March 20, 2019 which showed no retinopathy  His most recent diabetic foot exam was performed on March 21, 2019   He denies neuropathy, nephropathy, retinopathy, heart attack, stroke and claudication but does admit to none       Hypoglycemic episodes: Rare      Blood Sugar/Glucometer/Pump/CGM review:  He utilizes a freestyle jared continuous glucose monitor  Freestyle jared download at the time of the visit and reveals an average glucose of 188 with a standard deviation of 77 7   There is no hypoglycemia on the report  Review of Systems   Constitutional: Negative  Negative for chills, fatigue and fever  HENT: Negative  Negative for trouble swallowing and voice change  Eyes: Negative for photophobia, pain, discharge, redness, itching and visual disturbance  Respiratory: Negative for cough and shortness of breath  Cardiovascular: Negative for chest pain and palpitations  Gastrointestinal: Negative for abdominal pain, constipation, diarrhea, nausea and vomiting  Endocrine: Negative for cold intolerance, heat intolerance, polydipsia, polyphagia and polyuria  Genitourinary: Negative  Musculoskeletal: Negative  Skin: Negative  Allergic/Immunologic: Negative  Neurological: Negative for dizziness, syncope, light-headedness and headaches  Hematological: Negative  Psychiatric/Behavioral: Negative  All other systems reviewed and are negative  Historical Information   Past Medical History:   Diagnosis Date    Fracture of phalanx of toe     Resolved 2/1/2016      No past surgical history on file    Social History   Social History     Substance and Sexual Activity   Alcohol Use No     Social History     Substance and Sexual Activity   Drug Use No     Social History     Tobacco Use   Smoking Status Never Smoker   Smokeless Tobacco Never Used     Family History:   Family History   Problem Relation Age of Onset    Diabetes Family     No Known Problems Mother     No Known Problems Father     No Known Problems Sister     No Known Problems Brother     No Known Problems Brother     No Known Problems Brother     No Known Problems Sister        Meds/Allergies Current Outpatient Medications   Medication Sig Dispense Refill    acetone, urine, test strip 1 strip by Does not apply route as needed for high blood sugar 25 each 4    Continuous Blood Gluc  (DEXCOM G6 ) SHAYNA Use  to scan blood sugars daily  1 Device 0    Continuous Blood Gluc  (FREESTYLE LAUREN 14 DAY READER) SHAYNA Check sugars 3 times a day 1 Device 0    Continuous Blood Gluc  (FREESTYLE LAUREN READER) SHAYNA 1 each by Does not apply route 4 (four) times a day 1 Device 0    Continuous Blood Gluc Sensor (DEXCOM G6 SENSOR) MISC Change sensor every 10 days  3 each 11    Continuous Blood Gluc Sensor (FREESTYLE LAUREN 14 DAY SENSOR) MISC Check blood sugars 4-7 times a day 3 each 3    Continuous Blood Gluc Sensor (FREESTYLE LAUREN SENSOR SYSTEM) MISC Check blood sugars 4-7 times a day 3 each 12    Continuous Blood Gluc Transmit (DEXCOM G6 TRANSMITTER) MISC Change transmitter every 90 days  1 each 3    glucagon (GLUCAGON EMERGENCY) 1 MG injection Inject 1 mg under the skin once as needed for low blood sugar for up to 1 dose 1 each 5    insulin aspart (NOVOLOG FLEXPEN) 100 Units/mL injection pen Inject 8 Units under the skin 3 (three) times a day with meals 5 pen 3    insulin glargine (BASAGLAR KWIKPEN) 100 units/mL injection pen Inject 20 Units under the skin daily (Patient taking differently: Inject 22 Units under the skin daily ) 5 pen 5    Insulin Pen Needle (BD PEN NEEDLE ABELARDO 2ND GEN) 32G X 4 MM MISC Up to 5 times daily  200 each 5     No current facility-administered medications for this visit  No Known Allergies    Objective   Vitals: There were no vitals taken for this visit  Physical Exam   Constitutional: He is oriented to person, place, and time  He appears well-developed and well-nourished  HENT:   Head: Normocephalic and atraumatic     Nose: Nose normal    Mouth/Throat: Oropharynx is clear and moist    Eyes: Pupils are equal, round, and reactive to light  Conjunctivae and EOM are normal    Neck: Normal range of motion  Neck supple  Cardiovascular: Normal rate, regular rhythm, normal heart sounds and intact distal pulses  Pulmonary/Chest: Effort normal and breath sounds normal    Abdominal: Soft  Bowel sounds are normal    Musculoskeletal: Normal range of motion  Neurological: He is alert and oriented to person, place, and time  Skin: Skin is warm and dry  Psychiatric: He has a normal mood and affect  His behavior is normal  Judgment and thought content normal    Vitals reviewed  Lab Results:   Lab Results   Component Value Date/Time    Hemoglobin A1C 7 9 (H) 10/15/2019 07:20 AM    Hemoglobin A1C 7 5 (H) 07/10/2019 08:40 AM    Hemoglobin A1C 8 0 (H) 03/22/2019 07:55 AM    BUN 21 10/15/2019 07:20 AM    BUN 25 07/10/2019 08:40 AM    BUN 21 (H) 03/22/2019 07:55 AM    Potassium 4 2 10/15/2019 07:20 AM    Potassium 4 4 07/10/2019 08:40 AM    Potassium 4 4 03/22/2019 07:55 AM    Chloride 100 10/15/2019 07:20 AM    Chloride 103 07/10/2019 08:40 AM    Chloride 98 03/22/2019 07:55 AM    CO2 29 10/15/2019 07:20 AM    CO2 27 07/10/2019 08:40 AM    CO2 26 03/22/2019 07:55 AM    Creatinine 1 13 10/15/2019 07:20 AM    Creatinine 1 07 07/10/2019 08:40 AM    Creatinine 0 98 03/22/2019 07:55 AM    AST 17 10/15/2019 07:20 AM    AST 19 07/10/2019 08:40 AM    AST 56 (H) 03/22/2019 07:55 AM    ALT 18 10/15/2019 07:20 AM    ALT 26 07/10/2019 08:40 AM    ALT 92 (H) 03/22/2019 07:55 AM    Albumin 4 5 10/15/2019 07:20 AM    Albumin 4 5 07/10/2019 08:40 AM    Albumin 4 3 03/22/2019 07:55 AM    Globulin 2 0 10/15/2019 07:20 AM    Globulin 2 3 07/10/2019 08:40 AM    Globulin, Total 2 1 03/22/2019 07:55 AM    HDL 46 10/15/2019 07:20 AM    HDL 57 07/10/2019 08:40 AM    Triglycerides 125 10/15/2019 07:20 AM    Triglycerides 82 07/10/2019 08:40 AM     Portions of the record may have been created with voice recognition software   Occasional wrong word or "sound a like" substitutions may have occurred due to the inherent limitations of voice recognition software  Read the chart carefully and recognize, using context, where substitutions have occurred

## 2020-02-13 LAB
ALBUMIN SERPL-MCNC: 4.4 G/DL (ref 3.6–5.1)
ALBUMIN/GLOB SERPL: 2.6 (CALC) (ref 1–2.5)
ALP SERPL-CCNC: 67 U/L (ref 36–130)
ALT SERPL-CCNC: 17 U/L (ref 9–46)
AST SERPL-CCNC: 16 U/L (ref 10–40)
BILIRUB SERPL-MCNC: 0.6 MG/DL (ref 0.2–1.2)
BUN SERPL-MCNC: 21 MG/DL (ref 7–25)
BUN/CREAT SERPL: ABNORMAL (CALC) (ref 6–22)
CALCIUM SERPL-MCNC: 9.3 MG/DL (ref 8.6–10.3)
CHLORIDE SERPL-SCNC: 102 MMOL/L (ref 98–110)
CO2 SERPL-SCNC: 29 MMOL/L (ref 20–32)
CREAT SERPL-MCNC: 1.04 MG/DL (ref 0.6–1.35)
EST. AVERAGE GLUCOSE BLD GHB EST-MCNC: 200 (CALC)
EST. AVERAGE GLUCOSE BLD GHB EST-SCNC: 11.1 (CALC)
GLOBULIN SER CALC-MCNC: 1.7 G/DL (CALC) (ref 1.9–3.7)
GLUCOSE SERPL-MCNC: 140 MG/DL (ref 65–99)
HBA1C MFR BLD: 8.6 % OF TOTAL HGB
POTASSIUM SERPL-SCNC: 4.4 MMOL/L (ref 3.5–5.3)
PROT SERPL-MCNC: 6.1 G/DL (ref 6.1–8.1)
SL AMB EGFR AFRICAN AMERICAN: 118 ML/MIN/1.73M2
SL AMB EGFR NON AFRICAN AMERICAN: 102 ML/MIN/1.73M2
SODIUM SERPL-SCNC: 138 MMOL/L (ref 135–146)

## 2020-02-18 ENCOUNTER — TELEPHONE (OUTPATIENT)
Dept: ENDOCRINOLOGY | Facility: HOSPITAL | Age: 20
End: 2020-02-18

## 2020-02-18 NOTE — TELEPHONE ENCOUNTER
Reviewed Hoang's dex com continuous glucose monitor from January 31 through February 13, 2020  He has an average glucose of 199 with a standard deviation of 74  His control is improving during the day but he is elevated from 7:00 p m  Until 7:00 a m  The next morning  For this, increase  Humalog to 12 units at dinner and increase Basaglar to 26 units  Continue to utilize the dex com continuous glucose monitor and send a report in 2 weeks for review

## 2020-02-26 DIAGNOSIS — E10.65 TYPE 1 DIABETES MELLITUS WITH HYPERGLYCEMIA (HCC): Primary | ICD-10-CM

## 2020-02-26 RX ORDER — BLOOD-GLUCOSE METER
KIT MISCELLANEOUS
Qty: 400 EACH | Refills: 3 | Status: SHIPPED | OUTPATIENT
Start: 2020-02-26

## 2020-04-29 LAB
ALBUMIN SERPL-MCNC: 4.7 G/DL (ref 4.1–5.2)
ALBUMIN/CREAT UR: 4 MG/G CREAT (ref 0–29)
ALBUMIN/GLOB SERPL: 2.5 {RATIO} (ref 1.2–2.2)
ALP SERPL-CCNC: 79 IU/L (ref 39–117)
ALT SERPL-CCNC: 19 IU/L (ref 0–44)
AST SERPL-CCNC: 18 IU/L (ref 0–40)
BILIRUB SERPL-MCNC: 0.5 MG/DL (ref 0–1.2)
BUN SERPL-MCNC: 31 MG/DL (ref 6–20)
BUN/CREAT SERPL: 30 (ref 9–20)
CALCIUM SERPL-MCNC: 9.5 MG/DL (ref 8.7–10.2)
CHLORIDE SERPL-SCNC: 99 MMOL/L (ref 96–106)
CHOLEST SERPL-MCNC: 319 MG/DL (ref 100–199)
CO2 SERPL-SCNC: 24 MMOL/L (ref 20–29)
CREAT SERPL-MCNC: 1.03 MG/DL (ref 0.76–1.27)
CREAT UR-MCNC: 118.4 MG/DL
CYTOLOGY CMNT CVX/VAG CYTO-IMP: ABNORMAL
EST. AVERAGE GLUCOSE BLD GHB EST-MCNC: 166 MG/DL
GLOBULIN SER-MCNC: 1.9 G/DL (ref 1.5–4.5)
GLUCOSE SERPL-MCNC: 177 MG/DL (ref 65–99)
HBA1C MFR BLD: 7.4 % (ref 4.8–5.6)
HDLC SERPL-MCNC: 70 MG/DL
LDLC SERPL CALC-MCNC: 231 MG/DL (ref 0–99)
MICROALBUMIN UR-MCNC: 4.2 UG/ML
POTASSIUM SERPL-SCNC: 4.5 MMOL/L (ref 3.5–5.2)
PROT SERPL-MCNC: 6.6 G/DL (ref 6–8.5)
SL AMB EGFR AFRICAN AMERICAN: 119 ML/MIN/1.73
SL AMB EGFR NON AFRICAN AMERICAN: 103 ML/MIN/1.73
SL AMB VLDL CHOLESTEROL CALC: 18 MG/DL (ref 5–40)
SODIUM SERPL-SCNC: 139 MMOL/L (ref 134–144)
TRIGL SERPL-MCNC: 89 MG/DL (ref 0–149)
TSH SERPL DL<=0.005 MIU/L-ACNC: 1.54 UIU/ML (ref 0.45–4.5)

## 2020-04-29 PROCEDURE — 3061F NEG MICROALBUMINURIA REV: CPT | Performed by: NURSE PRACTITIONER

## 2020-05-07 ENCOUNTER — TELEMEDICINE (OUTPATIENT)
Dept: ENDOCRINOLOGY | Facility: HOSPITAL | Age: 20
End: 2020-05-07
Payer: COMMERCIAL

## 2020-05-07 VITALS — WEIGHT: 176 LBS | BODY MASS INDEX: 24.55 KG/M2

## 2020-05-07 DIAGNOSIS — E10.9 TYPE 1 DIABETES MELLITUS WITHOUT COMPLICATION (HCC): Primary | ICD-10-CM

## 2020-05-07 PROCEDURE — 99213 OFFICE O/P EST LOW 20 MIN: CPT | Performed by: INTERNAL MEDICINE

## 2020-05-07 RX ORDER — BLOOD-GLUCOSE SENSOR
EACH MISCELLANEOUS
Qty: 3 EACH | Refills: 11 | Status: SHIPPED | OUTPATIENT
Start: 2020-05-07 | End: 2020-12-10 | Stop reason: SDUPTHER

## 2020-05-13 ENCOUNTER — TELEPHONE (OUTPATIENT)
Dept: OTHER | Facility: OTHER | Age: 20
End: 2020-05-13

## 2020-06-29 DIAGNOSIS — E10.9 TYPE 1 DIABETES MELLITUS WITHOUT COMPLICATION (HCC): ICD-10-CM

## 2020-07-02 DIAGNOSIS — E10.9 DM W/O COMPLICATION TYPE I (HCC): ICD-10-CM

## 2020-07-05 ENCOUNTER — TELEPHONE (OUTPATIENT)
Dept: OTHER | Facility: OTHER | Age: 20
End: 2020-07-05

## 2020-07-05 ENCOUNTER — DOCUMENTATION (OUTPATIENT)
Dept: OTHER | Facility: HOSPITAL | Age: 20
End: 2020-07-05

## 2020-07-05 DIAGNOSIS — E10.65 TYPE 1 DIABETES MELLITUS WITH HYPERGLYCEMIA (HCC): Primary | ICD-10-CM

## 2020-07-05 NOTE — TELEPHONE ENCOUNTER
TigerText:    [phone]/ PT Sonia English  [phone]/ PT called saying he needed a refill on his [place] for today

## 2020-07-06 DIAGNOSIS — E10.9 DM W/O COMPLICATION TYPE I (HCC): ICD-10-CM

## 2020-07-06 DIAGNOSIS — E10.9 TYPE 1 DIABETES MELLITUS WITHOUT COMPLICATION (HCC): ICD-10-CM

## 2020-08-04 LAB
CHOLEST SERPL-MCNC: 281 MG/DL (ref 100–199)
CYTOLOGY CMNT CVX/VAG CYTO-IMP: ABNORMAL
EST. AVERAGE GLUCOSE BLD GHB EST-MCNC: 157 MG/DL
HBA1C MFR BLD: 7.1 % (ref 4.8–5.6)
HDLC SERPL-MCNC: 63 MG/DL
LDLC SERPL CALC-MCNC: 196 MG/DL (ref 0–99)
LDLC/HDLC SERPL: 3.1 RATIO (ref 0–3.6)
SL AMB VLDL CHOLESTEROL CALC: 22 MG/DL (ref 5–40)
TRIGL SERPL-MCNC: 112 MG/DL (ref 0–149)

## 2020-08-04 PROCEDURE — 3051F HG A1C>EQUAL 7.0%<8.0%: CPT | Performed by: NURSE PRACTITIONER

## 2020-08-13 ENCOUNTER — OFFICE VISIT (OUTPATIENT)
Dept: ENDOCRINOLOGY | Facility: HOSPITAL | Age: 20
End: 2020-08-13
Payer: COMMERCIAL

## 2020-08-13 VITALS
DIASTOLIC BLOOD PRESSURE: 80 MMHG | SYSTOLIC BLOOD PRESSURE: 126 MMHG | BODY MASS INDEX: 24.61 KG/M2 | HEART RATE: 80 BPM | WEIGHT: 175.8 LBS | HEIGHT: 71 IN

## 2020-08-13 DIAGNOSIS — E10.65 TYPE 1 DIABETES MELLITUS WITH HYPERGLYCEMIA (HCC): Primary | ICD-10-CM

## 2020-08-13 DIAGNOSIS — E78.41 ELEVATED LIPOPROTEIN(A): ICD-10-CM

## 2020-08-13 PROCEDURE — 3008F BODY MASS INDEX DOCD: CPT | Performed by: NURSE PRACTITIONER

## 2020-08-13 PROCEDURE — 99214 OFFICE O/P EST MOD 30 MIN: CPT | Performed by: NURSE PRACTITIONER

## 2020-08-13 PROCEDURE — 2022F DILAT RTA XM EVC RTNOPTHY: CPT | Performed by: NURSE PRACTITIONER

## 2020-08-13 PROCEDURE — 1036F TOBACCO NON-USER: CPT | Performed by: NURSE PRACTITIONER

## 2020-08-13 RX ORDER — ROSUVASTATIN CALCIUM 5 MG/1
5 TABLET, COATED ORAL DAILY
Qty: 90 TABLET | Refills: 3 | Status: SHIPPED | OUTPATIENT
Start: 2020-08-13

## 2020-08-13 NOTE — PROGRESS NOTES
Amanda Newman 23 y o  male MRN: [id number]    Encounter: [id number]      Assessment/Plan     Assessment: This is a 23y o -year-old male with type 1 diabetes  Plan:  1  Type 1 diabetes:  His most recent hemoglobin A1c has improved to 7 1  Review of his dex com download reveals some hyperglycemia starting in the later afternoon into the evening after dinner  He is also seeing a rise in his blood sugars after lunch  I have asked him to continue NovoLog 5 units with breakfast but increase to 6 units with lunch and dinner  He will continue Basaglar at current dose   He will continue to utilize the dex com continuous glucose monitor and send a download to the office for 2 weeks for review and further adjustment, if necessary  Diabetic foot exam performed in the office today reveals good monofilament sensation  Check hemoglobin A1c and comprehensive metabolic panel prior to next visit  2   Hyperlipidemia:  His LDL cholesterol value and total cholesterol continue to be elevated  Discussed the benefits, risks and side effects of Crestor  I have asked him to start Crestor 5 mg daily  He will contact the office with any side effects  CC:  Type 1 Diabetes follow-up    History of Present Illness     HPI:  23 y  o  year old male with type 1 diabetes diagnosed approximately 6 months ago   He was admitted to the hospital in early November in diabetic ketoacidosis and was found to have type 1 diabetes   His most recent hemoglobin A1c from  August 1, 2020 is 7 1   He is currently utilizing Novolog 5 at meals and Basaglar 20 units at bedtime  He denies chest pain or shortness of breath   He obtained a diabetic eye exam on March 20, 2019 which showed no retinopathy  His most recent diabetic foot exam was performed on March 21, 2019   He denies neuropathy, nephropathy, retinopathy, heart attack, stroke and claudication but does admit to none       Hypoglycemic episodes: Rare      Blood Sugar/Glucometer/Pump/CGM review:  Download of his dex com continuous glucose monitor from July 31 through August 13, 2020 reveals an average glucose of 145 with a standard deviation of 45  Review of Systems   Constitutional: Negative  Negative for chills, fatigue and fever  HENT: Negative  Negative for trouble swallowing and voice change  Eyes: Negative for photophobia, pain, discharge, redness, itching and visual disturbance  Respiratory: Negative for cough and shortness of breath  Cardiovascular: Negative for chest pain and palpitations  Gastrointestinal: Negative for abdominal pain, constipation, diarrhea, nausea and vomiting  Endocrine: Negative for cold intolerance, heat intolerance, polydipsia, polyphagia and polyuria  Genitourinary: Negative  Musculoskeletal: Negative  Skin: Negative  Allergic/Immunologic: Negative  Neurological: Negative for dizziness, syncope, light-headedness and headaches  Hematological: Negative  Psychiatric/Behavioral: Negative  All other systems reviewed and are negative  Historical Information   Past Medical History:   Diagnosis Date    Fracture of phalanx of toe     Resolved 2/1/2016      No past surgical history on file    Social History   Social History     Substance and Sexual Activity   Alcohol Use No     Social History     Substance and Sexual Activity   Drug Use No     Social History     Tobacco Use   Smoking Status Never Smoker   Smokeless Tobacco Never Used     Family History:   Family History   Problem Relation Age of Onset    Diabetes Family     No Known Problems Mother     No Known Problems Father     No Known Problems Sister     No Known Problems Brother     No Known Problems Brother     No Known Problems Brother     No Known Problems Sister        Meds/Allergies   Current Outpatient Medications   Medication Sig Dispense Refill    acetone, urine, test strip 1 strip by Does not apply route as needed for high blood sugar 25 each 4    Continuous Blood Gluc  (Larissa Mcmahon) SHAYNA Use  to scan blood sugars daily  1 Device 0    Continuous Blood Gluc Sensor (DEXCOM G6 SENSOR) MISC Change sensor every 10 days  3 each 11    Continuous Blood Gluc Transmit (DEXCOM G6 TRANSMITTER) MISC Change transmitter every 90 days  1 each 3    FREESTYLE LITE test strip Test blood sugars 4 times daily 400 each 3    glucagon (GLUCAGON EMERGENCY) 1 MG injection Inject 1 mg under the skin once as needed for low blood sugar for up to 1 dose 1 each 5    insulin aspart (NOVOLOG FLEXPEN) 100 Units/mL injection pen Inject 8 Units under the skin 3 (three) times a day with meals (Patient taking differently: Inject 5 Units under the skin 3 (three) times a day with meals ) 5 pen 3    insulin glargine (Basaglar KwikPen) 100 units/mL injection pen Inject 20 Units under the skin daily 5 pen 5    Insulin Pen Needle 32G X 4 MM MISC USE WITH INSULIN UP TO 5 TIMES DAILY 500 each 3     No current facility-administered medications for this visit  No Known Allergies    Objective   Vitals: Blood pressure 126/80, pulse 80, height 5' 11" (1 803 m), weight 79 7 kg (175 lb 12 8 oz)  Physical Exam  Vitals signs reviewed  Constitutional:       Appearance: He is well-developed  HENT:      Head: Normocephalic and atraumatic  Nose: Nose normal    Eyes:      Conjunctiva/sclera: Conjunctivae normal       Pupils: Pupils are equal, round, and reactive to light  Neck:      Musculoskeletal: Normal range of motion and neck supple  Cardiovascular:      Rate and Rhythm: Normal rate and regular rhythm  Pulses: no weak pulses          Dorsalis pedis pulses are 2+ on the right side and 2+ on the left side  Posterior tibial pulses are 2+ on the right side and 2+ on the left side  Heart sounds: Normal heart sounds  Pulmonary:      Effort: Pulmonary effort is normal       Breath sounds: Normal breath sounds     Abdominal:      General: Bowel sounds are normal       Palpations: Abdomen is soft  Musculoskeletal: Normal range of motion  Feet:      Right foot:      Skin integrity: No ulcer, skin breakdown, erythema, warmth, callus or dry skin  Left foot:      Skin integrity: No ulcer, skin breakdown, erythema, warmth, callus or dry skin  Skin:     General: Skin is warm and dry  Neurological:      Mental Status: He is alert and oriented to person, place, and time  Psychiatric:         Behavior: Behavior normal          Thought Content: Thought content normal          Judgment: Judgment normal        Patient's shoes and socks removed  Right Foot/Ankle   Right Foot Inspection  Skin Exam: skin normal and skin intact no dry skin, no warmth, no callus, no erythema, no maceration, no abnormal color, no pre-ulcer, no ulcer and no callus                          Toe Exam: ROM and strength within normal limits  Sensory       Monofilament testing: intact  Vascular  Capillary refills: < 3 seconds  The right DP pulse is 2+  The right PT pulse is 2+  Left Foot/Ankle  Left Foot Inspection  Skin Exam: skin normal and skin intactno dry skin, no warmth, no erythema, no maceration, normal color, no pre-ulcer, no ulcer and no callus                         Toe Exam: ROM and strength within normal limits                   Sensory       Monofilament: intact  Vascular  Capillary refills: < 3 seconds  The left DP pulse is 2+  The left PT pulse is 2+  Assign Risk Category:  No deformity present; No loss of protective sensation;  No weak pulses       Risk: 0        Lab Results:   Lab Results   Component Value Date/Time    Hemoglobin A1C 7 1 (H) 08/01/2020 11:01 AM    Hemoglobin A1C 7 4 (H) 04/28/2020 07:28 AM    Hemoglobin A1C 8 6 (H) 02/12/2020 07:24 AM    BUN 31 (H) 04/28/2020 07:28 AM    BUN 21 02/12/2020 07:24 AM    BUN 21 10/15/2019 07:20 AM    Potassium 4 5 04/28/2020 07:28 AM    Potassium 4 4 02/12/2020 07:24 AM    Potassium 4 2 10/15/2019 07:20 AM    Chloride 99 04/28/2020 07:28 AM    Chloride 102 02/12/2020 07:24 AM    Chloride 100 10/15/2019 07:20 AM    CO2 24 04/28/2020 07:28 AM    CO2 29 02/12/2020 07:24 AM    CO2 29 10/15/2019 07:20 AM    Creatinine 1 03 04/28/2020 07:28 AM    Creatinine 1 04 02/12/2020 07:24 AM    Creatinine 1 13 10/15/2019 07:20 AM    AST 18 04/28/2020 07:28 AM    AST 16 02/12/2020 07:24 AM    AST 17 10/15/2019 07:20 AM    ALT 19 04/28/2020 07:28 AM    ALT 17 02/12/2020 07:24 AM    ALT 18 10/15/2019 07:20 AM    Albumin 4 7 04/28/2020 07:28 AM    Albumin 4 4 02/12/2020 07:24 AM    Albumin 4 5 10/15/2019 07:20 AM    Globulin 1 7 (L) 02/12/2020 07:24 AM    Globulin 2 0 10/15/2019 07:20 AM    Globulin, Total 1 9 04/28/2020 07:28 AM    HDL 63 08/01/2020 11:01 AM    HDL 70 04/28/2020 07:28 AM    HDL 46 10/15/2019 07:20 AM    Triglycerides 112 08/01/2020 11:01 AM    Triglycerides 89 04/28/2020 07:28 AM    Triglycerides 125 10/15/2019 07:20 AM     Portions of the record may have been created with voice recognition software  Occasional wrong word or "sound a like" substitutions may have occurred due to the inherent limitations of voice recognition software  Read the chart carefully and recognize, using context, where substitutions have occurred

## 2020-08-13 NOTE — PATIENT INSTRUCTIONS
Be mindful of diet      Stay active and stay hydrated      For now, continue Francesco Kristy at current dose      Continue Humalog at units at breakfast but increase to 6 units at lunch and dinner, as discussed      Continue to utilize the DEX COM and send a record to the office in 2 weeks for review      Obtain lab work as prescribed   We will contact you with the results  Obtain a diabetic eye exam     Your LDL cholesterol continues to be elevated  Consider starting Crestor 5 mg daily, as discussed

## 2020-08-14 ENCOUNTER — TELEPHONE (OUTPATIENT)
Dept: ENDOCRINOLOGY | Facility: HOSPITAL | Age: 20
End: 2020-08-14

## 2020-10-01 DIAGNOSIS — E10.9 DM W/O COMPLICATION TYPE I (HCC): ICD-10-CM

## 2020-10-01 RX ORDER — INSULIN GLARGINE 100 [IU]/ML
INJECTION, SOLUTION SUBCUTANEOUS
Qty: 5 PEN | Refills: 0 | Status: SHIPPED | OUTPATIENT
Start: 2020-10-01 | End: 2021-12-28 | Stop reason: SDUPTHER

## 2020-11-21 LAB
ALBUMIN SERPL-MCNC: 4.5 G/DL (ref 3.6–5.1)
ALBUMIN/GLOB SERPL: 2.1 (CALC) (ref 1–2.5)
ALP SERPL-CCNC: 71 U/L (ref 36–130)
ALT SERPL-CCNC: 13 U/L (ref 9–46)
AST SERPL-CCNC: 14 U/L (ref 10–40)
BILIRUB SERPL-MCNC: 0.6 MG/DL (ref 0.2–1.2)
BUN SERPL-MCNC: 26 MG/DL (ref 7–25)
BUN/CREAT SERPL: 23 (CALC) (ref 6–22)
CALCIUM SERPL-MCNC: 9.6 MG/DL (ref 8.6–10.3)
CHLORIDE SERPL-SCNC: 105 MMOL/L (ref 98–110)
CHOLEST SERPL-MCNC: 244 MG/DL
CHOLEST/HDLC SERPL: 4 (CALC)
CO2 SERPL-SCNC: 29 MMOL/L (ref 20–32)
CREAT SERPL-MCNC: 1.11 MG/DL (ref 0.6–1.35)
EST. AVERAGE GLUCOSE BLD GHB EST-MCNC: 151 (CALC)
EST. AVERAGE GLUCOSE BLD GHB EST-SCNC: 8.4 (CALC)
GLOBULIN SER CALC-MCNC: 2.1 G/DL (CALC) (ref 1.9–3.7)
GLUCOSE SERPL-MCNC: 141 MG/DL (ref 65–99)
HBA1C MFR BLD: 6.9 % OF TOTAL HGB
HDLC SERPL-MCNC: 61 MG/DL
LDLC SERPL CALC-MCNC: 164 MG/DL (CALC)
NONHDLC SERPL-MCNC: 183 MG/DL (CALC)
POTASSIUM SERPL-SCNC: 4.6 MMOL/L (ref 3.5–5.3)
PROT SERPL-MCNC: 6.6 G/DL (ref 6.1–8.1)
SL AMB EGFR AFRICAN AMERICAN: 109 ML/MIN/1.73M2
SL AMB EGFR NON AFRICAN AMERICAN: 94 ML/MIN/1.73M2
SODIUM SERPL-SCNC: 139 MMOL/L (ref 135–146)
TRIGL SERPL-MCNC: 82 MG/DL

## 2020-12-10 ENCOUNTER — OFFICE VISIT (OUTPATIENT)
Dept: ENDOCRINOLOGY | Facility: HOSPITAL | Age: 20
End: 2020-12-10
Payer: COMMERCIAL

## 2020-12-10 VITALS
WEIGHT: 177.8 LBS | HEIGHT: 71 IN | DIASTOLIC BLOOD PRESSURE: 68 MMHG | BODY MASS INDEX: 24.89 KG/M2 | HEART RATE: 80 BPM | SYSTOLIC BLOOD PRESSURE: 120 MMHG

## 2020-12-10 DIAGNOSIS — E10.65 TYPE 1 DIABETES MELLITUS WITH HYPERGLYCEMIA (HCC): Primary | ICD-10-CM

## 2020-12-10 DIAGNOSIS — E10.9 TYPE 1 DIABETES MELLITUS WITHOUT COMPLICATION (HCC): ICD-10-CM

## 2020-12-10 DIAGNOSIS — E78.41 ELEVATED LIPOPROTEIN(A): ICD-10-CM

## 2020-12-10 PROCEDURE — 95251 CONT GLUC MNTR ANALYSIS I&R: CPT | Performed by: NURSE PRACTITIONER

## 2020-12-10 PROCEDURE — 1036F TOBACCO NON-USER: CPT | Performed by: NURSE PRACTITIONER

## 2020-12-10 PROCEDURE — 99214 OFFICE O/P EST MOD 30 MIN: CPT | Performed by: NURSE PRACTITIONER

## 2020-12-10 PROCEDURE — 3008F BODY MASS INDEX DOCD: CPT | Performed by: NURSE PRACTITIONER

## 2020-12-10 RX ORDER — BLOOD-GLUCOSE SENSOR
EACH MISCELLANEOUS
Qty: 3 EACH | Refills: 11 | Status: SHIPPED | OUTPATIENT
Start: 2020-12-10 | End: 2021-12-28

## 2021-02-25 ENCOUNTER — DOCUMENTATION (OUTPATIENT)
Dept: ENDOCRINOLOGY | Facility: HOSPITAL | Age: 21
End: 2021-02-25

## 2021-02-25 NOTE — PROGRESS NOTES
[employer] Insulin-Treated Diabetes Assessment Form  Faxed completed form to [phone]

## 2021-03-10 DIAGNOSIS — E10.9 TYPE 1 DIABETES MELLITUS WITHOUT COMPLICATION (HCC): ICD-10-CM

## 2021-03-11 RX ORDER — BLOOD-GLUCOSE TRANSMITTER
EACH MISCELLANEOUS
Qty: 1 EACH | Refills: 3 | Status: SHIPPED | OUTPATIENT
Start: 2021-03-11 | End: 2021-11-05 | Stop reason: SDUPTHER

## 2021-03-19 LAB
ALBUMIN SERPL-MCNC: 4.5 G/DL (ref 3.6–5.1)
ALBUMIN/GLOB SERPL: 2.4 (CALC) (ref 1–2.5)
ALP SERPL-CCNC: 74 U/L (ref 36–130)
ALT SERPL-CCNC: 19 U/L (ref 9–46)
AST SERPL-CCNC: 17 U/L (ref 10–40)
BILIRUB SERPL-MCNC: 0.6 MG/DL (ref 0.2–1.2)
BUN SERPL-MCNC: 21 MG/DL (ref 7–25)
BUN/CREAT SERPL: ABNORMAL (CALC) (ref 6–22)
CALCIUM SERPL-MCNC: 9.3 MG/DL (ref 8.6–10.3)
CHLORIDE SERPL-SCNC: 103 MMOL/L (ref 98–110)
CHOLEST SERPL-MCNC: 214 MG/DL
CHOLEST/HDLC SERPL: 4.3 (CALC)
CO2 SERPL-SCNC: 29 MMOL/L (ref 20–32)
CREAT SERPL-MCNC: 1 MG/DL (ref 0.6–1.35)
EST. AVERAGE GLUCOSE BLD GHB EST-MCNC: 157 (CALC)
EST. AVERAGE GLUCOSE BLD GHB EST-SCNC: 8.7 (CALC)
GLOBULIN SER CALC-MCNC: 1.9 G/DL (CALC) (ref 1.9–3.7)
GLUCOSE SERPL-MCNC: 101 MG/DL (ref 65–99)
HBA1C MFR BLD: 7.1 % OF TOTAL HGB
HDLC SERPL-MCNC: 50 MG/DL
LDLC SERPL CALC-MCNC: 145 MG/DL (CALC)
NONHDLC SERPL-MCNC: 164 MG/DL (CALC)
POTASSIUM SERPL-SCNC: 4.1 MMOL/L (ref 3.5–5.3)
PROT SERPL-MCNC: 6.4 G/DL (ref 6.1–8.1)
SL AMB EGFR AFRICAN AMERICAN: 123 ML/MIN/1.73M2
SL AMB EGFR NON AFRICAN AMERICAN: 106 ML/MIN/1.73M2
SODIUM SERPL-SCNC: 140 MMOL/L (ref 135–146)
TRIGL SERPL-MCNC: 84 MG/DL
TSH SERPL-ACNC: 1.63 MIU/L (ref 0.4–4.5)

## 2021-03-19 PROCEDURE — 3051F HG A1C>EQUAL 7.0%<8.0%: CPT | Performed by: NURSE PRACTITIONER

## 2021-03-31 ENCOUNTER — OFFICE VISIT (OUTPATIENT)
Dept: ENDOCRINOLOGY | Facility: HOSPITAL | Age: 21
End: 2021-03-31
Payer: COMMERCIAL

## 2021-03-31 VITALS
SYSTOLIC BLOOD PRESSURE: 110 MMHG | DIASTOLIC BLOOD PRESSURE: 64 MMHG | WEIGHT: 179 LBS | HEART RATE: 70 BPM | BODY MASS INDEX: 25.06 KG/M2 | HEIGHT: 71 IN

## 2021-03-31 DIAGNOSIS — E10.65 TYPE 1 DIABETES MELLITUS WITH HYPERGLYCEMIA (HCC): Primary | ICD-10-CM

## 2021-03-31 DIAGNOSIS — E78.41 ELEVATED LIPOPROTEIN(A): ICD-10-CM

## 2021-03-31 PROCEDURE — 95251 CONT GLUC MNTR ANALYSIS I&R: CPT | Performed by: NURSE PRACTITIONER

## 2021-03-31 PROCEDURE — 3008F BODY MASS INDEX DOCD: CPT | Performed by: NURSE PRACTITIONER

## 2021-03-31 PROCEDURE — 1036F TOBACCO NON-USER: CPT | Performed by: NURSE PRACTITIONER

## 2021-03-31 PROCEDURE — 99214 OFFICE O/P EST MOD 30 MIN: CPT | Performed by: NURSE PRACTITIONER

## 2021-03-31 NOTE — PROGRESS NOTES
Brandon Davison 23 y o  male MRN: [id number]    Encounter: [id number]      Assessment/Plan     Assessment: This is a 23y o -year-old male with type 1 diabetes  Plan:  1  Type 1 diabetes:  His most recent hemoglobin A1c has improved to 7  1   Review of his dex com download reveals some hyperglycemia starting in the later afternoon into the evening after dinner  [facility] is also seeing a rise in his blood sugars after lunch   I have asked him to continue NovoLog 6 units with breakfast and lunch but increase to 8 units with dinner  [facility] will continue Basaglar at current dose   He will continue to utilize the dex com continuous glucose monitor and send a download to the office for 2 weeks for review and further adjustment, if necessary    Check hemoglobin A1c and comprehensive metabolic panel prior to next visit      2   Hyperlipidemia:  His LDL cholesterol value and total cholesterol are improved but remain elevated  He will continue positive lifestyle changes with regard to his diet  Will continue to monitor  CC:  Type 1 Diabetes follow-up    History of Present Illness     HPI:  20 y  o  year old male with type 1 diabetes diagnosed approximately 6 months ago   He was admitted to the hospital in early November in diabetic ketoacidosis and was found to have type 1 diabetes   His most recent hemoglobin A1c from  March 18, 2021 is 7 1   He is currently utilizing Novolog 5 at breakfast and 6 units at lunch and dinner with Basaglar 20 units at bedtime   He denies chest pain or shortness of breath   He obtained a diabetic eye exam on March 20, 2019 which showed no retinopathy   His most recent diabetic foot exam was performed on August 13, 2020  He denies neuropathy, nephropathy, retinopathy, heart attack, stroke and claudication but does admit to none       Hypoglycemic episodes: Rare      Blood Sugar/Glucometer/Pump/CGM review:  Download of his dex com continuous glucose monitor from to March 18 through March 31, 2021 reveals an average glucose of 165 with a standard deviation of 63   He is generally well controlled throughout the day with some hyperglycemia after dinner      LDL cholesterol has improved slightly to 145 with a total cholesterol of 214  Review of Systems   Constitutional: Negative  Negative for chills, fatigue and fever  HENT: Negative  Negative for trouble swallowing and voice change  Eyes: Negative for photophobia, pain, discharge, redness, itching and visual disturbance  Respiratory: Negative for cough and shortness of breath  Cardiovascular: Negative for chest pain and palpitations  Gastrointestinal: Negative for abdominal pain, constipation, diarrhea, nausea and vomiting  Endocrine: Negative for cold intolerance, heat intolerance, polydipsia, polyphagia and polyuria  Genitourinary: Negative  Musculoskeletal: Negative  Skin: Negative  Allergic/Immunologic: Negative  Neurological: Positive for headaches  Negative for dizziness, syncope and light-headedness  Hematological: Negative  Psychiatric/Behavioral: Negative  All other systems reviewed and are negative  Historical Information   Past Medical History:   Diagnosis Date    Fracture of phalanx of toe     Resolved 2/1/2016      No past surgical history on file    Social History   Social History     Substance and Sexual Activity   Alcohol Use No     Social History     Substance and Sexual Activity   Drug Use No     Social History     Tobacco Use   Smoking Status Never Smoker   Smokeless Tobacco Never Used     Family History:   Family History   Problem Relation Age of Onset    Diabetes Family     No Known Problems Mother     No Known Problems Father     No Known Problems Sister     No Known Problems Brother     No Known Problems Brother     No Known Problems Brother     No Known Problems Sister        Meds/Allergies   Current Outpatient Medications   Medication Sig Dispense Refill    acetone, urine, test strip 1 strip by Does not apply route as needed for high blood sugar 25 each 4    Basaglar KwikPen 100 units/mL injection pen INJECT 20 UNITS UNDER THE SKIN DAILY 5 pen 0    Continuous Blood Gluc  (DEXCOM G6 ) SHAYNA Use  to scan blood sugars daily  1 Device 0    Continuous Blood Gluc Sensor (Dexcom G6 Sensor) MISC Change sensor every 10 days  3 each 11    Continuous Blood Gluc Transmit (Dexcom G6 Transmitter) MISC CHANGE TRANSMITTER EVERY 90 DAYS  1 each 3    FREESTYLE LITE test strip Test blood sugars 4 times daily 400 each 3    glucagon (GLUCAGON EMERGENCY) 1 MG injection Inject 1 mg under the skin once as needed for low blood sugar for up to 1 dose 1 each 5    insulin aspart (NovoLOG) 100 Units/mL injection pen Take 5 units with breakfast and lunch and 6 units with dinner (Patient taking differently: Take 6 units with breakfast and lunch and 7 units with dinner) 5 pen 6    Insulin Pen Needle 32G X 4 MM MISC USE WITH INSULIN UP TO 5 TIMES DAILY 500 each 3    rosuvastatin (CRESTOR) 5 mg tablet Take 1 tablet (5 mg total) by mouth daily (Patient not taking: Reported on 12/10/2020) 90 tablet 3     No current facility-administered medications for this visit  No Known Allergies    Objective   Vitals: Blood pressure 110/64, pulse 70, height 5' 11" (1 803 m), weight 81 2 kg (179 lb)  Physical Exam  Vitals signs reviewed  Constitutional:       Appearance: He is well-developed  HENT:      Head: Normocephalic and atraumatic  Nose: Nose normal    Eyes:      Conjunctiva/sclera: Conjunctivae normal       Pupils: Pupils are equal, round, and reactive to light  Neck:      Musculoskeletal: Normal range of motion and neck supple  Cardiovascular:      Rate and Rhythm: Normal rate and regular rhythm  Heart sounds: Normal heart sounds  Pulmonary:      Effort: Pulmonary effort is normal       Breath sounds: Normal breath sounds     Abdominal:      General: Bowel sounds are normal       Palpations: Abdomen is soft  Musculoskeletal: Normal range of motion  Skin:     General: Skin is warm and dry  Neurological:      Mental Status: He is alert and oriented to person, place, and time  Psychiatric:         Behavior: Behavior normal          Thought Content: Thought content normal          Judgment: Judgment normal      Lab Results:   Lab Results   Component Value Date/Time    Hemoglobin A1C 7 1 (H) 03/18/2021 07:22 AM    Hemoglobin A1C 6 9 (H) 11/20/2020 12:23 PM    Hemoglobin A1C 7 1 (H) 08/01/2020 11:01 AM    BUN 21 03/18/2021 07:22 AM    BUN 26 (H) 11/20/2020 12:23 PM    BUN 31 (H) 04/28/2020 07:28 AM    Potassium 4 1 03/18/2021 07:22 AM    Potassium 4 6 11/20/2020 12:23 PM    Potassium 4 5 04/28/2020 07:28 AM    Chloride 103 03/18/2021 07:22 AM    Chloride 105 11/20/2020 12:23 PM    Chloride 99 04/28/2020 07:28 AM    CO2 29 03/18/2021 07:22 AM    CO2 29 11/20/2020 12:23 PM    CO2 24 04/28/2020 07:28 AM    Creatinine 1 00 03/18/2021 07:22 AM    Creatinine 1 11 11/20/2020 12:23 PM    Creatinine 1 03 04/28/2020 07:28 AM    AST 17 03/18/2021 07:22 AM    AST 14 11/20/2020 12:23 PM    AST 18 04/28/2020 07:28 AM    ALT 19 03/18/2021 07:22 AM    ALT 13 11/20/2020 12:23 PM    ALT 19 04/28/2020 07:28 AM    Albumin 4 5 03/18/2021 07:22 AM    Albumin 4 5 11/20/2020 12:23 PM    Albumin 4 7 04/28/2020 07:28 AM    Globulin 1 9 03/18/2021 07:22 AM    Globulin 2 1 11/20/2020 12:23 PM    Globulin, Total 1 9 04/28/2020 07:28 AM    HDL 50 03/18/2021 07:22 AM    HDL 61 11/20/2020 12:23 PM    HDL 63 08/01/2020 11:01 AM    Triglycerides 84 03/18/2021 07:22 AM    Triglycerides 82 11/20/2020 12:23 PM    Triglycerides 112 08/01/2020 11:01 AM     Portions of the record may have been created with voice recognition software  Occasional wrong word or "sound a like" substitutions may have occurred due to the inherent limitations of voice recognition software   Read the chart carefully and recognize, using context, where substitutions have occurred

## 2021-03-31 NOTE — PATIENT INSTRUCTIONS
Be mindful of diet      Stay active and stay hydrated with water      For now, continue Basaglar at current dose      Continue Humalog at 6 units at breakfast and lunch with a slightly increase to 8 units dinner, as discussed      Continue to utilize the DEX COM and send a record to the office in 2 weeks for review      Obtain lab work as prescribed   We will contact you with the results      Obtain a diabetic eye exam

## 2021-07-03 LAB
ALBUMIN SERPL-MCNC: 4.5 G/DL (ref 3.6–5.1)
ALBUMIN/CREAT UR: 61 MCG/MG CREAT
ALBUMIN/GLOB SERPL: 2.1 (CALC) (ref 1–2.5)
ALP SERPL-CCNC: 86 U/L (ref 36–130)
ALT SERPL-CCNC: 15 U/L (ref 9–46)
AST SERPL-CCNC: 16 U/L (ref 10–40)
BILIRUB SERPL-MCNC: 0.6 MG/DL (ref 0.2–1.2)
BUN SERPL-MCNC: 32 MG/DL (ref 7–25)
BUN/CREAT SERPL: 27 (CALC) (ref 6–22)
CALCIUM SERPL-MCNC: 9.7 MG/DL (ref 8.6–10.3)
CHLORIDE SERPL-SCNC: 103 MMOL/L (ref 98–110)
CO2 SERPL-SCNC: 27 MMOL/L (ref 20–32)
CREAT SERPL-MCNC: 1.2 MG/DL (ref 0.6–1.35)
CREAT UR-MCNC: 230 MG/DL (ref 20–320)
EST. AVERAGE GLUCOSE BLD GHB EST-MCNC: 160 (CALC)
EST. AVERAGE GLUCOSE BLD GHB EST-SCNC: 8.9 (CALC)
GLOBULIN SER CALC-MCNC: 2.1 G/DL (CALC) (ref 1.9–3.7)
GLUCOSE SERPL-MCNC: 129 MG/DL (ref 65–99)
HBA1C MFR BLD: 7.2 % OF TOTAL HGB
MICROALBUMIN UR-MCNC: 14.1 MG/DL
POTASSIUM SERPL-SCNC: 4.2 MMOL/L (ref 3.5–5.3)
PROT SERPL-MCNC: 6.6 G/DL (ref 6.1–8.1)
SL AMB EGFR AFRICAN AMERICAN: 98 ML/MIN/1.73M2
SL AMB EGFR NON AFRICAN AMERICAN: 85 ML/MIN/1.73M2
SODIUM SERPL-SCNC: 138 MMOL/L (ref 135–146)

## 2021-07-03 PROCEDURE — 3051F HG A1C>EQUAL 7.0%<8.0%: CPT | Performed by: NURSE PRACTITIONER

## 2021-07-03 PROCEDURE — 3060F POS MICROALBUMINURIA REV: CPT | Performed by: NURSE PRACTITIONER

## 2021-07-08 ENCOUNTER — TELEMEDICINE (OUTPATIENT)
Dept: ENDOCRINOLOGY | Facility: HOSPITAL | Age: 21
End: 2021-07-08
Payer: COMMERCIAL

## 2021-07-08 DIAGNOSIS — E10.9 TYPE 1 DIABETES MELLITUS WITHOUT COMPLICATION (HCC): ICD-10-CM

## 2021-07-08 PROCEDURE — 1036F TOBACCO NON-USER: CPT | Performed by: NURSE PRACTITIONER

## 2021-07-08 PROCEDURE — 99214 OFFICE O/P EST MOD 30 MIN: CPT | Performed by: NURSE PRACTITIONER

## 2021-07-08 NOTE — PATIENT INSTRUCTIONS
Be mindful of diet  You were dehydrated on your blood work  Please stay hydrated with water  Your hemoglobin A1c is 7 2    Goal for you is below 7 0      For now, continue Basaglar at current dose      Continue Humalog at 8 units at meals      Continue to utilize the DEX COM and send a record to the office in 2 weeks for review      Obtain lab work as prescribed        Obtain a diabetic eye exam

## 2021-07-08 NOTE — PROGRESS NOTES
Virtual Regular Visit      Problem List Items Addressed This Visit     None        Reason for visit is  type 1 diabetes  Encounter provider RADHA Denny    Provider located at [address] [place] [address] [address] [place] [address]      Recent Visits  No visits were found meeting these conditions  Showing recent visits within past 7 days and meeting all other requirements  Future Appointments  No visits were found meeting these conditions  Showing future appointments within next 150 days and meeting all other requirements       The patient was identified by name and date of birth  Anne Veea was informed that this is a telemedicine visit and that the visit is being conducted through telephone  My office door was closed  No one else was in the room  He acknowledged consent and understanding of privacy and security of the video platform  The patient has agreed to participate and understands they can discontinue the visit at any time  Patient is aware this is a billable service  Iva Burk is a 19 y o  male with type 1 diabetes diagnosed approximately 6 months ago   He was admitted to the hospital in early November in diabetic ketoacidosis and was found to have type 1 diabetes   His most recent hemoglobin A1c from July 2, 2021 is 7 2   He is currently utilizing Novolog 8 units at meals with Basaglar 20 units at bedtime   He denies chest pain or shortness of breath   He obtained a diabetic eye exam on March 20, 2019 which showed no retinopathy   His most recent diabetic foot exam was performed on August 13, 2020  He denies neuropathy, nephropathy, retinopathy, heart attack, stroke and claudication but does admit to none       Hypoglycemic episodes: Rare      Blood Sugar/Glucometer/Pump/CGM review:  Download of his dex com continuous glucose monitor was not available for review at the time of the visit      HPI     Past Medical History:   Diagnosis Date    Fracture of phalanx of toe     Resolved 2/1/2016        History reviewed  No pertinent surgical history  Current Outpatient Medications   Medication Sig Dispense Refill    acetone, urine, test strip 1 strip by Does not apply route as needed for high blood sugar 25 each 4    Continuous Blood Gluc  (DEXCOM G6 ) SHAYNA Use  to scan blood sugars daily  1 Device 0    Continuous Blood Gluc Sensor (Dexcom G6 Sensor) MISC Change sensor every 10 days  3 each 11    Continuous Blood Gluc Transmit (Dexcom G6 Transmitter) MISC CHANGE TRANSMITTER EVERY 90 DAYS  1 each 3    FREESTYLE LITE test strip Test blood sugars 4 times daily 400 each 3    glucagon (GLUCAGON EMERGENCY) 1 MG injection Inject 1 mg under the skin once as needed for low blood sugar for up to 1 dose 1 each 5    insulin aspart (NovoLOG) 100 Units/mL injection pen Take 5 units with breakfast and lunch and 6 units with dinner (Patient taking differently: Inject 8 Units under the skin 3 (three) times a day with meals ) 5 pen 6    Insulin Pen Needle 32G X 4 MM MISC USE WITH INSULIN UP TO 5 TIMES DAILY 500 each 3    Basaglar KwikPen 100 units/mL injection pen INJECT 20 UNITS UNDER THE SKIN DAILY 5 pen 0    rosuvastatin (CRESTOR) 5 mg tablet Take 1 tablet (5 mg total) by mouth daily (Patient not taking: Reported on 12/10/2020) 90 tablet 3     No current facility-administered medications for this visit  No Known Allergies    Review of Systems   Constitutional: Negative  Negative for chills, fatigue and fever  HENT: Negative  Negative for trouble swallowing and voice change  Eyes: Negative for visual disturbance  Respiratory: Negative for cough and shortness of breath  Cardiovascular: Negative for chest pain and palpitations  Gastrointestinal: Negative for abdominal pain, constipation, diarrhea, nausea and vomiting     Endocrine: Negative for cold intolerance, heat intolerance, polydipsia, polyphagia and polyuria  Genitourinary: Negative  Musculoskeletal: Negative  Skin: Negative  Allergic/Immunologic: Negative  Neurological: Negative for dizziness, syncope, light-headedness and headaches  Hematological: Negative  Psychiatric/Behavioral: Negative  All other systems reviewed and are negative  Video Exam  It was my intent to perform this visit via video technology with [employer] but the patient was not able to do a video connection so the visit was completed via audio telephone only  There were no vitals filed for this visit  Physical Exam ( not available-phone visit )    Plan:  1  Type 1 diabetes:  His most recent hemoglobin A1c is 7 2  Download of his dex com was not able to be viewed at the time of the visit  For now, he will continue his current regimen  I have asked him to upload his Dexcom so that it can be reviewed so changes can be made to help his glycemic control throughout the day   Check hemoglobin A1c and comprehensive metabolic panel prior to next visit      2   Hyperlipidemia:  He will continue positive lifestyle changes with regard to his diet   Will continue to monitor with a fasting lipid panel prior to next office visit  I spent 30 minutes directly with the patient during this visit       [address] acknowledges that he has consented to an online visit or consultation  He understands that the online visit is based solely on information provided by him, and that, in the absence of a face-to-face physical evaluation by the physician, the diagnosis he receives is both limited and provisional in terms of accuracy and completeness  This is not intended to replace a full medical face-to-face evaluation by the physician  Ranulfo Pal understands and accepts these terms

## 2021-07-09 DIAGNOSIS — E10.65 TYPE 1 DIABETES MELLITUS WITH HYPERGLYCEMIA (HCC): Primary | ICD-10-CM

## 2021-07-09 DIAGNOSIS — E78.41 ELEVATED LIPOPROTEIN(A): ICD-10-CM

## 2021-09-23 DIAGNOSIS — E10.65 TYPE 1 DIABETES MELLITUS WITH HYPERGLYCEMIA (HCC): Primary | ICD-10-CM

## 2021-09-24 RX ORDER — LANCETS
EACH MISCELLANEOUS
Qty: 400 EACH | Refills: 2 | Status: SHIPPED | OUTPATIENT
Start: 2021-09-24

## 2021-09-24 RX ORDER — BLOOD SUGAR DIAGNOSTIC
STRIP MISCELLANEOUS
Qty: 400 EACH | Refills: 2 | Status: SHIPPED | OUTPATIENT
Start: 2021-09-24

## 2021-09-28 ENCOUNTER — TELEPHONE (OUTPATIENT)
Dept: OTHER | Facility: OTHER | Age: 21
End: 2021-09-28

## 2021-09-28 NOTE — TELEPHONE ENCOUNTER
Patient's mother called to confirm appointment's next appointment date and time   Patient's mother informed next appointment is scheduled for 10/21/21 at 5:15pm

## 2021-10-04 DIAGNOSIS — E10.9 TYPE 1 DIABETES MELLITUS WITHOUT COMPLICATION (HCC): ICD-10-CM

## 2021-10-05 RX ORDER — PEN NEEDLE, DIABETIC 32GX 5/32"
NEEDLE, DISPOSABLE MISCELLANEOUS
Qty: 500 EACH | Refills: 3 | Status: SHIPPED | OUTPATIENT
Start: 2021-10-05

## 2021-10-15 LAB
ALBUMIN SERPL-MCNC: 4.7 G/DL (ref 3.6–5.1)
ALBUMIN/GLOB SERPL: 2.1 (CALC) (ref 1–2.5)
ALP SERPL-CCNC: 77 U/L (ref 36–130)
ALT SERPL-CCNC: 22 U/L (ref 9–46)
AST SERPL-CCNC: 34 U/L (ref 10–40)
BASOPHILS # BLD AUTO: 21 CELLS/UL (ref 0–200)
BASOPHILS NFR BLD AUTO: 0.3 %
BILIRUB SERPL-MCNC: 1 MG/DL (ref 0.2–1.2)
BUN SERPL-MCNC: 32 MG/DL (ref 7–25)
BUN/CREAT SERPL: 27 (CALC) (ref 6–22)
CALCIUM SERPL-MCNC: 9.6 MG/DL (ref 8.6–10.3)
CHLORIDE SERPL-SCNC: 101 MMOL/L (ref 98–110)
CHOLEST SERPL-MCNC: 243 MG/DL
CHOLEST/HDLC SERPL: 3.7 (CALC)
CO2 SERPL-SCNC: 27 MMOL/L (ref 20–32)
CREAT SERPL-MCNC: 1.18 MG/DL (ref 0.6–1.35)
EOSINOPHIL # BLD AUTO: 41 CELLS/UL (ref 15–500)
EOSINOPHIL NFR BLD AUTO: 0.6 %
ERYTHROCYTE [DISTWIDTH] IN BLOOD BY AUTOMATED COUNT: 12.3 % (ref 11–15)
EST. AVERAGE GLUCOSE BLD GHB EST-MCNC: 154 (CALC)
EST. AVERAGE GLUCOSE BLD GHB EST-SCNC: 8.5 (CALC)
GLOBULIN SER CALC-MCNC: 2.2 G/DL (CALC) (ref 1.9–3.7)
GLUCOSE SERPL-MCNC: 133 MG/DL (ref 65–99)
HBA1C MFR BLD: 7 % OF TOTAL HGB
HCT VFR BLD AUTO: 47.2 % (ref 38.5–50)
HDLC SERPL-MCNC: 66 MG/DL
HGB BLD-MCNC: 15.8 G/DL (ref 13.2–17.1)
LDLC SERPL CALC-MCNC: 154 MG/DL (CALC)
LYMPHOCYTES # BLD AUTO: 2008 CELLS/UL (ref 850–3900)
LYMPHOCYTES NFR BLD AUTO: 29.1 %
MCH RBC QN AUTO: 30.9 PG (ref 27–33)
MCHC RBC AUTO-ENTMCNC: 33.5 G/DL (ref 32–36)
MCV RBC AUTO: 92.2 FL (ref 80–100)
MONOCYTES # BLD AUTO: 490 CELLS/UL (ref 200–950)
MONOCYTES NFR BLD AUTO: 7.1 %
NEUTROPHILS # BLD AUTO: 4340 CELLS/UL (ref 1500–7800)
NEUTROPHILS NFR BLD AUTO: 62.9 %
NONHDLC SERPL-MCNC: 177 MG/DL (CALC)
PLATELET # BLD AUTO: 205 THOUSAND/UL (ref 140–400)
PMV BLD REES-ECKER: 11.3 FL (ref 7.5–12.5)
POTASSIUM SERPL-SCNC: 4.6 MMOL/L (ref 3.5–5.3)
PROT SERPL-MCNC: 6.9 G/DL (ref 6.1–8.1)
RBC # BLD AUTO: 5.12 MILLION/UL (ref 4.2–5.8)
SL AMB EGFR AFRICAN AMERICAN: 100 ML/MIN/1.73M2
SL AMB EGFR NON AFRICAN AMERICAN: 86 ML/MIN/1.73M2
SODIUM SERPL-SCNC: 136 MMOL/L (ref 135–146)
TRIGL SERPL-MCNC: 114 MG/DL
WBC # BLD AUTO: 6.9 THOUSAND/UL (ref 3.8–10.8)

## 2021-11-05 ENCOUNTER — OFFICE VISIT (OUTPATIENT)
Dept: ENDOCRINOLOGY | Facility: HOSPITAL | Age: 21
End: 2021-11-05
Payer: COMMERCIAL

## 2021-11-05 VITALS
WEIGHT: 183.8 LBS | DIASTOLIC BLOOD PRESSURE: 64 MMHG | HEIGHT: 71 IN | HEART RATE: 80 BPM | SYSTOLIC BLOOD PRESSURE: 100 MMHG | BODY MASS INDEX: 25.73 KG/M2

## 2021-11-05 DIAGNOSIS — E10.9 TYPE 1 DIABETES MELLITUS WITHOUT COMPLICATION (HCC): ICD-10-CM

## 2021-11-05 DIAGNOSIS — E10.65 TYPE 1 DIABETES MELLITUS WITH HYPERGLYCEMIA (HCC): Primary | ICD-10-CM

## 2021-11-05 PROCEDURE — 95251 CONT GLUC MNTR ANALYSIS I&R: CPT | Performed by: NURSE PRACTITIONER

## 2021-11-05 PROCEDURE — 99214 OFFICE O/P EST MOD 30 MIN: CPT | Performed by: NURSE PRACTITIONER

## 2021-11-05 RX ORDER — BLOOD-GLUCOSE TRANSMITTER
EACH MISCELLANEOUS
Qty: 1 EACH | Refills: 3 | Status: SHIPPED | OUTPATIENT
Start: 2021-11-05

## 2021-11-18 ENCOUNTER — TELEPHONE (OUTPATIENT)
Dept: DIABETES SERVICES | Facility: CLINIC | Age: 21
End: 2021-11-18

## 2021-11-19 ENCOUNTER — TELEPHONE (OUTPATIENT)
Dept: DIABETES SERVICES | Facility: CLINIC | Age: 21
End: 2021-11-19

## 2021-11-24 ENCOUNTER — TELEPHONE (OUTPATIENT)
Dept: DIABETES SERVICES | Facility: CLINIC | Age: 21
End: 2021-11-24

## 2021-12-17 ENCOUNTER — TELEMEDICINE (OUTPATIENT)
Dept: FAMILY MEDICINE CLINIC | Facility: CLINIC | Age: 21
End: 2021-12-17
Payer: COMMERCIAL

## 2021-12-17 VITALS — HEIGHT: 71 IN | BODY MASS INDEX: 25.62 KG/M2 | WEIGHT: 183 LBS

## 2021-12-17 DIAGNOSIS — J32.9 SINUSITIS, UNSPECIFIED CHRONICITY, UNSPECIFIED LOCATION: Primary | ICD-10-CM

## 2021-12-17 PROCEDURE — 3725F SCREEN DEPRESSION PERFORMED: CPT | Performed by: FAMILY MEDICINE

## 2021-12-17 PROCEDURE — 3008F BODY MASS INDEX DOCD: CPT | Performed by: FAMILY MEDICINE

## 2021-12-17 PROCEDURE — 99213 OFFICE O/P EST LOW 20 MIN: CPT | Performed by: FAMILY MEDICINE

## 2021-12-17 PROCEDURE — 1036F TOBACCO NON-USER: CPT | Performed by: FAMILY MEDICINE

## 2021-12-17 RX ORDER — AZITHROMYCIN 250 MG/1
TABLET, FILM COATED ORAL
Qty: 6 TABLET | Refills: 0 | Status: SHIPPED | OUTPATIENT
Start: 2021-12-17 | End: 2021-12-22

## 2021-12-27 DIAGNOSIS — E10.9 TYPE 1 DIABETES MELLITUS WITHOUT COMPLICATION (HCC): ICD-10-CM

## 2021-12-28 DIAGNOSIS — E10.9 DM W/O COMPLICATION TYPE I (HCC): ICD-10-CM

## 2021-12-28 RX ORDER — BLOOD-GLUCOSE SENSOR
EACH MISCELLANEOUS
Qty: 3 EACH | Refills: 10 | Status: SHIPPED | OUTPATIENT
Start: 2021-12-28

## 2021-12-28 RX ORDER — INSULIN GLARGINE 100 [IU]/ML
20 INJECTION, SOLUTION SUBCUTANEOUS DAILY
Qty: 15 ML | Refills: 1 | Status: SHIPPED | OUTPATIENT
Start: 2021-12-28 | End: 2022-03-28

## 2022-01-06 DIAGNOSIS — E10.9 TYPE 1 DIABETES MELLITUS WITHOUT COMPLICATION (HCC): ICD-10-CM

## 2022-01-07 RX ORDER — INSULIN ASPART 100 [IU]/ML
8 INJECTION, SOLUTION INTRAVENOUS; SUBCUTANEOUS
Qty: 15 ML | Refills: 6 | Status: SHIPPED | OUTPATIENT
Start: 2022-01-07 | End: 2022-06-15 | Stop reason: SDUPTHER

## 2022-01-13 ENCOUNTER — TELEPHONE (OUTPATIENT)
Dept: ENDOCRINOLOGY | Facility: HOSPITAL | Age: 22
End: 2022-01-13

## 2022-01-13 NOTE — TELEPHONE ENCOUNTER
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

## 2022-01-13 NOTE — TELEPHONE ENCOUNTER
The form requires an A1c within the last 3 months  Last one was 10/15/21  I advised Sunday Arianne that he must get his blood work done 1/15 or after before we can sign the form  The rest of it is completed and ready for Moustapha's signature

## 2022-01-18 NOTE — TELEPHONE ENCOUNTER
I'm waiting on patient to get the blood work done that the form requires then it's ready  Nallely Hardy signed the form in the mean time

## 2022-01-20 LAB
ALBUMIN SERPL-MCNC: 4.5 G/DL (ref 3.6–5.1)
ALBUMIN/GLOB SERPL: 2.4 (CALC) (ref 1–2.5)
ALP SERPL-CCNC: 62 U/L (ref 36–130)
ALT SERPL-CCNC: 13 U/L (ref 9–46)
AST SERPL-CCNC: 17 U/L (ref 10–40)
BASOPHILS # BLD AUTO: 9 CELLS/UL (ref 0–200)
BASOPHILS NFR BLD AUTO: 0.3 %
BILIRUB SERPL-MCNC: 0.7 MG/DL (ref 0.2–1.2)
BUN SERPL-MCNC: 23 MG/DL (ref 7–25)
BUN/CREAT SERPL: ABNORMAL (CALC) (ref 6–22)
CALCIUM SERPL-MCNC: 9.3 MG/DL (ref 8.6–10.3)
CHLORIDE SERPL-SCNC: 104 MMOL/L (ref 98–110)
CO2 SERPL-SCNC: 30 MMOL/L (ref 20–32)
CREAT SERPL-MCNC: 1.04 MG/DL (ref 0.6–1.35)
EOSINOPHIL # BLD AUTO: 39 CELLS/UL (ref 15–500)
EOSINOPHIL NFR BLD AUTO: 1.3 %
ERYTHROCYTE [DISTWIDTH] IN BLOOD BY AUTOMATED COUNT: 11.8 % (ref 11–15)
EST. AVERAGE GLUCOSE BLD GHB EST-MCNC: 194 MG/DL
EST. AVERAGE GLUCOSE BLD GHB EST-SCNC: 10.8 MMOL/L
GLOBULIN SER CALC-MCNC: 1.9 G/DL (CALC) (ref 1.9–3.7)
GLUCOSE SERPL-MCNC: 152 MG/DL (ref 65–99)
HBA1C MFR BLD: 8.4 % OF TOTAL HGB
HCT VFR BLD AUTO: 45.4 % (ref 38.5–50)
HGB BLD-MCNC: 15.4 G/DL (ref 13.2–17.1)
LYMPHOCYTES # BLD AUTO: 1620 CELLS/UL (ref 850–3900)
LYMPHOCYTES NFR BLD AUTO: 54 %
MCH RBC QN AUTO: 30.1 PG (ref 27–33)
MCHC RBC AUTO-ENTMCNC: 33.9 G/DL (ref 32–36)
MCV RBC AUTO: 88.8 FL (ref 80–100)
MONOCYTES # BLD AUTO: 384 CELLS/UL (ref 200–950)
MONOCYTES NFR BLD AUTO: 12.8 %
NEUTROPHILS # BLD AUTO: 948 CELLS/UL (ref 1500–7800)
NEUTROPHILS NFR BLD AUTO: 31.6 %
PLATELET # BLD AUTO: 168 THOUSAND/UL (ref 140–400)
PMV BLD REES-ECKER: 11 FL (ref 7.5–12.5)
POTASSIUM SERPL-SCNC: 4.4 MMOL/L (ref 3.5–5.3)
PROT SERPL-MCNC: 6.4 G/DL (ref 6.1–8.1)
RBC # BLD AUTO: 5.11 MILLION/UL (ref 4.2–5.8)
SL AMB EGFR AFRICAN AMERICAN: 117 ML/MIN/1.73M2
SL AMB EGFR NON AFRICAN AMERICAN: 101 ML/MIN/1.73M2
SODIUM SERPL-SCNC: 140 MMOL/L (ref 135–146)
TSH SERPL-ACNC: 1.51 MIU/L (ref 0.4–4.5)
WBC # BLD AUTO: 3 THOUSAND/UL (ref 3.8–10.8)

## 2022-01-20 PROCEDURE — 3052F HG A1C>EQUAL 8.0%<EQUAL 9.0%: CPT | Performed by: FAMILY MEDICINE

## 2022-02-11 ENCOUNTER — OFFICE VISIT (OUTPATIENT)
Dept: ENDOCRINOLOGY | Facility: HOSPITAL | Age: 22
End: 2022-02-11
Payer: COMMERCIAL

## 2022-02-11 VITALS
DIASTOLIC BLOOD PRESSURE: 74 MMHG | SYSTOLIC BLOOD PRESSURE: 114 MMHG | HEART RATE: 67 BPM | WEIGHT: 179.4 LBS | HEIGHT: 71 IN | BODY MASS INDEX: 25.11 KG/M2

## 2022-02-11 DIAGNOSIS — E10.65 TYPE 1 DIABETES MELLITUS WITH HYPERGLYCEMIA (HCC): Primary | ICD-10-CM

## 2022-02-11 DIAGNOSIS — E78.41 ELEVATED LIPOPROTEIN(A): ICD-10-CM

## 2022-02-11 PROCEDURE — 95251 CONT GLUC MNTR ANALYSIS I&R: CPT | Performed by: NURSE PRACTITIONER

## 2022-02-11 PROCEDURE — 99214 OFFICE O/P EST MOD 30 MIN: CPT | Performed by: NURSE PRACTITIONER

## 2022-02-11 PROCEDURE — 3008F BODY MASS INDEX DOCD: CPT | Performed by: NURSE PRACTITIONER

## 2022-02-11 PROCEDURE — 1036F TOBACCO NON-USER: CPT | Performed by: NURSE PRACTITIONER

## 2022-02-11 NOTE — PROGRESS NOTES
Roxanne Valentin 19 y o  male MRN: [id number]    Encounter: [id number]      Assessment/Plan     Assessment: This is a 19y o -year-old male with Type 1 diabetes  Plan:  1  Type 1 diabetes:  His most recent hemoglobin A1c is  Elevated to 8  3   Download of his dex com reveals hyperglycemia around dinner time and throughout the evening  After some discussion,  there seems to be a continuing issue with taking his insulin and eating immediately and also an issue with his carbohydrate counting  I provided him with samples of FiASP to take just prior to his meals  I have also asked him to increase his dose at dinnertime to 10 units and continue 8 units with breakfast and lunch  His Basaglar dose remains same   I have asked him to upload his Dexcom in 1-2 weeks so that it can be reviewed so changes can be made to help his glycemic control throughout the day    I have asked him to follow-up with diabetes education for review of carbohydrate counting and flexible insulin therapy   Check hemoglobin A1c and comprehensive metabolic panel prior to next visit      2   Hyperlipidemia:  Check fasting lipid panel prior to next visit  CC:Type 1 Diabetes follow-up    History of Present Illness     HPI:  19 y  o  male with type 1 diabetes diagnosed approximately 6 months ago   He was admitted to the hospital in early November in diabetic ketoacidosis and was found to have type 1 diabetes   His most recent hemoglobin A1c from January 20, 2022 was 8 4   He is currently utilizing Novolog 8 units at meals with Basaglar 20 units at bedtime  He denies chest pain or shortness of breath   He obtained a diabetic eye exam on March 20, 2019 which showed no retinopathy   His most recent diabetic foot exam was performed on November 5, 2020  He denies neuropathy, nephropathy, retinopathy, heart attack, stroke and claudication but does admit to none       Hypoglycemic episodes: Rare      Blood Sugar/Glucometer/Pump/CGM review: Dex com download from January 29 through February 11, 2022 reveals an average glucose of 176 with a standard deviation of at 70  He is in target range 39% of the time with 2% low or very low and 69% high or very high  Review of Systems   Constitutional: Negative  Negative for chills, fatigue and fever  HENT: Negative  Negative for trouble swallowing and voice change  Eyes: Negative for visual disturbance  Respiratory: Negative for cough and shortness of breath  Cardiovascular: Negative for chest pain and palpitations  Gastrointestinal: Negative for abdominal pain, constipation, diarrhea, nausea and vomiting  Endocrine: Negative for cold intolerance, heat intolerance, polydipsia, polyphagia and polyuria  Genitourinary: Negative  Musculoskeletal: Negative  Skin: Negative  Allergic/Immunologic: Negative  Neurological: Negative for syncope, light-headedness and headaches  Hematological: Negative  Psychiatric/Behavioral: Negative  All other systems reviewed and are negative  Historical Information   Past Medical History:   Diagnosis Date    Fracture of phalanx of toe     Resolved 2/1/2016      No past surgical history on file    Social History   Social History     Substance and Sexual Activity   Alcohol Use No     Social History     Substance and Sexual Activity   Drug Use No     Social History     Tobacco Use   Smoking Status Never Smoker   Smokeless Tobacco Never Used     Family History:   Family History   Problem Relation Age of Onset    Diabetes Family     No Known Problems Mother     No Known Problems Father     No Known Problems Sister     No Known Problems Brother     No Known Problems Brother     No Known Problems Brother     No Known Problems Sister        Meds/Allergies   Current Outpatient Medications   Medication Sig Dispense Refill    acetone, urine, test strip 1 strip by Does not apply route as needed for high blood sugar 25 each 4    Continuous Blood Gluc  ([address]) SHAYNA Use  to scan blood sugars daily  1 Device 0    Continuous Blood Gluc Sensor (Dexcom G6 Sensor) MISC CHANGE SENSOR EVERY 10 DAYS  3 each 10    Continuous Blood Gluc Transmit (Dexcom G6 Transmitter) MISC Use as directed to test blood sugars 1 each 3    glucagon (GLUCAGON EMERGENCY) 1 MG injection Inject 1 mg under the skin once as needed for low blood sugar for up to 1 dose 1 each 5    insulin aspart (NovoLOG FlexPen) 100 UNIT/ML injection pen Inject 8 Units under the skin 3 (three) times a day with meals 15 mL 6    insulin glargine (Basaglar KwikPen) 100 units/mL injection pen Inject 20 Units under the skin daily 15 mL 1    Insulin Pen Needle (BD Pen Needle Renita U/F) 32G X 4 MM MISC USE WITH INSULIN UP TO 5 TIMES DAILY 500 each 3    Accu-Chek Softclix Lancets lancets Check blood sugars 4 times a day (Patient not taking: Reported on 11/5/2021) 400 each 2    FREESTYLE LITE test strip Test blood sugars 4 times daily (Patient not taking: Reported on 11/5/2021) 400 each 3    glucose blood (Accu-Chek Guide) test strip Check blood sugars 4 times a day (Patient not taking: Reported on 11/5/2021) 400 each 2    rosuvastatin (CRESTOR) 5 mg tablet Take 1 tablet (5 mg total) by mouth daily (Patient not taking: Reported on 12/10/2020) 90 tablet 3     No current facility-administered medications for this visit  No Known Allergies    Objective   Vitals: Blood pressure 114/74, pulse 67, height 5' 11" (1 803 m), weight 81 4 kg (179 lb 6 4 oz)      Physical Exam    Lab Results:   Lab Results   Component Value Date/Time    Hemoglobin A1C 8 4 (H) 01/20/2022 07:10 AM    Hemoglobin A1C 7 0 (H) 10/15/2021 07:43 AM    Hemoglobin A1C 7 2 (H) 07/02/2021 07:44 AM    White Blood Cell Count 3 0 (L) 01/20/2022 07:10 AM    White Blood Cell Count 6 9 10/15/2021 07:43 AM    Hemoglobin 15 4 01/20/2022 07:10 AM    Hemoglobin 15 8 10/15/2021 07:43 AM    HCT 45 4 01/20/2022 07:10 AM    HCT 47 2 10/15/2021 07:43 AM    MCV 88 8 01/20/2022 07:10 AM    MCV 92 2 10/15/2021 07:43 AM    Platelet Count 583 97/07/0815 07:10 AM    Platelet Count 132 76/16/6736 07:43 AM    BUN 23 01/20/2022 07:10 AM    BUN 32 (H) 10/15/2021 07:43 AM    BUN 32 (H) 07/02/2021 07:44 AM    Potassium 4 4 01/20/2022 07:10 AM    Potassium 4 6 10/15/2021 07:43 AM    Potassium 4 2 07/02/2021 07:44 AM    Chloride 104 01/20/2022 07:10 AM    Chloride 101 10/15/2021 07:43 AM    Chloride 103 07/02/2021 07:44 AM    CO2 30 01/20/2022 07:10 AM    CO2 27 10/15/2021 07:43 AM    CO2 27 07/02/2021 07:44 AM    Creatinine 1 04 01/20/2022 07:10 AM    Creatinine 1 18 10/15/2021 07:43 AM    Creatinine 1 20 07/02/2021 07:44 AM    AST 17 01/20/2022 07:10 AM    AST 34 10/15/2021 07:43 AM    AST 16 07/02/2021 07:44 AM    ALT 13 01/20/2022 07:10 AM    ALT 22 10/15/2021 07:43 AM    ALT 15 07/02/2021 07:44 AM    Albumin 4 5 01/20/2022 07:10 AM    Albumin 4 7 10/15/2021 07:43 AM    Albumin 4 5 07/02/2021 07:44 AM    Globulin 1 9 01/20/2022 07:10 AM    Globulin 2 2 10/15/2021 07:43 AM    Globulin 2 1 07/02/2021 07:44 AM    HDL 66 10/15/2021 07:43 AM    HDL 50 03/18/2021 07:22 AM    Triglycerides 114 10/15/2021 07:43 AM    Triglycerides 84 03/18/2021 07:22 AM       Portions of the record may have been created with voice recognition software  Occasional wrong word or "sound a like" substitutions may have occurred due to the inherent limitations of voice recognition software  Read the chart carefully and recognize, using context, where substitutions have occurred

## 2022-02-11 NOTE — PATIENT INSTRUCTIONS
Be mindful of diet      You were dehydrated on your blood work   Please stay hydrated with water      For now, continue Basaglar at current dose      Continue Humalog at 8 units at breakfast and lunch but increase to 10 units at dinner      Try Fiasp    Take it immediately before eating      Continue to utilize the DEX COM and send a record to the office in 2 weeks for review      Meet with diabetic education for a review of carb counting and  I:C ratios      Obtain lab work as prescribed

## 2022-03-25 DIAGNOSIS — E10.9 DM W/O COMPLICATION TYPE I (HCC): ICD-10-CM

## 2022-03-28 RX ORDER — INSULIN GLARGINE 100 [IU]/ML
20 INJECTION, SOLUTION SUBCUTANEOUS DAILY
Qty: 15 ML | Refills: 1 | Status: SHIPPED | OUTPATIENT
Start: 2022-03-28

## 2022-04-19 ENCOUNTER — TELEPHONE (OUTPATIENT)
Dept: ENDOCRINOLOGY | Facility: HOSPITAL | Age: 22
End: 2022-04-19

## 2022-05-26 ENCOUNTER — OFFICE VISIT (OUTPATIENT)
Dept: ENDOCRINOLOGY | Facility: HOSPITAL | Age: 22
End: 2022-05-26

## 2022-05-26 VITALS
BODY MASS INDEX: 25.2 KG/M2 | WEIGHT: 180 LBS | SYSTOLIC BLOOD PRESSURE: 118 MMHG | HEART RATE: 64 BPM | HEIGHT: 71 IN | DIASTOLIC BLOOD PRESSURE: 70 MMHG

## 2022-05-26 DIAGNOSIS — E10.65 TYPE 1 DIABETES MELLITUS WITH HYPERGLYCEMIA (HCC): Primary | ICD-10-CM

## 2022-05-26 PROCEDURE — 99213 OFFICE O/P EST LOW 20 MIN: CPT | Performed by: NURSE PRACTITIONER

## 2022-05-26 NOTE — PATIENT INSTRUCTIONS
Be mindful of diet  Please stay hydrated with water  For now, increase Basaglar to 26 units  Continue Humalog at 8 units at breakfast and lunch but increase to 10 units at dinner  Continue to utilize the DEX COM and send a record to the office in 2 weeks for review  Obtain lab work as prescribed

## 2022-05-26 NOTE — PROGRESS NOTES
Tom Burns 23 y o  male MRN: [id number]    Encounter: [id number]      Assessment/Plan     Assessment: This is a 23y o -year-old male with type 1 diabetes  Plan:  1  Type 1 diabetes: There is no recent hemoglobin A1c at the time visit  Amrit Santoro of his dex com reveals hyperglycemia around dinner time and throughout the evening   I have also asked him to increase his dose at dinnertime to 10 units and continue 8 units with breakfast and lunch   His Basaglar dose remains same   I have asked him to upload his Dexcom in 1-2 weeks so that it can be reviewed so changes can be made to help his glycemic control throughout the day   Reviewed the metabolic consequences of uncontrolled diabetes including neuropathy, nephropathy and retinopathy   Check hemoglobin A1c and comprehensive metabolic panel prior to next visit      2   Hyperlipidemia:  Fasting lipid panel is not available in the office today  CC:  Type 1 Diabetes follow-up    History of Present Illness     HPI:  23 y  o  male with type 1 diabetes diagnosed approximately 6 months ago   He was admitted to the hospital in early November in diabetic ketoacidosis and was found to have type 1 diabetes   His most recent hemoglobin A1c from January 20, 2022 was 8 4   He is currently utilizing Novolog 8 units at meals with Basaglar 24 units at bedtime  He denies chest pain or shortness of breath  He obtained a diabetic eye exam on March 20, 2019 which showed no retinopathy   His most recent diabetic foot exam was performed on November 5, 2020  He denies neuropathy, nephropathy, retinopathy, heart attack, stroke and claudication but does admit to none       Hypoglycemic episodes: Rare      Blood Sugar/Glucometer/Pump/CGM review: Dex com download from May 13 and May 26, 2022 reveals an average glucose of 229 with a standard deviation of at 89  He is in target range 35% of the time with 1% low or very low and 64% high or very high       Review of Systems Constitutional: Negative  Negative for chills, fatigue and fever  HENT: Negative  Negative for trouble swallowing and voice change  Eyes: Negative for visual disturbance  Respiratory: Negative for cough and shortness of breath  Cardiovascular: Negative for chest pain and palpitations  Gastrointestinal: Negative for abdominal pain, constipation, diarrhea, nausea and vomiting  Endocrine: Negative for cold intolerance, heat intolerance, polydipsia, polyphagia and polyuria  Genitourinary: Negative  Musculoskeletal: Negative  Skin: Negative  Allergic/Immunologic: Negative  Neurological: Negative for dizziness, syncope, light-headedness and headaches  Hematological: Negative  Psychiatric/Behavioral: Negative  All other systems reviewed and are negative  Historical Information   Past Medical History:   Diagnosis Date    Fracture of phalanx of toe     Resolved 2/1/2016      No past surgical history on file  Social History   Social History     Substance and Sexual Activity   Alcohol Use No     Social History     Substance and Sexual Activity   Drug Use No     Social History     Tobacco Use   Smoking Status Never Smoker   Smokeless Tobacco Never Used     Family History:   Family History   Problem Relation Age of Onset    Diabetes Family     No Known Problems Mother     No Known Problems Father     No Known Problems Sister     No Known Problems Brother     No Known Problems Brother     No Known Problems Brother     No Known Problems Sister        Meds/Allergies   Current Outpatient Medications   Medication Sig Dispense Refill    acetone, urine, test strip 1 strip by Does not apply route as needed for high blood sugar 25 each 4    Basaglar KwikPen 100 units/mL injection pen INJECT 20 UNITS UNDER THE SKIN DAILY 15 mL 1    Continuous Blood Gluc  (DEXCOM G6 ) SHAYNA Use  to scan blood sugars daily   1 Device 0    Continuous Blood Gluc Sensor (Dexcom G6 Sensor) MISC CHANGE SENSOR EVERY 10 DAYS  3 each 10    Continuous Blood Gluc Transmit (Dexcom G6 Transmitter) MISC Use as directed to test blood sugars 1 each 3    glucagon (GLUCAGON EMERGENCY) 1 MG injection Inject 1 mg under the skin once as needed for low blood sugar for up to 1 dose 1 each 5    insulin aspart (NovoLOG FlexPen) 100 UNIT/ML injection pen Inject 8 Units under the skin 3 (three) times a day with meals 15 mL 6    Insulin Pen Needle (BD Pen Needle Renita U/F) 32G X 4 MM MISC USE WITH INSULIN UP TO 5 TIMES DAILY 500 each 3    Accu-Chek Softclix Lancets lancets Check blood sugars 4 times a day (Patient not taking: No sig reported) 400 each 2    FREESTYLE LITE test strip Test blood sugars 4 times daily (Patient not taking: No sig reported) 400 each 3    glucose blood (Accu-Chek Guide) test strip Check blood sugars 4 times a day (Patient not taking: No sig reported) 400 each 2    rosuvastatin (CRESTOR) 5 mg tablet Take 1 tablet (5 mg total) by mouth daily (Patient not taking: No sig reported) 90 tablet 3     No current facility-administered medications for this visit  No Known Allergies    Objective   Vitals: Blood pressure 118/70, pulse 64, height 5' 11" (1 803 m), weight 81 6 kg (180 lb)  Physical Exam  Vitals reviewed  Constitutional:       Appearance: He is well-developed  HENT:      Head: Normocephalic and atraumatic  Nose: Nose normal    Eyes:      Conjunctiva/sclera: Conjunctivae normal       Pupils: Pupils are equal, round, and reactive to light  Cardiovascular:      Rate and Rhythm: Normal rate and regular rhythm  Heart sounds: Normal heart sounds  Pulmonary:      Effort: Pulmonary effort is normal       Breath sounds: Normal breath sounds  Abdominal:      General: Bowel sounds are normal       Palpations: Abdomen is soft  Musculoskeletal:         General: Normal range of motion  Cervical back: Normal range of motion and neck supple     Skin:     General: Skin is warm and dry  Neurological:      Mental Status: He is alert and oriented to person, place, and time  Psychiatric:         Behavior: Behavior normal          Thought Content: Thought content normal          Judgment: Judgment normal        Lab Results:   Lab Results   Component Value Date/Time    Hemoglobin A1C 8 4 (H) 01/20/2022 07:10 AM    Hemoglobin A1C 7 0 (H) 10/15/2021 07:43 AM    Hemoglobin A1C 7 2 (H) 07/02/2021 07:44 AM    White Blood Cell Count 3 0 (L) 01/20/2022 07:10 AM    White Blood Cell Count 6 9 10/15/2021 07:43 AM    Hemoglobin 15 4 01/20/2022 07:10 AM    Hemoglobin 15 8 10/15/2021 07:43 AM    HCT 45 4 01/20/2022 07:10 AM    HCT 47 2 10/15/2021 07:43 AM    MCV 88 8 01/20/2022 07:10 AM    MCV 92 2 10/15/2021 07:43 AM    Platelet Count 048 48/68/8428 07:10 AM    Platelet Count 688 92/08/6535 07:43 AM    BUN 23 01/20/2022 07:10 AM    BUN 32 (H) 10/15/2021 07:43 AM    BUN 32 (H) 07/02/2021 07:44 AM    Potassium 4 4 01/20/2022 07:10 AM    Potassium 4 6 10/15/2021 07:43 AM    Potassium 4 2 07/02/2021 07:44 AM    Chloride 104 01/20/2022 07:10 AM    Chloride 101 10/15/2021 07:43 AM    Chloride 103 07/02/2021 07:44 AM    CO2 30 01/20/2022 07:10 AM    CO2 27 10/15/2021 07:43 AM    CO2 27 07/02/2021 07:44 AM    Creatinine 1 04 01/20/2022 07:10 AM    Creatinine 1 18 10/15/2021 07:43 AM    Creatinine 1 20 07/02/2021 07:44 AM    AST 17 01/20/2022 07:10 AM    AST 34 10/15/2021 07:43 AM    AST 16 07/02/2021 07:44 AM    ALT 13 01/20/2022 07:10 AM    ALT 22 10/15/2021 07:43 AM    ALT 15 07/02/2021 07:44 AM    Albumin 4 5 01/20/2022 07:10 AM    Albumin 4 7 10/15/2021 07:43 AM    Albumin 4 5 07/02/2021 07:44 AM    Globulin 1 9 01/20/2022 07:10 AM    Globulin 2 2 10/15/2021 07:43 AM    Globulin 2 1 07/02/2021 07:44 AM    HDL 66 10/15/2021 07:43 AM    Triglycerides 114 10/15/2021 07:43 AM     Portions of the record may have been created with voice recognition software   Occasional wrong word or "sound a like" substitutions may have occurred due to the inherent limitations of voice recognition software  Read the chart carefully and recognize, using context, where substitutions have occurred

## 2022-05-27 ENCOUNTER — TELEPHONE (OUTPATIENT)
Dept: OTHER | Facility: OTHER | Age: 22
End: 2022-05-27

## 2022-05-27 LAB
ALBUMIN SERPL-MCNC: 4.6 G/DL (ref 3.6–5.1)
ALBUMIN/GLOB SERPL: 2.3 (CALC) (ref 1–2.5)
ALP SERPL-CCNC: 69 U/L (ref 36–130)
ALT SERPL-CCNC: 13 U/L (ref 9–46)
AST SERPL-CCNC: 14 U/L (ref 10–40)
BASOPHILS # BLD AUTO: 32 CELLS/UL (ref 0–200)
BASOPHILS NFR BLD AUTO: 0.7 %
BILIRUB SERPL-MCNC: 0.8 MG/DL (ref 0.2–1.2)
BUN SERPL-MCNC: 25 MG/DL (ref 7–25)
BUN/CREAT SERPL: ABNORMAL (CALC) (ref 6–22)
CALCIUM SERPL-MCNC: 9.9 MG/DL (ref 8.6–10.3)
CHLORIDE SERPL-SCNC: 99 MMOL/L (ref 98–110)
CHOLEST SERPL-MCNC: 176 MG/DL
CHOLEST/HDLC SERPL: 3 (CALC)
CO2 SERPL-SCNC: 26 MMOL/L (ref 20–32)
CREAT SERPL-MCNC: 1.06 MG/DL (ref 0.6–1.35)
EOSINOPHIL # BLD AUTO: 51 CELLS/UL (ref 15–500)
EOSINOPHIL NFR BLD AUTO: 1.1 %
ERYTHROCYTE [DISTWIDTH] IN BLOOD BY AUTOMATED COUNT: 11.7 % (ref 11–15)
EST. AVERAGE GLUCOSE BLD GHB EST-MCNC: 200 MG/DL
EST. AVERAGE GLUCOSE BLD GHB EST-SCNC: 11.1 MMOL/L
GLOBULIN SER CALC-MCNC: 2 G/DL (CALC) (ref 1.9–3.7)
GLUCOSE SERPL-MCNC: 235 MG/DL (ref 65–99)
HBA1C MFR BLD: 8.6 % OF TOTAL HGB
HCT VFR BLD AUTO: 45.9 % (ref 38.5–50)
HDLC SERPL-MCNC: 58 MG/DL
HGB BLD-MCNC: 15.5 G/DL (ref 13.2–17.1)
LDLC SERPL CALC-MCNC: 101 MG/DL (CALC)
LYMPHOCYTES # BLD AUTO: 1845 CELLS/UL (ref 850–3900)
LYMPHOCYTES NFR BLD AUTO: 40.1 %
MCH RBC QN AUTO: 30.5 PG (ref 27–33)
MCHC RBC AUTO-ENTMCNC: 33.8 G/DL (ref 32–36)
MCV RBC AUTO: 90.2 FL (ref 80–100)
MONOCYTES # BLD AUTO: 368 CELLS/UL (ref 200–950)
MONOCYTES NFR BLD AUTO: 8 %
NEUTROPHILS # BLD AUTO: 2305 CELLS/UL (ref 1500–7800)
NEUTROPHILS NFR BLD AUTO: 50.1 %
NONHDLC SERPL-MCNC: 118 MG/DL (CALC)
PLATELET # BLD AUTO: 209 THOUSAND/UL (ref 140–400)
PMV BLD REES-ECKER: 11 FL (ref 7.5–12.5)
POTASSIUM SERPL-SCNC: 4.8 MMOL/L (ref 3.5–5.3)
PROT SERPL-MCNC: 6.6 G/DL (ref 6.1–8.1)
RBC # BLD AUTO: 5.09 MILLION/UL (ref 4.2–5.8)
SL AMB EGFR AFRICAN AMERICAN: 113 ML/MIN/1.73M2
SL AMB EGFR NON AFRICAN AMERICAN: 98 ML/MIN/1.73M2
SODIUM SERPL-SCNC: 135 MMOL/L (ref 135–146)
TRIGL SERPL-MCNC: 80 MG/DL
WBC # BLD AUTO: 4.6 THOUSAND/UL (ref 3.8–10.8)

## 2022-05-27 NOTE — RESULT ENCOUNTER NOTE
Please call the patient regarding abnormal result  Lab work was not available for review at his office visit yesterday  His hemoglobin A1c is 8 6  We made changes to his regimen last night at his office visit  Please ask him to send in a dex com download in 1-2 weeks for review and further adjustment  Cholesterol has improved with dietary changes  His fasting glucose was 235 on his comprehensive metabolic panel    CBC is normal

## 2022-05-31 NOTE — TELEPHONE ENCOUNTER
Unable to reach or leave voicemail  Result note states patient's mother was informed of results already

## 2022-06-15 DIAGNOSIS — E10.9 TYPE 1 DIABETES MELLITUS WITHOUT COMPLICATION (HCC): ICD-10-CM

## 2022-06-15 NOTE — TELEPHONE ENCOUNTER
Patient left voicemail stating he is doing well on the the current regimen and needs a refill  Dexcom report printed

## 2022-06-16 ENCOUNTER — TELEPHONE (OUTPATIENT)
Dept: ENDOCRINOLOGY | Facility: HOSPITAL | Age: 22
End: 2022-06-16

## 2022-06-16 RX ORDER — INSULIN ASPART 100 [IU]/ML
INJECTION, SOLUTION INTRAVENOUS; SUBCUTANEOUS
Qty: 15 ML | Refills: 4 | Status: SHIPPED | OUTPATIENT
Start: 2022-06-16

## 2022-06-17 NOTE — TELEPHONE ENCOUNTER
Reviewed Hoang's Dexcom download  His average glucose was 160  He is experiencing some overnight low blood sugars  For this I would suggest that we decrease his Basaglar to 22 units  Continue NovoLog at current dose  Please send in another download in 2 weeks for review

## 2022-09-01 ENCOUNTER — OFFICE VISIT (OUTPATIENT)
Dept: ENDOCRINOLOGY | Facility: HOSPITAL | Age: 22
End: 2022-09-01
Payer: COMMERCIAL

## 2022-09-01 VITALS
SYSTOLIC BLOOD PRESSURE: 126 MMHG | DIASTOLIC BLOOD PRESSURE: 80 MMHG | WEIGHT: 176 LBS | BODY MASS INDEX: 24.64 KG/M2 | HEIGHT: 71 IN | HEART RATE: 88 BPM

## 2022-09-01 DIAGNOSIS — E10.65 TYPE 1 DIABETES MELLITUS WITH HYPERGLYCEMIA (HCC): Primary | ICD-10-CM

## 2022-09-01 PROCEDURE — 99213 OFFICE O/P EST LOW 20 MIN: CPT | Performed by: NURSE PRACTITIONER

## 2022-09-01 NOTE — PROGRESS NOTES
Sue Ortez 23 y o  male MRN: [id number]    Encounter: [id number]      Assessment/Plan     Assessment: This is a 23y o -year-old male with type 1 diabetes  Plan:  1  Type 1 diabetes: There is no recent hemoglobin A1c at the time visit  [facility] of his dex com reveals hyperglycemia around dinner time and throughout the evening  We will often times, home from work and start snacking before dinner   I have also asked him to increase his dose at dinnertime to 12 units and continue 6 units with breakfast and lunch   His Basaglar dose remains same   I have asked him to upload his Dexcom in 1-2 weeks so that it can be reviewed so changes can be made to help his glycemic control throughout the day   Reviewed the metabolic consequences of uncontrolled diabetes including neuropathy, nephropathy and retinopathy   Check hemoglobin A1c and comprehensive metabolic panel prior to next visit      2   Hyperlipidemia:  Fasting lipid panel is not available in the office today  CC:  Type 1 Diabetes follow-up    History of Present Illness     HPI:  23 y  o  male with type 1 diabetes diagnosed approximately 6 months ago   He was admitted to the hospital in early November in diabetic ketoacidosis and was found to have type 1 diabetes  Hemoglobin A1c was drawn this morning and has not resulted at the time of the visit   He is currently utilizing Novolog 6 units at breakfast and lunch with 10 units at dinner with Basaglar 22 units at bedtime  He denies chest pain or shortness of breath   He obtained a diabetic eye exam on March 20, 2019 which showed no retinopathy   His most recent diabetic foot exam was performed on November 5, 2020  He denies neuropathy, nephropathy, retinopathy, heart attack, stroke and claudication but does admit to none       Hypoglycemic episodes: Rare      Blood Sugar/Glucometer/Pump/CGM review: Dex com download from August 19 to September 1, 2022 reveals an average glucose of 194 with a standard deviation of at 84  He is in target range 35% of the time with  48 % low or very low and 48 % high or very high  Review of Systems   Constitutional: Negative  Negative for chills, fatigue and fever  HENT: Negative  Negative for trouble swallowing and voice change  Eyes: Negative for photophobia, pain, discharge, redness, itching and visual disturbance  Respiratory: Negative for cough and shortness of breath  Cardiovascular: Negative for chest pain and palpitations  Gastrointestinal: Negative for abdominal pain, constipation, diarrhea, nausea and vomiting  Endocrine: Negative for cold intolerance, heat intolerance, polydipsia, polyphagia and polyuria  Genitourinary: Negative  Musculoskeletal: Negative  Skin: Negative  Allergic/Immunologic: Negative  Neurological: Negative for dizziness, syncope, light-headedness and headaches  Hematological: Negative  Psychiatric/Behavioral: Negative  All other systems reviewed and are negative  Historical Information   Past Medical History:   Diagnosis Date    Fracture of phalanx of toe     Resolved 2/1/2016      No past surgical history on file    Social History   Social History     Substance and Sexual Activity   Alcohol Use No     Social History     Substance and Sexual Activity   Drug Use No     Social History     Tobacco Use   Smoking Status Never Smoker   Smokeless Tobacco Never Used     Family History:   Family History   Problem Relation Age of Onset    Diabetes Family     No Known Problems Mother     No Known Problems Father     No Known Problems Sister     No Known Problems Brother     No Known Problems Brother     No Known Problems Brother     No Known Problems Sister        Meds/Allergies   Current Outpatient Medications   Medication Sig Dispense Refill    acetone, urine, test strip 1 strip by Does not apply route as needed for high blood sugar 25 each 4    Basaglar KwikPen 100 units/mL injection pen INJECT 20 UNITS UNDER THE SKIN DAILY 15 mL 1    Continuous Blood Gluc  (DEXCOM G6 ) SHAYNA Use  to scan blood sugars daily  1 Device 0    Continuous Blood Gluc Sensor (Dexcom G6 Sensor) MISC CHANGE SENSOR EVERY 10 DAYS  3 each 10    Continuous Blood Gluc Transmit (Dexcom G6 Transmitter) MISC Use as directed to test blood sugars 1 each 3    glucagon (GLUCAGON EMERGENCY) 1 MG injection Inject 1 mg under the skin once as needed for low blood sugar for up to 1 dose 1 each 5    insulin aspart (NovoLOG FlexPen) 100 UNIT/ML injection pen Inject 8 units at breakfast and lunch, 10 units at dinner 15 mL 4    Insulin Pen Needle (BD Pen Needle Renita U/F) 32G X 4 MM MISC USE WITH INSULIN UP TO 5 TIMES DAILY 500 each 3    Accu-Chek Softclix Lancets lancets Check blood sugars 4 times a day (Patient not taking: No sig reported) 400 each 2    FREESTYLE LITE test strip Test blood sugars 4 times daily (Patient not taking: No sig reported) 400 each 3    glucose blood (Accu-Chek Guide) test strip Check blood sugars 4 times a day (Patient not taking: No sig reported) 400 each 2    rosuvastatin (CRESTOR) 5 mg tablet Take 1 tablet (5 mg total) by mouth daily (Patient not taking: No sig reported) 90 tablet 3     No current facility-administered medications for this visit  No Known Allergies    Objective   Vitals: Height 5' 11" (1 803 m)  Physical Exam  Vitals reviewed  Constitutional:       Appearance: He is well-developed  HENT:      Head: Normocephalic and atraumatic  Nose: Nose normal    Eyes:      Conjunctiva/sclera: Conjunctivae normal       Pupils: Pupils are equal, round, and reactive to light  Cardiovascular:      Rate and Rhythm: Normal rate and regular rhythm  Heart sounds: Normal heart sounds  Pulmonary:      Effort: Pulmonary effort is normal       Breath sounds: Normal breath sounds  Abdominal:      General: Bowel sounds are normal       Palpations: Abdomen is soft     Musculoskeletal: General: Normal range of motion  Cervical back: Normal range of motion and neck supple  Skin:     General: Skin is warm and dry  Neurological:      Mental Status: He is alert and oriented to person, place, and time  Psychiatric:         Behavior: Behavior normal          Thought Content:  Thought content normal          Judgment: Judgment normal        Lab Results:   Lab Results   Component Value Date/Time    Hemoglobin A1C 8 6 (H) 05/26/2022 07:28 AM    Hemoglobin A1C 8 4 (H) 01/20/2022 07:10 AM    Hemoglobin A1C 7 0 (H) 10/15/2021 07:43 AM    White Blood Cell Count 4 6 05/26/2022 07:28 AM    White Blood Cell Count 3 0 (L) 01/20/2022 07:10 AM    White Blood Cell Count 6 9 10/15/2021 07:43 AM    Hemoglobin 15 5 05/26/2022 07:28 AM    Hemoglobin 15 4 01/20/2022 07:10 AM    Hemoglobin 15 8 10/15/2021 07:43 AM    HCT 45 9 05/26/2022 07:28 AM    HCT 45 4 01/20/2022 07:10 AM    HCT 47 2 10/15/2021 07:43 AM    MCV 90 2 05/26/2022 07:28 AM    MCV 88 8 01/20/2022 07:10 AM    MCV 92 2 10/15/2021 07:43 AM    Platelet Count 677 60/30/5316 07:28 AM    Platelet Count 579 44/00/6468 07:10 AM    Platelet Count 351 20/11/6975 07:43 AM    BUN 25 05/26/2022 07:28 AM    BUN 23 01/20/2022 07:10 AM    BUN 32 (H) 10/15/2021 07:43 AM    Potassium 4 8 05/26/2022 07:28 AM    Potassium 4 4 01/20/2022 07:10 AM    Potassium 4 6 10/15/2021 07:43 AM    Chloride 99 05/26/2022 07:28 AM    Chloride 104 01/20/2022 07:10 AM    Chloride 101 10/15/2021 07:43 AM    CO2 26 05/26/2022 07:28 AM    CO2 30 01/20/2022 07:10 AM    CO2 27 10/15/2021 07:43 AM    Creatinine 1 06 05/26/2022 07:28 AM    Creatinine 1 04 01/20/2022 07:10 AM    Creatinine 1 18 10/15/2021 07:43 AM    AST 14 05/26/2022 07:28 AM    AST 17 01/20/2022 07:10 AM    AST 34 10/15/2021 07:43 AM    ALT 13 05/26/2022 07:28 AM    ALT 13 01/20/2022 07:10 AM    ALT 22 10/15/2021 07:43 AM    Albumin 4 6 05/26/2022 07:28 AM    Albumin 4 5 01/20/2022 07:10 AM    Albumin 4 7 10/15/2021 07:43 AM    Globulin 2 0 05/26/2022 07:28 AM    Globulin 1 9 01/20/2022 07:10 AM    Globulin 2 2 10/15/2021 07:43 AM    HDL 58 05/26/2022 07:28 AM    HDL 66 10/15/2021 07:43 AM    Triglycerides 80 05/26/2022 07:28 AM    Triglycerides 114 10/15/2021 07:43 AM       Portions of the record may have been created with voice recognition software  Occasional wrong word or "sound a like" substitutions may have occurred due to the inherent limitations of voice recognition software  Read the chart carefully and recognize, using context, where substitutions have occurred

## 2022-09-01 NOTE — PATIENT INSTRUCTIONS
Be mindful of diet  Please stay hydrated with water  For now, continue Basaglar 22 units  Continue Humalog at 6 units at breakfast and lunch but increase to 12 units at dinner  Continue to utilize the DEX COM and send a record to the office in 2 weeks for review  Obtain lab work as prescribed

## 2022-09-02 ENCOUNTER — TELEPHONE (OUTPATIENT)
Dept: ENDOCRINOLOGY | Facility: HOSPITAL | Age: 22
End: 2022-09-02

## 2022-09-02 LAB
ALBUMIN SERPL-MCNC: 4.3 G/DL (ref 3.6–5.1)
ALBUMIN/GLOB SERPL: 2.2 (CALC) (ref 1–2.5)
ALP SERPL-CCNC: 67 U/L (ref 36–130)
ALT SERPL-CCNC: 13 U/L (ref 9–46)
AST SERPL-CCNC: 17 U/L (ref 10–40)
BASOPHILS # BLD AUTO: 21 CELLS/UL (ref 0–200)
BASOPHILS NFR BLD AUTO: 0.6 %
BILIRUB SERPL-MCNC: 0.5 MG/DL (ref 0.2–1.2)
BUN SERPL-MCNC: 21 MG/DL (ref 7–25)
BUN/CREAT SERPL: ABNORMAL (CALC) (ref 6–22)
CALCIUM SERPL-MCNC: 9.4 MG/DL (ref 8.6–10.3)
CHLORIDE SERPL-SCNC: 99 MMOL/L (ref 98–110)
CO2 SERPL-SCNC: 30 MMOL/L (ref 20–32)
CREAT SERPL-MCNC: 1.11 MG/DL (ref 0.6–1.24)
EOSINOPHIL # BLD AUTO: 39 CELLS/UL (ref 15–500)
EOSINOPHIL NFR BLD AUTO: 1.1 %
ERYTHROCYTE [DISTWIDTH] IN BLOOD BY AUTOMATED COUNT: 11.6 % (ref 11–15)
EST. AVERAGE GLUCOSE BLD GHB EST-MCNC: 163 MG/DL
EST. AVERAGE GLUCOSE BLD GHB EST-SCNC: 9 MMOL/L
GFR/BSA.PRED SERPLBLD CYS-BASED-ARV: 95 ML/MIN/1.73M2
GLOBULIN SER CALC-MCNC: 2 G/DL (CALC) (ref 1.9–3.7)
GLUCOSE SERPL-MCNC: 264 MG/DL (ref 65–99)
HBA1C MFR BLD: 7.3 % OF TOTAL HGB
HCT VFR BLD AUTO: 44.9 % (ref 38.5–50)
HGB BLD-MCNC: 15.4 G/DL (ref 13.2–17.1)
LYMPHOCYTES # BLD AUTO: 1127 CELLS/UL (ref 850–3900)
LYMPHOCYTES NFR BLD AUTO: 32.2 %
MCH RBC QN AUTO: 30.7 PG (ref 27–33)
MCHC RBC AUTO-ENTMCNC: 34.3 G/DL (ref 32–36)
MCV RBC AUTO: 89.6 FL (ref 80–100)
MONOCYTES # BLD AUTO: 613 CELLS/UL (ref 200–950)
MONOCYTES NFR BLD AUTO: 17.5 %
NEUTROPHILS # BLD AUTO: 1701 CELLS/UL (ref 1500–7800)
NEUTROPHILS NFR BLD AUTO: 48.6 %
PLATELET # BLD AUTO: 190 THOUSAND/UL (ref 140–400)
PMV BLD REES-ECKER: 10.6 FL (ref 7.5–12.5)
POTASSIUM SERPL-SCNC: 4.9 MMOL/L (ref 3.5–5.3)
PROT SERPL-MCNC: 6.3 G/DL (ref 6.1–8.1)
RBC # BLD AUTO: 5.01 MILLION/UL (ref 4.2–5.8)
SODIUM SERPL-SCNC: 135 MMOL/L (ref 135–146)
TSH SERPL-ACNC: 1.03 MIU/L (ref 0.4–4.5)
WBC # BLD AUTO: 3.5 THOUSAND/UL (ref 3.8–10.8)

## 2022-09-02 PROCEDURE — 3051F HG A1C>EQUAL 7.0%<8.0%: CPT | Performed by: NURSE PRACTITIONER

## 2022-09-02 NOTE — TELEPHONE ENCOUNTER
Patients mother called  She noticed on the after visit summary that Rosuvastatin is listed on the medication list  She said that Shivani Hogue is not taking this medication and would like it removed

## 2022-09-07 NOTE — RESULT ENCOUNTER NOTE
Please call the patient regarding result  Hemoglobin A1c has improved to 7 3 but remains above goal   We discussed certain changes at the time of the visit  He should be sending in a report from his Dexcom in about another week or so for review  Random glucose was elevated on comprehensive metabolic panel at 911    TSH is normal

## 2022-09-12 ENCOUNTER — TELEPHONE (OUTPATIENT)
Dept: ENDOCRINOLOGY | Facility: HOSPITAL | Age: 22
End: 2022-09-12

## 2022-09-13 DIAGNOSIS — E10.9 DM W/O COMPLICATION TYPE I (HCC): ICD-10-CM

## 2022-09-13 RX ORDER — INSULIN GLARGINE 100 [IU]/ML
22 INJECTION, SOLUTION SUBCUTANEOUS DAILY
Qty: 15 ML | Refills: 1 | Status: SHIPPED | OUTPATIENT
Start: 2022-09-13

## 2022-11-14 DIAGNOSIS — E10.9 TYPE 1 DIABETES MELLITUS WITHOUT COMPLICATION (HCC): ICD-10-CM

## 2022-11-15 RX ORDER — BLOOD-GLUCOSE SENSOR
EACH MISCELLANEOUS
Qty: 3 EACH | Refills: 10 | Status: SHIPPED | OUTPATIENT
Start: 2022-11-15

## 2022-12-08 LAB
ALBUMIN SERPL-MCNC: 4.7 G/DL (ref 3.6–5.1)
ALBUMIN/GLOB SERPL: 2.2 (CALC) (ref 1–2.5)
ALP SERPL-CCNC: 71 U/L (ref 36–130)
ALT SERPL-CCNC: 23 U/L (ref 9–46)
AST SERPL-CCNC: 24 U/L (ref 10–40)
BASOPHILS # BLD AUTO: 30 CELLS/UL (ref 0–200)
BASOPHILS NFR BLD AUTO: 0.7 %
BILIRUB SERPL-MCNC: 1.1 MG/DL (ref 0.2–1.2)
BUN SERPL-MCNC: 22 MG/DL (ref 7–25)
BUN/CREAT SERPL: ABNORMAL (CALC) (ref 6–22)
CALCIUM SERPL-MCNC: 9.9 MG/DL (ref 8.6–10.3)
CHLORIDE SERPL-SCNC: 100 MMOL/L (ref 98–110)
CHOLEST SERPL-MCNC: 193 MG/DL
CHOLEST/HDLC SERPL: 2.8 (CALC)
CO2 SERPL-SCNC: 28 MMOL/L (ref 20–32)
CREAT SERPL-MCNC: 0.99 MG/DL (ref 0.6–1.24)
EOSINOPHIL # BLD AUTO: 39 CELLS/UL (ref 15–500)
EOSINOPHIL NFR BLD AUTO: 0.9 %
ERYTHROCYTE [DISTWIDTH] IN BLOOD BY AUTOMATED COUNT: 11.9 % (ref 11–15)
EST. AVERAGE GLUCOSE BLD GHB EST-MCNC: 169 MG/DL
EST. AVERAGE GLUCOSE BLD GHB EST-SCNC: 9.3 MMOL/L
GFR/BSA.PRED SERPLBLD CYS-BASED-ARV: 109 ML/MIN/1.73M2
GLOBULIN SER CALC-MCNC: 2.1 G/DL (CALC) (ref 1.9–3.7)
GLUCOSE SERPL-MCNC: 293 MG/DL (ref 65–99)
HBA1C MFR BLD: 7.5 % OF TOTAL HGB
HCT VFR BLD AUTO: 47 % (ref 38.5–50)
HDLC SERPL-MCNC: 68 MG/DL
HGB BLD-MCNC: 16.2 G/DL (ref 13.2–17.1)
LDLC SERPL CALC-MCNC: 109 MG/DL (CALC)
LYMPHOCYTES # BLD AUTO: 1789 CELLS/UL (ref 850–3900)
LYMPHOCYTES NFR BLD AUTO: 41.6 %
MCH RBC QN AUTO: 30.6 PG (ref 27–33)
MCHC RBC AUTO-ENTMCNC: 34.5 G/DL (ref 32–36)
MCV RBC AUTO: 88.7 FL (ref 80–100)
MONOCYTES # BLD AUTO: 473 CELLS/UL (ref 200–950)
MONOCYTES NFR BLD AUTO: 11 %
NEUTROPHILS # BLD AUTO: 1969 CELLS/UL (ref 1500–7800)
NEUTROPHILS NFR BLD AUTO: 45.8 %
NONHDLC SERPL-MCNC: 125 MG/DL (CALC)
PLATELET # BLD AUTO: 225 THOUSAND/UL (ref 140–400)
PMV BLD REES-ECKER: 10.7 FL (ref 7.5–12.5)
POTASSIUM SERPL-SCNC: 4.2 MMOL/L (ref 3.5–5.3)
PROT SERPL-MCNC: 6.8 G/DL (ref 6.1–8.1)
RBC # BLD AUTO: 5.3 MILLION/UL (ref 4.2–5.8)
SODIUM SERPL-SCNC: 137 MMOL/L (ref 135–146)
TRIGL SERPL-MCNC: 75 MG/DL
WBC # BLD AUTO: 4.3 THOUSAND/UL (ref 3.8–10.8)

## 2022-12-28 ENCOUNTER — DOCUMENTATION (OUTPATIENT)
Dept: ENDOCRINOLOGY | Facility: HOSPITAL | Age: 22
End: 2022-12-28

## 2023-02-08 ENCOUNTER — TELEPHONE (OUTPATIENT)
Dept: OTHER | Facility: OTHER | Age: 23
End: 2023-02-08

## 2023-02-09 NOTE — TELEPHONE ENCOUNTER
Spoke to the pharmacy  The Dexcom is covered and ready to be picked up and no prior auth is required  Patient has new insurance and the copay for the sensor is more expensive  For the [address], it is not covered, but Lantus is  The copay for that is over $400  I spoke to patients mom and advised that she call insurance to see if another insulin would be covered at a cheaper price

## 2023-02-19 DIAGNOSIS — E10.9 TYPE 1 DIABETES MELLITUS WITHOUT COMPLICATION (HCC): ICD-10-CM

## 2023-02-20 RX ORDER — BLOOD-GLUCOSE TRANSMITTER
EACH MISCELLANEOUS
Qty: 1 EACH | Refills: 3 | Status: SHIPPED | OUTPATIENT
Start: 2023-02-20

## 2023-02-24 DIAGNOSIS — E10.9 TYPE 1 DIABETES MELLITUS WITHOUT COMPLICATION (HCC): ICD-10-CM

## 2023-02-27 RX ORDER — PEN NEEDLE, DIABETIC 32GX 5/32"
NEEDLE, DISPOSABLE MISCELLANEOUS
Qty: 500 EACH | Refills: 3 | Status: SHIPPED | OUTPATIENT
Start: 2023-02-27

## 2023-03-06 ENCOUNTER — APPOINTMENT (EMERGENCY)
Dept: RADIOLOGY | Facility: HOSPITAL | Age: 23
End: 2023-03-06

## 2023-03-06 ENCOUNTER — HOSPITAL ENCOUNTER (INPATIENT)
Facility: HOSPITAL | Age: 23
LOS: 2 days | Discharge: HOME | End: 2023-03-08
Attending: EMERGENCY MEDICINE | Admitting: INTERNAL MEDICINE

## 2023-03-06 DIAGNOSIS — E87.5 HYPERKALEMIA: ICD-10-CM

## 2023-03-06 DIAGNOSIS — E10.9 DM W/O COMPLICATION TYPE I (HCC): ICD-10-CM

## 2023-03-06 DIAGNOSIS — E11.10 DKA (DIABETIC KETOACIDOSIS) (HCC): Primary | ICD-10-CM

## 2023-03-06 DIAGNOSIS — N17.9 AKI (ACUTE KIDNEY INJURY) (HCC): ICD-10-CM

## 2023-03-06 DIAGNOSIS — R11.2 NAUSEA AND VOMITING: ICD-10-CM

## 2023-03-06 PROBLEM — R65.10 SIRS (SYSTEMIC INFLAMMATORY RESPONSE SYNDROME) (HCC): Status: ACTIVE | Noted: 2023-03-06

## 2023-03-06 PROBLEM — E87.20 LACTIC ACIDOSIS: Status: ACTIVE | Noted: 2023-03-06

## 2023-03-06 PROBLEM — E10.10 TYPE 1 DIABETES MELLITUS WITH KETOACIDOSIS WITHOUT COMA (HCC): Status: ACTIVE | Noted: 2018-11-08

## 2023-03-06 PROBLEM — E87.1 HYPONATREMIA: Status: ACTIVE | Noted: 2023-03-06

## 2023-03-06 LAB
2HR DELTA HS TROPONIN: 2 NG/L
ALBUMIN SERPL BCP-MCNC: 5.1 G/DL (ref 3.5–5)
ALP SERPL-CCNC: 79 U/L (ref 34–104)
ALT SERPL W P-5'-P-CCNC: 26 U/L (ref 7–52)
ANION GAP SERPL CALCULATED.3IONS-SCNC: 14 MMOL/L (ref 4–13)
ANION GAP SERPL CALCULATED.3IONS-SCNC: 25 MMOL/L (ref 4–13)
APTT PPP: 26 SECONDS (ref 23–37)
AST SERPL W P-5'-P-CCNC: 20 U/L (ref 13–39)
BASE EXCESS BLDA CALC-SCNC: -16 MMOL/L (ref -2–3)
BASOPHILS # BLD AUTO: 0.02 THOUSANDS/ÂΜL (ref 0–0.1)
BASOPHILS NFR BLD AUTO: 0 % (ref 0–1)
BETA-HYDROXYBUTYRATE: 5.1 MMOL/L
BILIRUB SERPL-MCNC: 0.69 MG/DL (ref 0.2–1)
BILIRUB UR QL STRIP: NEGATIVE
BUN SERPL-MCNC: 31 MG/DL (ref 5–25)
BUN SERPL-MCNC: 37 MG/DL (ref 5–25)
CA-I BLD-SCNC: 1.29 MMOL/L (ref 1.12–1.32)
CALCIUM SERPL-MCNC: 9 MG/DL (ref 8.4–10.2)
CALCIUM SERPL-MCNC: 9.6 MG/DL (ref 8.4–10.2)
CARDIAC TROPONIN I PNL SERPL HS: 7 NG/L
CARDIAC TROPONIN I PNL SERPL HS: 9 NG/L
CHLORIDE SERPL-SCNC: 101 MMOL/L (ref 96–108)
CHLORIDE SERPL-SCNC: 92 MMOL/L (ref 96–108)
CLARITY UR: CLEAR
CO2 SERPL-SCNC: 11 MMOL/L (ref 21–32)
CO2 SERPL-SCNC: 17 MMOL/L (ref 21–32)
COLOR UR: ABNORMAL
CREAT SERPL-MCNC: 1.25 MG/DL (ref 0.6–1.3)
CREAT SERPL-MCNC: 1.8 MG/DL (ref 0.6–1.3)
EOSINOPHIL # BLD AUTO: 0 THOUSAND/ÂΜL (ref 0–0.61)
EOSINOPHIL NFR BLD AUTO: 0 % (ref 0–6)
ERYTHROCYTE [DISTWIDTH] IN BLOOD BY AUTOMATED COUNT: 11.8 % (ref 11.6–15.1)
FLUAV RNA RESP QL NAA+PROBE: NEGATIVE
FLUBV RNA RESP QL NAA+PROBE: NEGATIVE
GFR SERPL CREATININE-BSD FRML MDRD: 51 ML/MIN/1.73SQ M
GFR SERPL CREATININE-BSD FRML MDRD: 80 ML/MIN/1.73SQ M
GLUCOSE SERPL-MCNC: 210 MG/DL (ref 65–140)
GLUCOSE SERPL-MCNC: 221 MG/DL (ref 65–140)
GLUCOSE SERPL-MCNC: 274 MG/DL (ref 65–140)
GLUCOSE SERPL-MCNC: 288 MG/DL (ref 65–140)
GLUCOSE SERPL-MCNC: 312 MG/DL (ref 65–140)
GLUCOSE SERPL-MCNC: 398 MG/DL (ref 65–140)
GLUCOSE SERPL-MCNC: 453 MG/DL (ref 65–140)
GLUCOSE SERPL-MCNC: 465 MG/DL (ref 65–140)
GLUCOSE SERPL-MCNC: 608 MG/DL (ref 65–140)
GLUCOSE SERPL-MCNC: >500 MG/DL (ref 65–140)
GLUCOSE SERPL-MCNC: >600 MG/DL (ref 65–140)
GLUCOSE UR STRIP-MCNC: ABNORMAL MG/DL
HCO3 BLDA-SCNC: 13.5 MMOL/L (ref 24–30)
HCT VFR BLD AUTO: 53.8 % (ref 36.5–49.3)
HCT VFR BLD CALC: 55 % (ref 36.5–49.3)
HGB BLD-MCNC: 17.2 G/DL (ref 12–17)
HGB BLDA-MCNC: 18.7 G/DL (ref 12–17)
HGB UR QL STRIP.AUTO: NEGATIVE
IMM GRANULOCYTES # BLD AUTO: 0.08 THOUSAND/UL (ref 0–0.2)
IMM GRANULOCYTES NFR BLD AUTO: 1 % (ref 0–2)
INR PPP: 1.1 (ref 0.84–1.19)
KETONES UR STRIP-MCNC: ABNORMAL MG/DL
LACTATE SERPL-SCNC: 3.3 MMOL/L (ref 0.5–2)
LACTATE SERPL-SCNC: 7.7 MMOL/L (ref 0.5–2)
LEUKOCYTE ESTERASE UR QL STRIP: NEGATIVE
LIPASE SERPL-CCNC: 6 U/L (ref 11–82)
LYMPHOCYTES # BLD AUTO: 0.47 THOUSANDS/ÂΜL (ref 0.6–4.47)
LYMPHOCYTES NFR BLD AUTO: 4 % (ref 14–44)
MAGNESIUM SERPL-MCNC: 2.1 MG/DL (ref 1.9–2.7)
MCH RBC QN AUTO: 30.1 PG (ref 26.8–34.3)
MCHC RBC AUTO-ENTMCNC: 32 G/DL (ref 31.4–37.4)
MCV RBC AUTO: 94 FL (ref 82–98)
MONOCYTES # BLD AUTO: 0.64 THOUSAND/ÂΜL (ref 0.17–1.22)
MONOCYTES NFR BLD AUTO: 5 % (ref 4–12)
NEUTROPHILS # BLD AUTO: 12.09 THOUSANDS/ÂΜL (ref 1.85–7.62)
NEUTS SEG NFR BLD AUTO: 90 % (ref 43–75)
NITRITE UR QL STRIP: NEGATIVE
NRBC BLD AUTO-RTO: 0 /100 WBCS
PCO2 BLD: 15 MMOL/L (ref 21–32)
PCO2 BLD: 43 MM HG (ref 42–50)
PH BLD: 7.11 [PH] (ref 7.3–7.4)
PH UR STRIP.AUTO: 5 [PH]
PHOSPHATE SERPL-MCNC: 2 MG/DL (ref 2.7–4.5)
PLATELET # BLD AUTO: 316 THOUSANDS/UL (ref 149–390)
PMV BLD AUTO: 10.8 FL (ref 8.9–12.7)
PO2 BLD: 40 MM HG (ref 35–45)
POTASSIUM BLD-SCNC: 6 MMOL/L (ref 3.5–5.3)
POTASSIUM SERPL-SCNC: 5 MMOL/L (ref 3.5–5.3)
POTASSIUM SERPL-SCNC: 5.9 MMOL/L (ref 3.5–5.3)
PROCALCITONIN SERPL-MCNC: 5.88 NG/ML
PROT SERPL-MCNC: 8 G/DL (ref 6.4–8.4)
PROT UR STRIP-MCNC: NEGATIVE MG/DL
PROTHROMBIN TIME: 15 SECONDS (ref 11.6–14.5)
RBC # BLD AUTO: 5.72 MILLION/UL (ref 3.88–5.62)
RSV RNA RESP QL NAA+PROBE: NEGATIVE
SAO2 % BLD FROM PO2: 58 % (ref 60–85)
SARS-COV-2 RNA RESP QL NAA+PROBE: NEGATIVE
SODIUM BLD-SCNC: 130 MMOL/L (ref 136–145)
SODIUM SERPL-SCNC: 128 MMOL/L (ref 135–147)
SODIUM SERPL-SCNC: 132 MMOL/L (ref 135–147)
SP GR UR STRIP.AUTO: 1.01 (ref 1–1.03)
SPECIMEN SOURCE: ABNORMAL
UROBILINOGEN UR STRIP-ACNC: <2 MG/DL
WBC # BLD AUTO: 13.25 THOUSAND/UL (ref 4.31–10.16)

## 2023-03-06 RX ORDER — CHLORHEXIDINE GLUCONATE 0.12 MG/ML
15 RINSE ORAL EVERY 12 HOURS SCHEDULED
Status: DISCONTINUED | OUTPATIENT
Start: 2023-03-06 | End: 2023-03-08 | Stop reason: HOSPADM

## 2023-03-06 RX ORDER — ACETAMINOPHEN 325 MG/1
650 TABLET ORAL EVERY 6 HOURS PRN
Status: DISCONTINUED | OUTPATIENT
Start: 2023-03-06 | End: 2023-03-08 | Stop reason: HOSPADM

## 2023-03-06 RX ORDER — ONDANSETRON 2 MG/ML
4 INJECTION INTRAMUSCULAR; INTRAVENOUS EVERY 6 HOURS PRN
Status: DISCONTINUED | OUTPATIENT
Start: 2023-03-06 | End: 2023-03-08 | Stop reason: HOSPADM

## 2023-03-06 RX ORDER — ONDANSETRON 2 MG/ML
4 INJECTION INTRAMUSCULAR; INTRAVENOUS ONCE
Status: COMPLETED | OUTPATIENT
Start: 2023-03-06 | End: 2023-03-06

## 2023-03-06 RX ORDER — SODIUM CHLORIDE 9 MG/ML
3 INJECTION INTRAVENOUS
Status: DISCONTINUED | OUTPATIENT
Start: 2023-03-06 | End: 2023-03-08 | Stop reason: HOSPADM

## 2023-03-06 RX ORDER — SODIUM CHLORIDE 9 MG/ML
500 INJECTION, SOLUTION INTRAVENOUS CONTINUOUS
Status: DISPENSED | OUTPATIENT
Start: 2023-03-06 | End: 2023-03-06

## 2023-03-06 RX ORDER — DEXTROSE AND SODIUM CHLORIDE 5; .45 G/100ML; G/100ML
250 INJECTION, SOLUTION INTRAVENOUS CONTINUOUS
Status: DISCONTINUED | OUTPATIENT
Start: 2023-03-06 | End: 2023-03-07

## 2023-03-06 RX ORDER — SODIUM CHLORIDE 9 MG/ML
250 INJECTION, SOLUTION INTRAVENOUS CONTINUOUS
Status: DISCONTINUED | OUTPATIENT
Start: 2023-03-06 | End: 2023-03-07

## 2023-03-06 RX ADMIN — SODIUM CHLORIDE 500 ML/HR: 0.9 INJECTION, SOLUTION INTRAVENOUS at 21:50

## 2023-03-06 RX ADMIN — SODIUM CHLORIDE, SODIUM LACTATE, POTASSIUM CHLORIDE, AND CALCIUM CHLORIDE 1000 ML: .6; .31; .03; .02 INJECTION, SOLUTION INTRAVENOUS at 18:13

## 2023-03-06 RX ADMIN — ONDANSETRON 4 MG: 2 INJECTION INTRAMUSCULAR; INTRAVENOUS at 17:37

## 2023-03-06 RX ADMIN — SODIUM CHLORIDE, SODIUM LACTATE, POTASSIUM CHLORIDE, AND CALCIUM CHLORIDE 1000 ML: .6; .31; .03; .02 INJECTION, SOLUTION INTRAVENOUS at 16:39

## 2023-03-06 RX ADMIN — SODIUM CHLORIDE, SODIUM LACTATE, POTASSIUM CHLORIDE, AND CALCIUM CHLORIDE 1000 ML: .6; .31; .03; .02 INJECTION, SOLUTION INTRAVENOUS at 16:29

## 2023-03-06 RX ADMIN — DEXTROSE AND SODIUM CHLORIDE 250 ML/HR: 5; .45 INJECTION, SOLUTION INTRAVENOUS at 22:41

## 2023-03-06 RX ADMIN — SODIUM CHLORIDE 500 ML/HR: 0.9 INJECTION, SOLUTION INTRAVENOUS at 19:47

## 2023-03-06 RX ADMIN — SODIUM CHLORIDE 7.6 UNITS/HR: 9 INJECTION, SOLUTION INTRAVENOUS at 18:15

## 2023-03-06 RX ADMIN — SODIUM PHOSPHATE, MONOBASIC, MONOHYDRATE 30 MMOL: 276; 142 INJECTION, SOLUTION INTRAVENOUS at 22:21

## 2023-03-06 RX ADMIN — DEXTROSE AND SODIUM CHLORIDE 250 ML/HR: 5; .45 INJECTION, SOLUTION INTRAVENOUS at 19:27

## 2023-03-06 RX ADMIN — CHLORHEXIDINE GLUCONATE 15 ML: 1.2 RINSE ORAL at 22:19

## 2023-03-06 NOTE — ASSESSMENT & PLAN NOTE
· POA AEB tachycardia, tachypnea, and leukocytosis  · UA without evidence of infection  · CXR clear  · SIRS likely 2/2 DKA and not acute bacterial infection  · Monitor off abx  · Blood cultures pending  · Trend fever curve and WBC count

## 2023-03-06 NOTE — Clinical Note
Case was discussed with Barbie Martin and the patient's admission status was agreed to be Admission Status: inpatient status to the service of Dr Amy Alex

## 2023-03-06 NOTE — ASSESSMENT & PLAN NOTE
Lab Results   Component Value Date    HGBA1C 7 5 (H) 12/08/2022     · POA AEB pH 7 105, CO2 11, AG 25, , BHB 5 1  · Pt reports N/V and mild abdominal pain for the past 24 hours   States that his mother had a GI illness 2 days before  · Home insulin regimen: Novolog 6u at breakfast/lunch, 12 units at dinner, and Basaglar 22u q HS  · Pt monitors BG with Dexcom  · Start DKA protocol   · DKA fluids, transition to dextrose-containing IVF when BG < 250  · DKA insulin gtt until AG closed x2  · q1h BG   · q4h bmp, mag, phos and replace per protocol  · NPO  · Consider endocrine consult when DKA resolves

## 2023-03-06 NOTE — ASSESSMENT & PLAN NOTE
· Baseline creatinine 0 9-1 0  · Admission creatinine 1 8  · In the setting of DKA  · Continue IVF resuscitation per DKA protocol  · Trend bmp q4h  · Monitor I/O

## 2023-03-06 NOTE — ASSESSMENT & PLAN NOTE
· Admission K 5 9 in the setting of HARRIET and DKA  · Start DKA insulin gtt and IVF  · EKG without changes  · Trend bmp q4h  · Monitor on telemetry

## 2023-03-06 NOTE — ASSESSMENT & PLAN NOTE
· Initial lactic 7 7 in the setting of severe dehydration 2/2 DKA  · Continue IVF per DKA protocol  · Continue to trend until cleared

## 2023-03-06 NOTE — ED PROVIDER NOTES
History  Chief Complaint   Patient presents with   • Hyperglycemia - Symptomatic     Patient started last night with nausea, vomiting, diarrhea, and headache  Patient sent by [facility] for possible DKA  This is a 23 YOM with DMI who presents to the ED from urgent care for concern for DKA  Patient reports that his symptoms started last night and have included nausea, vomiting, and diarrhea  He also reports very mild abdominal pain  He states he has been able to keep any food down since this happened  He had blood glucose at urgent care earlier today of greater than 600, he does wear continuous glucose monitor and states that his blood sugars typically run around 100  Patient reports that his mother had what sounds like a viral GI bug a few days prior, she reports that her symptoms have since resolved  Patient has been hospitalized 1 time prior for DKA  Also admits to lightheadedness, states he feels "very thirsty "  He denies any recent fevers, chills, vision changes chest pain, SOB, chest pain, dysuria  Prior to Admission Medications   Prescriptions Last Dose Informant Patient Reported? Taking? Accu-Chek Softclix Lancets lancets   No No   Sig: Check blood sugars 4 times a day   Patient not taking: No sig reported   BD Pen Needle Renita 2nd Gen 32G X 4 MM MISC   No No   Sig: USE WITH INSULIN UP TO 5 TIMES DAILY AS DIRECTED   Kelseyaglar KwikPen 100 units/mL SOPN   No No   Sig: INJECT 0 22 ML (22 UNITS TOTAL) UNDER THE SKIN DAILY   Continuous Blood Gluc  (DEXCOM G6 ) SHAYNA   No No   Sig: Use  to scan blood sugars daily     Continuous Blood Gluc Sensor (Dexcom G6 Sensor) MISC   No No   Sig: CHANGE SENSOR EVERY 10 DAYS   Continuous Blood Gluc Transmit (Dexcom G6 Transmitter) MISC   No No   Sig: USE AS DIRECTED TO TEST BLOOD SUGARS   FREESTYLE LITE test strip   No No   Sig: Test blood sugars 4 times daily   Patient not taking: No sig reported   acetone, urine, test strip   No No   Si strip by Does not apply route as needed for high blood sugar   glucagon (GLUCAGON EMERGENCY) 1 MG injection   No No   Sig: Inject 1 mg under the skin once as needed for low blood sugar for up to 1 dose   glucose blood (Accu-Chek Guide) test strip   No No   Sig: Check blood sugars 4 times a day   Patient not taking: No sig reported   insulin aspart (NovoLOG FlexPen) 100 UNIT/ML injection pen   No No   Sig: INJECT 6 UNITS AT BREAKFAST AND LUNCH, 12 UNITS AT DINNER      Facility-Administered Medications: None       Past Medical History:   Diagnosis Date   • Diabetes mellitus ([facility] [address] )    • Fracture of phalanx of toe     Resolved 2016        History reviewed  No pertinent surgical history  Family History   Problem Relation Age of Onset   • Diabetes Family    • No Known Problems Mother    • No Known Problems Father    • No Known Problems Sister    • No Known Problems Brother    • No Known Problems Brother    • No Known Problems Brother    • No Known Problems Sister      I have reviewed and agree with the history as documented  E-Cigarette/Vaping   • E-Cigarette Use Never User      E-Cigarette/Vaping Substances   • Nicotine No    • THC No    • CBD No    • Flavoring No    • Other No    • Unknown No      Social History     Tobacco Use   • Smoking status: Never   • Smokeless tobacco: Never   Vaping Use   • Vaping Use: Never used   Substance Use Topics   • Alcohol use: Yes     Comment: social   • Drug use: No       Review of Systems   Constitutional: Negative for chills and fever  HENT: Negative for rhinorrhea and sore throat  Eyes: Negative for photophobia and visual disturbance  Respiratory: Negative for cough and shortness of breath  Cardiovascular: Negative for chest pain and palpitations  Gastrointestinal: Positive for abdominal pain, diarrhea, nausea and vomiting  Genitourinary: Negative for difficulty urinating and dysuria  Musculoskeletal: Negative for arthralgias     Skin: Negative for color change and pallor  Neurological: Positive for light-headedness and headaches (  Reports mild headache)  Negative for dizziness, syncope and weakness  Physical Exam  Physical Exam  Vitals and nursing note reviewed  Constitutional:       General: He is not in acute distress  Appearance: He is well-developed  HENT:      Head: Normocephalic and atraumatic  Mouth/Throat:      Mouth: Mucous membranes are dry  Eyes:      Conjunctiva/sclera: Conjunctivae normal    Cardiovascular:      Rate and Rhythm: Normal rate and regular rhythm  Heart sounds: No murmur heard  Pulmonary:      Effort: Pulmonary effort is normal  No respiratory distress  Breath sounds: Normal breath sounds  No wheezing, rhonchi or rales  Abdominal:      Palpations: Abdomen is soft  Tenderness: There is no abdominal tenderness  There is no right CVA tenderness or left CVA tenderness  Musculoskeletal:         General: No swelling  Cervical back: Neck supple  Skin:     General: Skin is warm and dry  Capillary Refill: Capillary refill takes less than 2 seconds  Neurological:      General: No focal deficit present  Mental Status: He is alert and oriented to person, place, and time     Psychiatric:         Mood and Affect: Mood normal          Vital Signs  ED Triage Vitals   Temperature Pulse Respirations Blood Pressure SpO2   03/06/23 1614 03/06/23 1611 03/06/23 1611 03/06/23 1611 03/06/23 1611   98 2 °F (36 8 °C) (!) 117 20 143/86 95 %      Temp Source Heart Rate Source Patient Position - Orthostatic VS BP Location FiO2 (%)   03/06/23 1940 03/06/23 1630 03/06/23 1630 03/06/23 1630 --   Oral Monitor Sitting Right arm       Pain Score       03/06/23 1611       5           Vitals:    03/06/23 1730 03/06/23 1800 03/06/23 1830 03/06/23 1940   BP: 137/64 132/72 136/68 132/61   Pulse: 92 95 96 100   Patient Position - Orthostatic VS: Sitting Sitting Sitting Lying         Visual Acuity      ED Medications  Medications   sodium chloride (PF) 0 9 % injection 3 mL (has no administration in time range)   insulin regular (HumuLIN R,NovoLIN R) 1 Units/mL in sodium chloride 0 9 % 100 mL infusion (3 8 Units/hr Intravenous Rate/Dose Change 3/6/23 2033)   sodium chloride 0 9 % infusion (250 mL/hr Intravenous Rate/Dose Change 3/6/23 2232)     Followed by   sodium chloride 0 9 % infusion (has no administration in time range)   dextrose 5 % and sodium chloride 0 45 % infusion (0 mL/hr Intravenous Hold 3/6/23 1945)   chlorhexidine (PERIDEX) 0 12 % oral rinse 15 mL (15 mL Mouth/Throat Given 3/6/23 2219)   ondansetron (ZOFRAN) injection 4 mg (has no administration in time range)   acetaminophen (TYLENOL) tablet 650 mg (has no administration in time range)   sodium phosphate 30 mmol in dextrose 5 % 250 mL Infusion (30 mmol Intravenous New Bag 3/6/23 2221)   lactated ringers bolus 1,000 mL (0 mL Intravenous Stopped 3/6/23 1729)   lactated ringers bolus 1,000 mL (0 mL Intravenous Stopped 3/6/23 1742)   ondansetron (ZOFRAN) injection 4 mg (4 mg Intravenous Given 3/6/23 1737)   lactated ringers bolus 1,000 mL (0 mL Intravenous Stopped 3/6/23 1913)       Diagnostic Studies  Results Reviewed     Procedure Component Value Units Date/Time    Basic metabolic panel [[id number]]  (Abnormal) Collected: 03/06/23 2009    Lab Status: Final result Specimen: Blood from Arm, Left Updated: 03/06/23 2148     Sodium 132 mmol/L      Potassium 5 0 mmol/L      Chloride 101 mmol/L      CO2 17 mmol/L      ANION GAP 14 mmol/L      BUN 31 mg/dL      Creatinine 1 25 mg/dL      Glucose 312 mg/dL      Calcium 9 0 mg/dL      eGFR 80 ml/min/1 73sq m     Narrative:      [place] guidelines for Chronic Kidney Disease (CKD):   •  Stage 1 with normal or high GFR (GFR > 90 mL/min/1 73 square meters)  •  Stage 2 Mild CKD (GFR = 60-89 mL/min/1 73 square meters)  •  Stage 3A Moderate CKD (GFR = 45-59 mL/min/1 73 square meters)  •  Stage 3B Moderate CKD (GFR = 30-44 mL/min/1 73 square meters)  •  Stage 4 Severe CKD (GFR = 15-29 mL/min/1 73 square meters)  •  Stage 5 End Stage CKD (GFR <15 mL/min/1 73 square meters)  Note: GFR calculation is accurate only with a steady state creatinine    Magnesium [[id number]]  (Normal) Collected: 03/06/23 2009    Lab Status: Final result Specimen: Blood from Arm, Left Updated: 03/06/23 2148     Magnesium 2 1 mg/dL     Phosphorus [[id number]]  (Abnormal) Collected: 03/06/23 2009    Lab Status: Final result Specimen: Blood from Arm, Left Updated: 03/06/23 2148     Phosphorus 2 0 mg/dL     HS Troponin 0hr (reflex protocol) [[id number]]  (Normal) Collected: 03/06/23 2009    Lab Status: Final result Specimen: Blood from Arm, Left Updated: 03/06/23 2039     hs TnI 0hr 7 ng/L     Hemoglobin A1C [[id number]] Collected: 03/06/23 1627    Lab Status: In process Specimen: Blood from Arm, Left Updated: 03/06/23 2029    Lactic acid 2 Hours [[id number]]  (Abnormal) Collected: 03/06/23 1917    Lab Status: Final result Specimen: Blood from Arm, Right Updated: 03/06/23 2007     LACTIC ACID 3 3 mmol/L     Narrative:      Result may be elevated if tourniquet was used during collection      Basic metabolic panel [[id number]]     Lab Status: No result Specimen: Blood     Magnesium [[id number]]     Lab Status: No result Specimen: Blood     Phosphorus [[id number]]     Lab Status: No result Specimen: Blood     Fingerstick Glucose (POCT) [[id number]]  (Abnormal) Collected: 03/06/23 1922    Lab Status: Final result Updated: 03/06/23 1923     POC Glucose 398 mg/dl     Fingerstick Glucose (POCT) [[id number]]  (Abnormal) Collected: 03/06/23 1854    Lab Status: Final result Updated: 03/06/23 1856     POC Glucose 453 mg/dl     Fingerstick Glucose (POCT) [[id number]]  (Abnormal) Collected: 03/06/23 1804    Lab Status: Final result Updated: 03/06/23 1806     POC Glucose 465 mg/dl     Lactic acid [[id number]]  (Abnormal) Collected: 03/06/23 2801    Lab Status: Final result Specimen: Blood from Arm, Left Updated: 03/06/23 1755     LACTIC ACID 7 7 mmol/L     Narrative:      Result may be elevated if tourniquet was used during collection      UA w Reflex to Microscopic w Reflex to Culture [[id number]]  (Abnormal) Collected: 03/06/23 1657    Lab Status: Final result Specimen: Urine, Clean Catch Updated: 03/06/23 1749     Color, UA Light Yellow     Clarity, UA Clear     Specific Gravity, UA 1 015     pH, UA 5 0     Leukocytes, UA Negative     Nitrite, UA Negative     Protein, UA Negative mg/dl      Glucose, UA 1000 (1%) mg/dl      Ketones, UA 80 (3+) mg/dl      Urobilinogen, UA <2 0 mg/dl      Bilirubin, UA Negative     Occult Blood, UA Negative    Comprehensive metabolic panel [[id number]]  (Abnormal) Collected: 03/06/23 1627    Lab Status: Final result Specimen: Blood from Arm, Left Updated: 03/06/23 1747     Sodium 128 mmol/L      Potassium 5 9 mmol/L      Chloride 92 mmol/L      CO2 11 mmol/L      ANION GAP 25 mmol/L      BUN 37 mg/dL      Creatinine 1 80 mg/dL      Glucose 608 mg/dL      Calcium 9 6 mg/dL      AST 20 U/L      ALT 26 U/L      Alkaline Phosphatase 79 U/L      Total Protein 8 0 g/dL      Albumin 5 1 g/dL      Total Bilirubin 0 69 mg/dL      eGFR 51 ml/min/1 73sq m     Narrative:      [place] guidelines for Chronic Kidney Disease (CKD):   •  Stage 1 with normal or high GFR (GFR > 90 mL/min/1 73 square meters)  •  Stage 2 Mild CKD (GFR = 60-89 mL/min/1 73 square meters)  •  Stage 3A Moderate CKD (GFR = 45-59 mL/min/1 73 square meters)  •  Stage 3B Moderate CKD (GFR = 30-44 mL/min/1 73 square meters)  •  Stage 4 Severe CKD (GFR = 15-29 mL/min/1 73 square meters)  •  Stage 5 End Stage CKD (GFR <15 mL/min/1 73 square meters)  Note: GFR calculation is accurate only with a steady state creatinine    Lipase [[id number]]  (Abnormal) Collected: 03/06/23 1627    Lab Status: Final result Specimen: Blood from Arm, Left Updated: 03/06/23 1743 Lipase 6 u/L     FLU/RSV/COVID - if FLU/RSV clinically relevant [[id number]]  (Normal) Collected: 03/06/23 1657    Lab Status: Final result Specimen: Nares from Nose Updated: 03/06/23 1740     SARS-CoV-2 Negative     INFLUENZA A PCR Negative     INFLUENZA B PCR Negative     RSV PCR Negative    Narrative:      FOR PEDIATRIC PATIENTS - copy/paste COVID Guidelines URL to browser: [email or web address]  [email or web address]    SARS-CoV-2 assay is a Nucleic Acid Amplification assay intended for the  qualitative detection of nucleic acid from SARS-CoV-2 in nasopharyngeal  swabs  Results are for the presumptive identification of SARS-CoV-2 RNA  Positive results are indicative of infection with SARS-CoV-2, the virus  causing COVID-19, but do not rule out bacterial infection or co-infection  with other viruses  Laboratories within the [place] and its  territories are required to report all positive results to the appropriate  public health authorities  Negative results do not preclude SARS-CoV-2  infection and should not be used as the sole basis for treatment or other  patient management decisions  Negative results must be combined with  clinical observations, patient history, and epidemiological information  This test has not been FDA cleared or approved  This test has been authorized by FDA under an Emergency Use Authorization  (EUA)  This test is only authorized for the duration of time the  declaration that circumstances exist justifying the authorization of the  emergency use of an in vitro diagnostic tests for detection of SARS-CoV-2  virus and/or diagnosis of COVID-19 infection under section 564(b)(1) of  the Act, 21 U  S C  775KOM-0(W)(9), unless the authorization is terminated  or revoked sooner  The test has been validated but independent review by FDA  and CLIA is pending  Test performed using [employer]:  This RT-PCR assay targets N2,  a region unique to SARS-CoV-2  A conserved region in the E-gene was chosen  for pan-Sarbecovirus detection which includes SARS-CoV-2  According to CMS-2020-01-R, this platform meets the definition of high-throughput technology  CBC and differential [[id number]]  (Abnormal) Collected: 03/06/23 1627    Lab Status: Final result Specimen: Blood from Arm, Left Updated: 03/06/23 1722     WBC 13 25 Thousand/uL      RBC 5 72 Million/uL      Hemoglobin 17 2 g/dL      Hematocrit 53 8 %      MCV 94 fL      MCH 30 1 pg      MCHC 32 0 g/dL      RDW 11 8 %      MPV 10 8 fL      Platelets 848 Thousands/uL      nRBC 0 /100 WBCs      Neutrophils Relative 90 %      Immat GRANS % 1 %      Lymphocytes Relative 4 %      Monocytes Relative 5 %      Eosinophils Relative 0 %      Basophils Relative 0 %      Neutrophils Absolute 12 09 Thousands/µL      Immature Grans Absolute 0 08 Thousand/uL      Lymphocytes Absolute 0 47 Thousands/µL      Monocytes Absolute 0 64 Thousand/µL      Eosinophils Absolute 0 00 Thousand/µL      Basophils Absolute 0 02 Thousands/µL     Narrative: This is an appended report  These results have been appended to a previously verified report      Procalcitonin [[id number]]  (Abnormal) Collected: 03/06/23 1627    Lab Status: Final result Specimen: Blood from Arm, Left Updated: 03/06/23 1700     Procalcitonin 5 88 ng/ml     APTT [[id number]]  (Normal) Collected: 03/06/23 1627    Lab Status: Final result Specimen: Blood from Arm, Left Updated: 03/06/23 1651     PTT 26 seconds     Protime-INR [[id number]]  (Abnormal) Collected: 03/06/23 1627    Lab Status: Final result Specimen: Blood from Arm, Left Updated: 03/06/23 1651     Protime 15 0 seconds      INR 1 10    POCT Blood Gas (CG8+) [[id number]]  (Abnormal) Collected: 03/06/23 1647    Lab Status: Final result Specimen: Venous Updated: 03/06/23 1650     ph, Chau ISTAT 7 105     pCO2, Chau i-STAT 43 0 mm HG      pO2, Chau i-STAT 40 0 mm HG      BE, i-STAT -16 mmol/L      HCO3, Chau i-STAT 13 5 mmol/L      CO2, i-STAT 15 mmol/L      O2 Sat, i-STAT 58 %      SODIUM, I-STAT 130 mmol/l      Potassium, i-STAT 6 0 mmol/L      Calcium, Ionized i-STAT 1 29 mmol/L      Hct, i-STAT 55 %      Hgb, i-STAT 18 7 g/dl      Glucose, i-STAT >600 mg/dl      Specimen Type VENOUS    Beta Hydroxybutyrate [[id number]]  (Abnormal) Collected: 03/06/23 1627    Lab Status: Final result Specimen: Blood from Arm, Left Updated: 03/06/23 1636     BETA-HYDROXYBUTYRATE 5 1 mmol/L     Blood culture #2 [[id number]] Collected: 03/06/23 1627    Lab Status: In process Specimen: Blood from Arm, Right Updated: 03/06/23 1633    Blood culture #1 [[id number]] Collected: 03/06/23 1627    Lab Status: In process Specimen: Blood from Arm, Left Updated: 03/06/23 1633    Fingerstick Glucose (POCT) [[id number]]  (Abnormal) Collected: 03/06/23 1615    Lab Status: Final result Updated: 03/06/23 1616     POC Glucose >500 mg/dl                  XR chest portable - 1 view   ED Interpretation by Jessa Swartz PA-C (03/06 1640)   ED Interpretation: No acute cardiopulmonary disease                   Procedures  ECG 12 Lead Documentation Only    Date/Time: 3/6/2023 6:30 PM  Performed by: Jessa Swartz PA-C  Authorized by: Jessa Swartz PA-C     Patient location:  ED  Rate:     ECG rate:  98    ECG rate assessment: normal    Rhythm:     Rhythm: sinus rhythm    Ectopy:     Ectopy: none    QRS:     QRS axis:  Normal  Conduction:     Conduction: normal    ST segments:     ST segments:  Normal  T waves:     T waves: normal    Other findings:     Other findings: LAE and MERCEDES    CriticalCare Time    Date/Time: 3/6/2023 7:00 PM  Performed by: Jessa Swartz PA-C  Authorized by: Jessa Swartz PA-C     Critical care provider statement:     Critical care time (minutes):  35    Critical care time was exclusive of:  Separately billable procedures and treating other patients and teaching time    Critical care was necessary to treat or prevent imminent or life-threatening deterioration of the following conditions:  Endocrine crisis and dehydration    Critical care was time spent personally by me on the following activities:  Blood draw for specimens, obtaining history from patient or surrogate, development of treatment plan with patient or surrogate, discussions with consultants, evaluation of patient's response to treatment, examination of patient, interpretation of cardiac output measurements, ordering and performing treatments and interventions, ordering and review of laboratory studies, re-evaluation of patient's condition and review of old charts    I assumed direction of critical care for this patient from another provider in my specialty: no               ED Course  ED Course as of 03/06/23 2247   Mon Mar 06, 2023   1757 Sodium(!): 128  Corrected Na+ 136  Medical Decision Making  Patient with DMI presents to the ED for evaluation of NVD that began yesterday  Patient reports vomiting approximately 6 times earlier today, does have a history of DKA  Point-of-care glucose greater than 600, patient tachycardic, VS otherwise unremarkable  Point-of-care i-STAT showed acidosis with pH of 7 1  Patient with elevated beta hydroxybutyrate of 5 1  Patient given 3L IV lactated ringer's in ED, insulin infusion started in ED, K repletion was withheld due to initial potassium of 5 9  CMP also was concerning for HARRIET with creatinine of 1 8, baseline creatinine appears to be 0 9  Patient also with elevated lactic acid of 7 7  Both likely secondary to severe dehydration, patient without significant abdominal pain or tenderness, no signs of flank tenderness  Bacterial infection unlikely at this time, abnormal labs likely secondary to severe dehydration/DKA  Discussed case with CC PELON Eastman who advised admission to stepdown 1 under Dr Pam Martinez  Amount and/or Complexity of Data Reviewed  Labs: ordered   Decision-making details documented in ED Course  Radiology: ordered and independent interpretation performed  Risk  Prescription drug management  Decision regarding hospitalization  Disposition  Final diagnoses:   Nausea and vomiting   DKA (diabetic ketoacidosis) ([place] [address] )   HARRIET (acute kidney injury) ([place] [address] )   Hyperkalemia     Time reflects when diagnosis was documented in both MDM as applicable and the Disposition within this note     Time User Action Codes Description Comment    3/6/2023  6:19 PM Xavi Rumpf Add [R11 2] Nausea and vomiting     3/6/2023  6:19 PM Xavi Rumpf Add [E11 10] DKA (diabetic ketoacidosis) ([place] [address] )     3/6/2023  6:19 PM Tempie Kayleigh [R11 2] Nausea and vomiting     3/6/2023  6:19 PM Tempie Kayleigh [E11 10] DKA (diabetic ketoacidosis) ([place] [address] )     3/6/2023  6:19 PM Xavi Rumpf Add [N17 9] HARRIET (acute kidney injury) ([place] [address] )     3/6/2023  6:19 PM Xavi Rumpf Add [E87 5] Hyperkalemia       ED Disposition     ED Disposition   Admit    Condition   Stable    Date/Time   Mon Mar 6, 2023  6:59 PM    Comment   Case was discussed with Kristen Tyler and the patient's admission status was agreed to be Admission Status: inpatient status to the service of Dr Claudean Lasso             Follow-up Information    None         Current Discharge Medication List      CONTINUE these medications which have NOT CHANGED    Details   Accu-Chek Softclix Lancets lancets Check blood sugars 4 times a day  Qty: 400 each, Refills: 2    Associated Diagnoses: Type 1 diabetes mellitus with hyperglycemia (HCC)      acetone, urine, test strip 1 strip by Does not apply route as needed for high blood sugar  Qty: 25 each, Refills: 4    Associated Diagnoses: Type 1 diabetes mellitus without complication (HCC)      Basaglar KwikPen 100 units/mL SOPN INJECT 0 22 ML (22 UNITS TOTAL) UNDER THE SKIN DAILY  Qty: 15 mL, Refills: 4    Associated Diagnoses: DM w/o complication type I (HCC)      BD Pen Needle Renita 2nd Gen 32G X 4 MM MISC USE WITH INSULIN UP TO 5 TIMES DAILY AS DIRECTED  Qty: 500 each, Refills: 3    Comments: DX Code Needed    Associated Diagnoses: Type 1 diabetes mellitus without complication (HCC)      Continuous Blood Gluc  (DEXCOM G6 ) SHAYNA Use  to scan blood sugars daily  Qty: 1 Device, Refills: 0    Associated Diagnoses: Type 1 diabetes mellitus without complication (HCC)      Continuous Blood Gluc Sensor (Dexcom G6 Sensor) MISC CHANGE SENSOR EVERY 10 DAYS  Qty: 3 each, Refills: 10    Comments: DX Code Needed    Associated Diagnoses: Type 1 diabetes mellitus without complication (HCC)      Continuous Blood Gluc Transmit (Dexcom G6 Transmitter) MISC USE AS DIRECTED TO TEST BLOOD SUGARS  Qty: 1 each, Refills: 3    Comments: DX Code Needed    Associated Diagnoses: Type 1 diabetes mellitus without complication ([address] )      ! ! FREESTYLE LITE test strip Test blood sugars 4 times daily  Qty: 400 each, Refills: 3    Associated Diagnoses: Type 1 diabetes mellitus with hyperglycemia (HCC)      glucagon (GLUCAGON EMERGENCY) 1 MG injection Inject 1 mg under the skin once as needed for low blood sugar for up to 1 dose  Qty: 1 each, Refills: 5    Associated Diagnoses: Type 1 diabetes mellitus without complication ([address] )      ! ! glucose blood (Accu-Chek Guide) test strip Check blood sugars 4 times a day  Qty: 400 each, Refills: 2    Associated Diagnoses: Type 1 diabetes mellitus with hyperglycemia (HCC)      insulin aspart (NovoLOG FlexPen) 100 UNIT/ML injection pen INJECT 6 UNITS AT BREAKFAST AND LUNCH, 12 UNITS AT DINNER  Qty: 15 mL, Refills: 4    Associated Diagnoses: Type 1 diabetes mellitus without complication ([facility] [address] )       ! ! - Potential duplicate medications found  Please discuss with provider  No discharge procedures on file      PDMP Review     None          ED Provider  Electronically Signed by           Benson Means PA-C  03/06/23 149 [address] PRERNA Chino  03/06/23 0023

## 2023-03-06 NOTE — ED ATTENDING ATTESTATION
3/6/2023  ISanto DO, saw and evaluated the patient  I have discussed the patient with the resident/non-physician practitioner and agree with the resident's/non-physician practitioner's findings, Plan of Care, and MDM as documented in the resident's/non-physician practitioner's note, except where noted  All available labs and Radiology studies were reviewed  I was present for key portions of any procedure(s) performed by the resident/non-physician practitioner and I was immediately available to provide assistance  At this point I agree with the current assessment done in the Emergency Department  I have conducted an independent evaluation of this patient a history and physical is as follows:    ED Course     23year-old diabetic presents with nausea and vomiting after mother had similar symptoms a couple days prior  Was seen in [facility] urgent care with sugar of 669 and sent to the emergency department for evaluation of DKA  Patient noted to be tachycardic and mildly tachypneic  No abdominal pain on my examination    Plan is for evaluation for DKA with fluid boluses while awaiting labs    Results for orders placed or performed during the hospital encounter of 03/06/23   FLU/RSV/COVID - if FLU/RSV clinically relevant    Specimen: Nose; Nares   Result Value Ref Range    SARS-CoV-2 Negative Negative    INFLUENZA A PCR Negative Negative    INFLUENZA B PCR Negative Negative    RSV PCR Negative Negative   Comprehensive metabolic panel   Result Value Ref Range    Sodium 128 (L) 135 - 147 mmol/L    Potassium 5 9 (H) 3 5 - 5 3 mmol/L    Chloride 92 (L) 96 - 108 mmol/L    CO2 11 (L) 21 - 32 mmol/L    ANION GAP 25 (H) 4 - 13 mmol/L    BUN 37 (H) 5 - 25 mg/dL    Creatinine 1 80 (H) 0 60 - 1 30 mg/dL    Glucose 608 (HH) 65 - 140 mg/dL    Calcium 9 6 8 4 - 10 2 mg/dL    AST 20 13 - 39 U/L    ALT 26 7 - 52 U/L    Alkaline Phosphatase 79 34 - 104 U/L    Total Protein 8 0 6 4 - 8 4 g/dL    Albumin 5 1 (H) 3 5 - 5 0 g/dL    Total Bilirubin 0 69 0 20 - 1 00 mg/dL    eGFR 51 ml/min/1 73sq m   Beta Hydroxybutyrate   Result Value Ref Range    BETA-HYDROXYBUTYRATE 5 1 (H) <0 6 mmol/L   Lipase   Result Value Ref Range    Lipase 6 (L) 11 - 82 u/L   UA w Reflex to Microscopic w Reflex to Culture    Specimen: Urine, Clean Catch   Result Value Ref Range    Color, UA Light Yellow     Clarity, UA Clear     Specific Gravity, UA 1 015 1 005 - 1 030    pH, UA 5 0 4 5, 5 0, 5 5, 6 0, 6 5, 7 0, 7 5, 8 0    Leukocytes, UA Negative Negative    Nitrite, UA Negative Negative    Protein, UA Negative Negative mg/dl    Glucose, UA 1000 (1%) (A) Negative mg/dl    Ketones, UA 80 (3+) (A) Negative mg/dl    Urobilinogen, UA <2 0 <2 0 mg/dl mg/dl    Bilirubin, UA Negative Negative    Occult Blood, UA Negative Negative   CBC and differential   Result Value Ref Range    WBC 13 25 (H) 4 31 - 10 16 Thousand/uL    RBC 5 72 (H) 3 88 - 5 62 Million/uL    Hemoglobin 17 2 (H) 12 0 - 17 0 g/dL    Hematocrit 53 8 (H) 36 5 - 49 3 %    MCV 94 82 - 98 fL    MCH 30 1 26 8 - 34 3 pg    MCHC 32 0 31 4 - 37 4 g/dL    RDW 11 8 11 6 - 15 1 %    MPV 10 8 8 9 - 12 7 fL    Platelets 801 546 - 999 Thousands/uL    nRBC 0 /100 WBCs    Neutrophils Relative 90 (H) 43 - 75 %    Immat GRANS % 1 0 - 2 %    Lymphocytes Relative 4 (L) 14 - 44 %    Monocytes Relative 5 4 - 12 %    Eosinophils Relative 0 0 - 6 %    Basophils Relative 0 0 - 1 %    Neutrophils Absolute 12 09 (H) 1 85 - 7 62 Thousands/µL    Immature Grans Absolute 0 08 0 00 - 0 20 Thousand/uL    Lymphocytes Absolute 0 47 (L) 0 60 - 4 47 Thousands/µL    Monocytes Absolute 0 64 0 17 - 1 22 Thousand/µL    Eosinophils Absolute 0 00 0 00 - 0 61 Thousand/µL    Basophils Absolute 0 02 0 00 - 0 10 Thousands/µL   Lactic acid   Result Value Ref Range    LACTIC ACID 7 7 (HH) 0 5 - 2 0 mmol/L   Procalcitonin   Result Value Ref Range    Procalcitonin 5 88 (H) <=0 25 ng/ml   Protime-INR   Result Value Ref Range    Protime 15 0 (H) 11 6 - 14 5 seconds    INR 1 10 0 84 - 1 19   APTT   Result Value Ref Range    PTT 26 23 - 37 seconds   HS Troponin 0hr (reflex protocol)   Result Value Ref Range    hs TnI 0hr 7 "Refer to ACS Flowchart"- see link ng/L   Lactic acid 2 Hours   Result Value Ref Range    LACTIC ACID 3 3 (HH) 0 5 - 2 0 mmol/L   Fingerstick Glucose (POCT)   Result Value Ref Range    POC Glucose >500 (HH) 65 - 140 mg/dl   POCT Blood Gas (CG8+)   Result Value Ref Range    ph, Chau ISTAT 7 105 (LL) 7 300 - 7 400    pCO2, Chau i-STAT 43 0 42 0 - 50 0 mm HG    pO2, Chau i-STAT 40 0 35 0 - 45 0 mm HG    BE, i-STAT -16 (L) -2 - 3 mmol/L    HCO3, Chau i-STAT 13 5 (LL) 24 0 - 30 0 mmol/L    CO2, i-STAT 15 (L) 21 - 32 mmol/L    O2 Sat, i-STAT 58 (L) 60 - 85 %    SODIUM, I-STAT 130 (L) 136 - 145 mmol/l    Potassium, i-STAT 6 0 (H) 3 5 - 5 3 mmol/L    Calcium, Ionized i-STAT 1 29 1 12 - 1 32 mmol/L    Hct, i-STAT 55 (H) 36 5 - 49 3 %    Hgb, i-STAT 18 7 (H) 12 0 - 17 0 g/dl    Glucose, i-STAT >600 (HH) 65 - 140 mg/dl    Specimen Type VENOUS    Fingerstick Glucose (POCT)   Result Value Ref Range    POC Glucose 465 (H) 65 - 140 mg/dl   Fingerstick Glucose (POCT)   Result Value Ref Range    POC Glucose 453 (H) 65 - 140 mg/dl   Fingerstick Glucose (POCT)   Result Value Ref Range    POC Glucose 398 (H) 65 - 140 mg/dl   Fingerstick Glucose (POCT)   Result Value Ref Range    POC Glucose 274 (H) 65 - 140 mg/dl   Fingerstick Glucose (POCT)   Result Value Ref Range    POC Glucose 288 (H) 65 - 140 mg/dl     Labs were reviewed and consistent with DKA  Plan for insulin drip at admission to stepdown unit critical care service    Critical Care Time 35 minutes for the treatment of DKA    My time is exclusive of teaching time or separately billable procedures  Procedures

## 2023-03-07 LAB
4HR DELTA HS TROPONIN: 2 NG/L
ANION GAP SERPL CALCULATED.3IONS-SCNC: 4 MMOL/L (ref 4–13)
ANION GAP SERPL CALCULATED.3IONS-SCNC: 6 MMOL/L (ref 4–13)
BUN SERPL-MCNC: 23 MG/DL (ref 5–25)
BUN SERPL-MCNC: 26 MG/DL (ref 5–25)
CALCIUM SERPL-MCNC: 7.8 MG/DL (ref 8.4–10.2)
CALCIUM SERPL-MCNC: 8 MG/DL (ref 8.4–10.2)
CARDIAC TROPONIN I PNL SERPL HS: 9 NG/L
CHLORIDE SERPL-SCNC: 107 MMOL/L (ref 96–108)
CHLORIDE SERPL-SCNC: 108 MMOL/L (ref 96–108)
CO2 SERPL-SCNC: 21 MMOL/L (ref 21–32)
CO2 SERPL-SCNC: 24 MMOL/L (ref 21–32)
CREAT SERPL-MCNC: 0.99 MG/DL (ref 0.6–1.3)
CREAT SERPL-MCNC: 1.03 MG/DL (ref 0.6–1.3)
ERYTHROCYTE [DISTWIDTH] IN BLOOD BY AUTOMATED COUNT: 11.9 % (ref 11.6–15.1)
EST. AVERAGE GLUCOSE BLD GHB EST-MCNC: 209 MG/DL
GFR SERPL CREATININE-BSD FRML MDRD: 101 ML/MIN/1.73SQ M
GFR SERPL CREATININE-BSD FRML MDRD: 106 ML/MIN/1.73SQ M
GLUCOSE SERPL-MCNC: 105 MG/DL (ref 65–140)
GLUCOSE SERPL-MCNC: 108 MG/DL (ref 65–140)
GLUCOSE SERPL-MCNC: 147 MG/DL (ref 65–140)
GLUCOSE SERPL-MCNC: 148 MG/DL (ref 65–140)
GLUCOSE SERPL-MCNC: 149 MG/DL (ref 65–140)
GLUCOSE SERPL-MCNC: 150 MG/DL (ref 65–140)
GLUCOSE SERPL-MCNC: 160 MG/DL (ref 65–140)
GLUCOSE SERPL-MCNC: 166 MG/DL (ref 65–140)
GLUCOSE SERPL-MCNC: 178 MG/DL (ref 65–140)
GLUCOSE SERPL-MCNC: 195 MG/DL (ref 65–140)
GLUCOSE SERPL-MCNC: 212 MG/DL (ref 65–140)
GLUCOSE SERPL-MCNC: 230 MG/DL (ref 65–140)
GLUCOSE SERPL-MCNC: 231 MG/DL (ref 65–140)
GLUCOSE SERPL-MCNC: 238 MG/DL (ref 65–140)
GLUCOSE SERPL-MCNC: 243 MG/DL (ref 65–140)
GLUCOSE SERPL-MCNC: 251 MG/DL (ref 65–140)
GLUCOSE SERPL-MCNC: 265 MG/DL (ref 65–140)
GLUCOSE SERPL-MCNC: 38 MG/DL (ref 65–140)
GLUCOSE SERPL-MCNC: 74 MG/DL (ref 65–140)
GLUCOSE SERPL-MCNC: 89 MG/DL (ref 65–140)
GLUCOSE SERPL-MCNC: 90 MG/DL (ref 65–140)
HBA1C MFR BLD: 8.9 %
HCT VFR BLD AUTO: 37.9 % (ref 36.5–49.3)
HGB BLD-MCNC: 12.9 G/DL (ref 12–17)
LACTATE SERPL-SCNC: 0.8 MMOL/L (ref 0.5–2)
MAGNESIUM SERPL-MCNC: 2 MG/DL (ref 1.9–2.7)
MAGNESIUM SERPL-MCNC: 2 MG/DL (ref 1.9–2.7)
MCH RBC QN AUTO: 30.4 PG (ref 26.8–34.3)
MCHC RBC AUTO-ENTMCNC: 34 G/DL (ref 31.4–37.4)
MCV RBC AUTO: 89 FL (ref 82–98)
PHOSPHATE SERPL-MCNC: 2.4 MG/DL (ref 2.7–4.5)
PHOSPHATE SERPL-MCNC: 2.6 MG/DL (ref 2.7–4.5)
PLATELET # BLD AUTO: 218 THOUSANDS/UL (ref 149–390)
PMV BLD AUTO: 10.2 FL (ref 8.9–12.7)
POTASSIUM SERPL-SCNC: 3.5 MMOL/L (ref 3.5–5.3)
POTASSIUM SERPL-SCNC: 4.5 MMOL/L (ref 3.5–5.3)
PROCALCITONIN SERPL-MCNC: 5.05 NG/ML
RBC # BLD AUTO: 4.25 MILLION/UL (ref 3.88–5.62)
SODIUM SERPL-SCNC: 134 MMOL/L (ref 135–147)
SODIUM SERPL-SCNC: 136 MMOL/L (ref 135–147)
WBC # BLD AUTO: 8.61 THOUSAND/UL (ref 4.31–10.16)

## 2023-03-07 RX ORDER — INSULIN LISPRO 100 [IU]/ML
1-6 INJECTION, SOLUTION INTRAVENOUS; SUBCUTANEOUS
Status: DISCONTINUED | OUTPATIENT
Start: 2023-03-08 | End: 2023-03-08

## 2023-03-07 RX ORDER — SODIUM CHLORIDE, SODIUM GLUCONATE, SODIUM ACETATE, POTASSIUM CHLORIDE, MAGNESIUM CHLORIDE, SODIUM PHOSPHATE, DIBASIC, AND POTASSIUM PHOSPHATE .53; .5; .37; .037; .03; .012; .00082 G/100ML; G/100ML; G/100ML; G/100ML; G/100ML; G/100ML; G/100ML
125 INJECTION, SOLUTION INTRAVENOUS CONTINUOUS
Status: DISCONTINUED | OUTPATIENT
Start: 2023-03-07 | End: 2023-03-07

## 2023-03-07 RX ORDER — INSULIN GLARGINE 100 [IU]/ML
22 INJECTION, SOLUTION SUBCUTANEOUS
Status: DISCONTINUED | OUTPATIENT
Start: 2023-03-07 | End: 2023-03-08

## 2023-03-07 RX ORDER — POTASSIUM CHLORIDE 20 MEQ/1
40 TABLET, EXTENDED RELEASE ORAL ONCE
Status: COMPLETED | OUTPATIENT
Start: 2023-03-07 | End: 2023-03-07

## 2023-03-07 RX ORDER — INSULIN LISPRO 100 [IU]/ML
1-5 INJECTION, SOLUTION INTRAVENOUS; SUBCUTANEOUS
Status: DISCONTINUED | OUTPATIENT
Start: 2023-03-08 | End: 2023-03-08 | Stop reason: HOSPADM

## 2023-03-07 RX ADMIN — CHLORHEXIDINE GLUCONATE 15 ML: 1.2 RINSE ORAL at 22:02

## 2023-03-07 RX ADMIN — INSULIN GLARGINE 22 UNITS: 100 INJECTION, SOLUTION SUBCUTANEOUS at 22:02

## 2023-03-07 RX ADMIN — Medication 1 SPRAY: at 12:58

## 2023-03-07 RX ADMIN — POTASSIUM CHLORIDE 40 MEQ: 1500 TABLET, EXTENDED RELEASE ORAL at 09:44

## 2023-03-07 RX ADMIN — DEXTROSE AND SODIUM CHLORIDE 250 ML/HR: 5; .45 INJECTION, SOLUTION INTRAVENOUS at 02:40

## 2023-03-07 RX ADMIN — SODIUM CHLORIDE 4 UNITS/HR: 9 INJECTION, SOLUTION INTRAVENOUS at 09:39

## 2023-03-07 RX ADMIN — CHLORHEXIDINE GLUCONATE 15 ML: 1.2 RINSE ORAL at 09:42

## 2023-03-07 RX ADMIN — SODIUM CHLORIDE, SODIUM GLUCONATE, SODIUM ACETATE, POTASSIUM CHLORIDE, MAGNESIUM CHLORIDE, SODIUM PHOSPHATE, DIBASIC, AND POTASSIUM PHOSPHATE 125 ML/HR: .53; .5; .37; .037; .03; .012; .00082 INJECTION, SOLUTION INTRAVENOUS at 06:34

## 2023-03-07 RX ADMIN — Medication 1 SPRAY: at 09:45

## 2023-03-07 RX ADMIN — SODIUM CHLORIDE 0.3 UNITS/HR: 9 INJECTION, SOLUTION INTRAVENOUS at 06:30

## 2023-03-07 NOTE — ASSESSMENT & PLAN NOTE
• Baseline creatinine 0 9-1 0  • Admission creatinine 1 8, improved to 1 03  • In the setting of DKA  • Continue IVF resuscitation per DKA protocol  • Trend bmp q4h  • Monitor I/O

## 2023-03-07 NOTE — PROGRESS NOTES
[place]  Progress Note - Jeimy Ferrera 2000, 23 y o  male MRN: [id number]  Unit/Bed#: -01 Encounter: [id number]  Primary Care Provider: Yasmeen Gregory DO   Date and time admitted to hospital: 3/6/2023  4:16 PM    * Type 1 diabetes mellitus with ketoacidosis without coma [facility])  Assessment & Plan    Lab Results   Component Value Date    HGBA1C 7 5 (H) 12/08/2022     · POA AEB pH 7 105, CO2 11, AG 25, , BHB 5 1  · Pt reports N/V and mild abdominal pain for the past 24 hours   States that his mother had a GI illness 2 days before  · Home insulin regimen: Novolog 6u at breakfast/lunch, 12 units at dinner, and Basaglar 22u q HS  · Pt monitors BG with Dexcom  · Continue DKA protocol   · AG closed x1, transition to non-DKA insulin when AG closed x2  · NPO- can start PO diet when AG closed x2    SIRS (systemic inflammatory response syndrome) (HCC)  Assessment & Plan  · POA AEB tachycardia, tachypnea, and leukocytosis  · UA without evidence of infection  · CXR clear  · SIRS likely 2/2 DKA and not acute bacterial infection  · Monitor off abx  · Blood cultures pending  · Trend fever curve and WBC count    Nausea & vomiting  Assessment & Plan  · Zofran PRN    Lactic acidosis  Assessment & Plan  · In the setting of severe dehydration 2/2 DKA  · Continue IVF per DKA protocol  · Continue to trend until cleared    Hyponatremia  Assessment & Plan  · Pseudohyponatremia 2/2 hyperglycemia  · Corrected Na 136  · Continue to trend    Hyperkalemia  Assessment & Plan  · Resolved  · Admission K 5 9 in the setting of HARRIET and DKA  · Start DKA insulin gtt and IVF  · EKG without changes  · Trend bmp q4h  · Monitor on telemetry    HARRIET (acute kidney injury) ([email or web address] 75 )  Assessment & Plan  · Baseline creatinine 0 9-1 0  · Admission creatinine 1 8, improved to 1 03  · In the setting of DKA  · Continue IVF resuscitation per DKA protocol  · Trend bmp q4h  · Monitor I/O      ----------------------------------------------------------------------------------------  HPI/24hr events: AG closed x1, hyperkalemia resolved  No acute events overnight  Patient appropriate for transfer out of the ICU today?: Patient does not meet criteria for referral to the ICU Follow-Up Clinic; referral has not been made  Disposition: Discharge to home when AG closed x2 and tolerating PO diet   Code Status: Level 1 - Full Code  ---------------------------------------------------------------------------------------  SUBJECTIVE  Pt feels significantly improved this morning  Denies any complaints  States he's feeling hungry and he's ready to eat  Review of Systems   Constitutional: Negative for activity change, appetite change, fatigue and fever  HENT: Negative for congestion and rhinorrhea  Respiratory: Negative for cough and shortness of breath  Cardiovascular: Negative for chest pain and palpitations  Gastrointestinal: Negative for abdominal distention, abdominal pain, constipation, diarrhea, nausea and vomiting  Neurological: Negative for dizziness, light-headedness and headaches  All other systems reviewed and are negative      Review of systems was reviewed and negative unless stated above in HPI/24-hour events   ---------------------------------------------------------------------------------------  OBJECTIVE    Vitals   Vitals:    23 1830 23 1915 23 1940 23 2350   BP: 136/68  132/61 137/60   BP Location: Right arm  Right arm Right arm   Pulse: 96  100 85   Resp: 20  20 14   Temp:   97 5 °F (36 4 °C) 98 5 °F (36 9 °C)   TempSrc:   Oral Oral   SpO2: 100%  99% 96%   Weight:  77 5 kg (170 lb 13 7 oz)     Height:         Temp (24hrs), Av 1 °F (36 7 °C), Min:97 5 °F (36 4 °C), Max:98 5 °F (36 9 °C)  Current: Temperature: 98 5 °F (36 9 °C)          Respiratory:  SpO2: SpO2: 96 %       Invasive/non-invasive ventilation settings   Respiratory    Lab Data (Last 4 hours)    None         O2/Vent Data (Last 4 hours)    None                Physical Exam  Vitals reviewed  Constitutional:       General: He is not in acute distress  Appearance: He is not ill-appearing, toxic-appearing or diaphoretic  HENT:      Head: Normocephalic and atraumatic  Nose: Nose normal       Mouth/Throat:      Mouth: Mucous membranes are moist    Eyes:      Extraocular Movements: Extraocular movements intact  Pupils: Pupils are equal, round, and reactive to light  Cardiovascular:      Rate and Rhythm: Normal rate and regular rhythm  Heart sounds: No murmur heard  No friction rub  No gallop  Pulmonary:      Effort: Pulmonary effort is normal       Breath sounds: Normal breath sounds  No wheezing, rhonchi or rales  Abdominal:      General: Abdomen is flat  There is no distension  Palpations: Abdomen is soft  Tenderness: There is no abdominal tenderness  There is no guarding or rebound  Musculoskeletal:      Right lower leg: No edema  Left lower leg: No edema  Skin:     General: Skin is warm and dry  Capillary Refill: Capillary refill takes less than 2 seconds  Neurological:      General: No focal deficit present  Mental Status: He is alert and oriented to person, place, and time  Cranial Nerves: No cranial nerve deficit  Sensory: No sensory deficit  Motor: No weakness        Coordination: Coordination normal    Psychiatric:         Mood and Affect: Mood normal          Behavior: Behavior normal            Laboratory and Diagnostics:  Results from last 7 days   Lab Units 03/06/23  1647 03/06/23  1627   WBC Thousand/uL  --  13 25*   HEMOGLOBIN g/dL  --  17 2*   I STAT HEMOGLOBIN g/dl 18 7*  --    HEMATOCRIT %  --  53 8*   HEMATOCRIT, ISTAT % 55*  --    PLATELETS Thousands/uL  --  316   NEUTROS PCT %  --  90*   MONOS PCT %  --  5     Results from last 7 days   Lab Units 03/07/23  0003 03/06/23 2009 03/06/23  1647 03/06/23  1627   SODIUM mmol/L 134* 132*  --  128*   POTASSIUM mmol/L 4 5 5 0  --  5 9*   CHLORIDE mmol/L 107 101  --  92*   CO2 mmol/L 21 17*  --  11*   CO2, I-STAT mmol/L  --   --  15*  --    ANION GAP mmol/L 6 14*  --  25*   BUN mg/dL 26* 31*  --  37*   CREATININE mg/dL 1 03 1 25  --  1 80*   CALCIUM mg/dL 8 0* 9 0  --  9 6   GLUCOSE RANDOM mg/dL 231* 312*  --  608*   ALT U/L  --   --   --  26   AST U/L  --   --   --  20   ALK PHOS U/L  --   --   --  79   ALBUMIN g/dL  --   --   --  5 1*   TOTAL BILIRUBIN mg/dL  --   --   --  0 69     Results from last 7 days   Lab Units 03/07/23 0003 03/06/23 2009   MAGNESIUM mg/dL 2 0 2 1   PHOSPHORUS mg/dL 2 6* 2 0*      Results from last 7 days   Lab Units 03/06/23  1627   INR  1 10   PTT seconds 26          Results from last 7 days   Lab Units 03/06/23 1917 03/06/23  1627   LACTIC ACID mmol/L 3 3* 7 7*     ABG:    VBG:    Results from last 7 days   Lab Units 03/06/23  1627   PROCALCITONIN ng/ml 5 88*       Micro  Results from last 7 days   Lab Units 03/06/23  1627   BLOOD CULTURE  Received in Microbiology Lab  Culture in Progress  Received in Microbiology Lab  Culture in Progress  EKG: NSR  Imaging: I have personally reviewed pertinent reports  Intake and Output  I/O       03/05 0701 03/06 0700 03/06 0701 03/07 0700    P  O   0    I V  (mL/kg)  2143 1 (27 7)    IV Piggyback  3068 8    Total Intake(mL/kg)  5211 9 (67 3)    Urine (mL/kg/hr)  0    Stool  0    Total Output  0    Net  +5211 9          Unmeasured Urine Occurrence  1 x    Unmeasured Stool Occurrence  0 x          Height and Weights   Height: 6' (182 9 cm)  IBW (Ideal Body Weight): 77 6 kg  Body mass index is 23 17 kg/m²    Weight (last 2 days)     Date/Time Weight    03/06/23 1915 77 5 (170 86)    03/06/23 1800 76 (167 55)            Nutrition       Diet Orders   (From admission, onward)             Start     Ordered    03/06/23 1914  Diet NPO  Diet effective now        References:    Nutrtion Support Algorithm Enteral vs  Parenteral   Question Answer Comment   Diet Type NPO    RD to adjust diet per protocol?  Yes        03/06/23 1914                  Active Medications  Scheduled Meds:  Current Facility-Administered Medications   Medication Dose Route Frequency Provider Last Rate   • acetaminophen  650 mg Oral Q6H PRN Senait Dozier PA-C     • chlorhexidine  15 [facility]at [facility]12[facility]     • dextrose 5 % and sodium chloride 0 45 %  250 mL/hr Intravenous Continuous Senait Dozier PA-C 250 mL/hr (03/06/23 2241)   • insulin regular (HumuLIN R,NovoLIN R) infusion  0 1-30 Units/hr Intravenous Continuous Phyllis New PA-C 3 8 Units/hr (03/06/23 2033)   • ondansetron  4 mg Intravenous Q6H PRN Senait Dozier PA-C     • sodium chloride (PF)  3 mL Intravenous Q1H PRN Phyllis New PA-C     • sodium chloride  250 mL/hr Intravenous Continuous Senait Dozier PA-C Stopped (03/06/23 2350)   • sodium phosphate  30 mmol Intravenous Once Senait Dozier PA-C 30 mmol (03/06/23 2221)     Continuous Infusions:  dextrose 5 % and sodium chloride 0 45 %, 250 mL/hr, Last Rate: 250 mL/hr (03/06/23 2241)  insulin regular (HumuLIN R,NovoLIN R) infusion, 0 1-30 Units/hr, Last Rate: 3 8 Units/hr (03/06/23 2033)  sodium chloride, 250 mL/hr, Last Rate: Stopped (03/06/23 2350)      PRN Meds:   acetaminophen, 650 mg, Q6H PRN  ondansetron, 4 mg, Q6H PRN  sodium chloride (PF), 3 mL, Q1H PRN        Invasive Devices Review  Invasive Devices     Peripheral Intravenous Line  Duration           Peripheral IV 03/06/23 Distal;Left;Upper;Ventral (anterior) Antecubital <1 day    Peripheral IV 03/06/23 Distal;Right;Upper;Ventral (anterior) Antecubital <1 day    Peripheral IV 03/06/23 Right;Ventral (anterior) Forearm <1 day              ---------------------------------------------------------------------------------------  Advance Directive and Living Will:      Power of :    POLST: ---------------------------------------------------------------------------------------  Care Time Delivered:   No Critical Care time spent       Mary Shook PA-C      Portions of the record may have been created with voice recognition software  Occasional wrong word or "sound a like" substitutions may have occurred due to the inherent limitations of voice recognition software    Read the chart carefully and recognize, using context, where substitutions have occurred

## 2023-03-07 NOTE — ASSESSMENT & PLAN NOTE
Lab Results   Component Value Date    HGBA1C 8 9 (H) 03/06/2023       Recent Labs     03/07/23  0619 03/07/23  0720 03/07/23  0758 03/07/23  0847   POCGLU 74 38* 90 149*       Blood Sugar Average: Last 72 hrs:  (P) 226 [id number]     • POA AEB pH 7 105, CO2 11, AG 25, , BHB 5 1  • Pt reports N/V and mild abdominal pain for the past 24 hours  States that his mother had a GI illness 2 days before  • Home insulin regimen: Novolog 6u at breakfast/lunch, 12 units at dinner, and Basaglar 22u q HS  ?  Pt monitors BG with Dexcom  • Transitioned to non-DKA insulin 3/7 AM with plans for home Lantus in PM           ? Transition to SSI insulin 3/8  • Initiate Carb Controlled diet  • Stable for discharge with plans to follow up with endocrinologist as outpatient

## 2023-03-07 NOTE — ASSESSMENT & PLAN NOTE
• In the setting of severe dehydration 2/2 DKA  • Continue IVF per DKA protocol  • Continue to trend until cleared

## 2023-03-07 NOTE — ASSESSMENT & PLAN NOTE
· Baseline creatinine 0 9-1 0  · Admission creatinine 1 8, improved to 1 03  · In the setting of DKA  · Continue IVF resuscitation per DKA protocol  · Trend bmp q4h  · Monitor I/O

## 2023-03-07 NOTE — ASSESSMENT & PLAN NOTE
Lab Results   Component Value Date    HGBA1C 7 5 (H) 12/08/2022     · POA AEB pH 7 105, CO2 11, AG 25, , BHB 5 1  · Pt reports N/V and mild abdominal pain for the past 24 hours   States that his mother had a GI illness 2 days before  · Home insulin regimen: Novolog 6u at breakfast/lunch, 12 units at dinner, and Basaglar 22u q HS  · Pt monitors BG with Dexcom  · Continue DKA protocol   · AG closed x1, transition to non-DKA insulin when AG closed x2  · NPO- can start PO diet when AG closed x2

## 2023-03-07 NOTE — QUICK NOTE
Provided an update to patient's mother, Toni Seth, at the bedside, [address] [place] [address] patient's current status and treatments  All questions answered  Ela Cervantes expressed understanding and appreciation       Kartik LairdDelaware Hospital for the Chronically Ill

## 2023-03-07 NOTE — ASSESSMENT & PLAN NOTE
· Resolved  · Admission K 5 9 in the setting of HARRIET and DKA  · Start DKA insulin gtt and IVF  · EKG without changes  · Trend bmp q4h  · Monitor on telemetry

## 2023-03-07 NOTE — ASSESSMENT & PLAN NOTE
• Zofran PRN  • Symptoms resolved, stable for discharge with plans to follow up with endocrinologist as outpatient

## 2023-03-07 NOTE — H&P
Manoj 2000, 23 y o  male MRN: [id number]  Unit/Bed#: RAMON Encounter: [id number]  Primary Care Provider: Mike Estrada DO   Date and time admitted to hospital: 3/6/2023  4:16 PM    * Type 1 diabetes mellitus with ketoacidosis without coma [facility])  Assessment & Plan    Lab Results   Component Value Date    HGBA1C 7 5 (H) 12/08/2022     · POA AEB pH 7 105, CO2 11, AG 25, , BHB 5 1  · Pt reports N/V and mild abdominal pain for the past 24 hours   States that his mother had a GI illness 2 days before  · Home insulin regimen: Novolog 6u at breakfast/lunch, 12 units at dinner, and Basaglar 22u q HS  · Pt monitors BG with Dexcom  · Start DKA protocol   · DKA fluids, transition to dextrose-containing IVF when BG < 250  · DKA insulin gtt until AG closed x2  · q1h BG   · q4h bmp, mag, phos and replace per protocol  · NPO  · Consider endocrine consult when DKA resolves    SIRS (systemic inflammatory response syndrome) (HCC)  Assessment & Plan  · POA AEB tachycardia, tachypnea, and leukocytosis  · UA without evidence of infection  · CXR clear  · SIRS likely 2/2 DKA and not acute bacterial infection  · Monitor off abx  · Blood cultures pending  · Trend fever curve and WBC count    Nausea & vomiting  Assessment & Plan  · Zofran PRN    Lactic acidosis  Assessment & Plan  · Initial lactic 7 7 in the setting of severe dehydration 2/2 DKA  · Continue IVF per DKA protocol  · Continue to trend until cleared    Hyponatremia  Assessment & Plan  · Pseudohyponatremia 2/2 hyperglycemia  · Corrected Na 136  · Continue to trend    Hyperkalemia  Assessment & Plan  · Admission K 5 9 in the setting of HARRIET and DKA  · Start DKA insulin gtt and IVF  · EKG without changes  · Trend bmp q4h  · Monitor on telemetry    [facility]  Assessment & Plan  · Baseline creatinine 0 9-1 0  · Admission creatinine 1 8  · In the setting of DKA  · Continue IVF resuscitation per DKA protocol  · Trend bmp q4h  · Monitor I/O    -------------------------------------------------------------------------------------------------------------  Chief Complaint: "I was so nauseous yesterday and my blood sugar was really high at urgent care "    History of Present Illness   Christine Espinal is a 23 y o  male with PMHx significant for T1DM (diagnosed Nov 2018) who presented to the ED from urgent care with hyperglycemia  The patient reports 24 hours of nausea, vomiting, and mild abdominal pain after his mom was recently ill with the same symptoms  He went to urgent care and his BG was >600 so he was referred to the emergency room  In the ED he was found to have DKA AEB pH 7 105, CO2 11, AG 25, , BHB 5 1 and urine with 3+ ketones  He was given 3 L of LR and started on DKA insulin gtt  He will be admitted SD1 for further management of DKA  History obtained from the patient   -------------------------------------------------------------------------------------------------------------  Dispo: Admit to Stepdown Level 1    Code Status: Level 1 - Full Code  --------------------------------------------------------------------------------------------------------------  Review of Systems   Constitutional: Negative for chills and fever  HENT: Negative for congestion and sore throat  Respiratory: Negative for cough  Cardiovascular: Negative for chest pain and palpitations  Gastrointestinal: Positive for abdominal pain, diarrhea, nausea and vomiting  Negative for abdominal distention, blood in stool and constipation  Endocrine: Positive for polydipsia and polyuria  Genitourinary: Negative for dysuria  Neurological: Negative for dizziness, weakness, light-headedness and numbness  All other systems reviewed and are negative  A 12-point, complete review of systems was reviewed and negative except as stated above     Physical Exam  Vitals reviewed     Constitutional:       General: He is not in acute distress  Appearance: He is not ill-appearing, toxic-appearing or diaphoretic  HENT:      Head: Normocephalic and atraumatic  Nose: Nose normal       Mouth/Throat:      Mouth: Mucous membranes are dry  Eyes:      Extraocular Movements: Extraocular movements intact  Pupils: Pupils are equal, round, and reactive to light  Cardiovascular:      Rate and Rhythm: Regular rhythm  Tachycardia present  Heart sounds: No murmur heard  No friction rub  No gallop  Pulmonary:      Breath sounds: No wheezing, rhonchi or rales  Abdominal:      General: Abdomen is flat  There is no distension  Palpations: Abdomen is soft  Tenderness: There is no abdominal tenderness  There is no guarding or rebound  Musculoskeletal:      Right lower leg: No edema  Left lower leg: No edema  Skin:     General: Skin is warm and dry  Capillary Refill: Capillary refill takes 2 to 3 seconds  Neurological:      General: No focal deficit present  Mental Status: He is alert  Mental status is at baseline  He is disoriented  Cranial Nerves: No cranial nerve deficit  Sensory: No sensory deficit  Psychiatric:         Mood and Affect: Mood normal          Behavior: Behavior normal        --------------------------------------------------------------------------------------------------------------  Vitals:   Vitals:    03/06/23 1700 03/06/23 1730 03/06/23 1800 03/06/23 1830   BP: 157/68 137/64 132/72 136/68   BP Location: Right arm Right arm Right arm Right arm   Pulse: 101 92 95 96   Resp: 18 18 21 20   Temp:       SpO2: 100% 99% 99% 100%   Weight:   76 kg (167 lb 8 8 oz)    Height:         Temp  Min: 98 2 °F (36 8 °C)  Max: 98 2 °F (36 8 °C)  IBW (Ideal Body Weight): 77 6 kg  Height: 6' (182 9 cm)  Body mass index is 22 72 kg/m²      Laboratory and Diagnostics:  Results from last 7 days   Lab Units 03/06/23  1647 03/06/23  1627   WBC Thousand/uL  --  13 25*   HEMOGLOBIN g/dL  -- 17 2*   I STAT HEMOGLOBIN g/dl 18 7*  --    HEMATOCRIT %  --  53 8*   HEMATOCRIT, ISTAT % 55*  --    PLATELETS Thousands/uL  --  316   NEUTROS PCT %  --  90*   MONOS PCT %  --  5     Results from last 7 days   Lab Units 03/06/23  1647 03/06/23  1627   SODIUM mmol/L  --  128*   POTASSIUM mmol/L  --  5 9*   CHLORIDE mmol/L  --  92*   CO2 mmol/L  --  11*   CO2, I-STAT mmol/L 15*  --    ANION GAP mmol/L  --  25*   BUN mg/dL  --  37*   CREATININE mg/dL  --  1 80*   CALCIUM mg/dL  --  9 6   GLUCOSE RANDOM mg/dL  --  608*   ALT U/L  --  26   AST U/L  --  20   ALK PHOS U/L  --  79   ALBUMIN g/dL  --  5 1*   TOTAL BILIRUBIN mg/dL  --  0 69          Results from last 7 days   Lab Units 03/06/23  1627   INR  1 10   PTT seconds 26          Results from last 7 days   Lab Units 03/06/23  1627   LACTIC ACID mmol/L 7 7*     ABG:    VBG:    Results from last 7 days   Lab Units 03/06/23  1627   PROCALCITONIN ng/ml 5 88*       Micro:        EKG: Sinus tachycardia  Imaging: I have personally reviewed pertinent reports  Historical Information   Past Medical History:   Diagnosis Date   • Diabetes mellitus ([facility] [address] [facility] )    • Fracture of phalanx of toe     Resolved 2/1/2016      History reviewed  No pertinent surgical history    Social History   Social History     Substance and Sexual Activity   Alcohol Use Yes    Comment: social     Social History     Substance and Sexual Activity   Drug Use No     Social History     Tobacco Use   Smoking Status Never   Smokeless Tobacco Never   Family History:   Family History   Problem Relation Age of Onset   • Diabetes Family    • No Known Problems Mother    • No Known Problems Father    • No Known Problems Sister    • No Known Problems Brother    • No Known Problems Brother    • No Known Problems Brother    • No Known Problems Sister      I have reviewed this patient's family history and commented on sigificant items within the HPI      Medications:  Current Facility-Administered Medications Medication Dose Route Frequency   • acetaminophen (TYLENOL) tablet 650 mg  650 mg Oral Q6H PRN   • chlorhexidine (PERIDEX) 0 12 % oral rinse 15 mL  15 mL Mouth/Throat Q12H [address]   • dextrose 5 % and sodium chloride 0 45 % infusion  250 mL/hr Intravenous Continuous   • insulin regular (HumuLIN R,NovoLIN R) 1 Units/mL in sodium chloride 0 9 % 100 mL infusion  0 1-30 Units/hr Intravenous Continuous   • ondansetron (ZOFRAN) injection 4 mg  4 mg Intravenous Q6H PRN   • sodium chloride (PF) 0 9 % injection 3 mL  3 mL Intravenous Q1H PRN   • sodium chloride 0 9 % infusion  500 mL/hr Intravenous Continuous    Followed by   • sodium chloride 0 9 % infusion  250 mL/hr Intravenous Continuous     Home medications:  Prior to Admission Medications   Prescriptions Last Dose Informant Patient Reported? Taking? Accu-Chek Softclix Lancets lancets   No No   Sig: Check blood sugars 4 times a day   Patient not taking: No sig reported   BD Pen Needle Renita 2nd Gen 32G X 4 MM MISC   No No   Sig: USE WITH INSULIN UP TO 5 TIMES DAILY AS DIRECTED   Basaglar KwikPen 100 units/mL SOPN   No No   Sig: INJECT 0 22 ML (22 UNITS TOTAL) UNDER THE SKIN DAILY   Continuous Blood Gluc  (DEXCOM G6 ) SHAYNA   No No   Sig: Use  to scan blood sugars daily     Continuous Blood Gluc Sensor (Dexcom G6 Sensor) MISC   No No   Sig: CHANGE SENSOR EVERY 10 DAYS   Continuous Blood Gluc Transmit (Dexcom G6 Transmitter) MISC   No No   Sig: USE AS DIRECTED TO TEST BLOOD SUGARS   FREESTYLE LITE test strip   No No   Sig: Test blood sugars 4 times daily   Patient not taking: No sig reported   acetone, urine, test strip   No No   Si strip by Does not apply route as needed for high blood sugar   glucagon (GLUCAGON EMERGENCY) 1 MG injection   No No   Sig: Inject 1 mg under the skin once as needed for low blood sugar for up to 1 dose   glucose blood (Accu-Chek Guide) test strip   No No   Sig: Check blood sugars 4 times a day   Patient not taking: No sig reported   insulin aspart (NovoLOG FlexPen) 100 UNIT/ML injection pen   No No   Sig: INJECT 6 UNITS AT BREAKFAST AND LUNCH, 12 UNITS AT DINNER      Facility-Administered Medications: None     Allergies:  No Known Allergies    ------------------------------------------------------------------------------------------------------------  Advance Directive and Living Will:      Power of :    POLST:    ------------------------------------------------------------------------------------------------------------  Anticipated Length of Stay is > 2 midnights    Care Time Delivered:   No Critical Care time spent       Clearjuan Yang PA-C        Portions of the record may have been created with voice recognition software  Occasional wrong word or "sound a like" substitutions may have occurred due to the inherent limitations of voice recognition software    Read the chart carefully and recognize, using context, where substitutions have occurred

## 2023-03-07 NOTE — ED NOTES
Insulin continues to infuse at initial ordered dose (7 6U/hr)  pt transported to ICU (most recent HS=113 representing a BS drop of 68  Per admin parameters insulin drip should be doubled as pt still > 250 with a drop of <70)  This RN transported pt to ICU to further seek instruction from ICU provider Sandra Fitzpatrick) regarding increasing this pts insulin infusion  Provider currently not bedside but Kamila Singer (RN assuming care) will clarify with provider where to go next with insulin pump infusion rate        Denys Lange RN  03/06/23 1926

## 2023-03-07 NOTE — ASSESSMENT & PLAN NOTE
· In the setting of severe dehydration 2/2 DKA  · Continue IVF per DKA protocol  · Continue to trend until cleared

## 2023-03-07 NOTE — PLAN OF CARE
Problem: Nutrition/Hydration-ADULT  Goal: Nutrient/Hydration intake appropriate for improving, restoring or maintaining nutritional needs  Description: Monitor and assess patient's nutrition/hydration status for malnutrition  Collaborate with interdisciplinary team and initiate plan and interventions as ordered  Monitor patient's weight and dietary intake as ordered or per policy  Utilize nutrition screening tool and intervene as necessary  Determine patient's food preferences and provide high-protein, high-caloric foods as appropriate       INTERVENTIONS:  - Monitor oral intake, urinary output, labs, and treatment plans  - Assess nutrition and hydration status and recommend course of action  - Evaluate amount of meals eaten  - Assist patient with eating if necessary   - Allow adequate time for meals  - Recommend/ encourage appropriate diets, oral nutritional supplements, and vitamin/mineral supplements  - Order, calculate, and assess calorie counts as needed  - Recommend, monitor, and adjust tube feedings and TPN/PPN based on assessed needs  - Assess need for intravenous fluids  - Provide specific nutrition/hydration education as appropriate  - Include patient/family/caregiver in decisions related to nutrition  3/6/2023 2333 by Salvador Power RN  Outcome: Progressing  3/6/2023 2332 by Salvador Power RN  Outcome: Progressing     Problem: METABOLIC, FLUID AND ELECTROLYTES - ADULT  Goal: Electrolytes maintained within normal limits  Description: INTERVENTIONS:  - Monitor labs and assess patient for signs and symptoms of electrolyte imbalances  - Administer electrolyte replacement as ordered  - Monitor response to electrolyte replacements, including repeat lab results as appropriate  - Instruct patient on fluid and nutrition as appropriate  Outcome: Progressing  Goal: Fluid balance maintained  Description: INTERVENTIONS:  - Monitor labs   - Monitor I/O and WT  - Instruct patient on fluid and nutrition as appropriate  - Assess for signs & symptoms of volume excess or deficit  Outcome: Progressing  Goal: Glucose maintained within target range  Description: INTERVENTIONS:  - Monitor Blood Glucose as ordered  - Assess for signs and symptoms of hyperglycemia and hypoglycemia  - Administer ordered medications to maintain glucose within target range  - Assess nutritional intake and initiate nutrition service referral as needed  Outcome: Progressing

## 2023-03-07 NOTE — UTILIZATION REVIEW
Initial Clinical Review    Admission: Date/Time/Statement:   Admission Orders (From admission, onward)     Ordered        03/06/23 1900  INPATIENT ADMISSION  Once                      Orders Placed This Encounter   Procedures   • INPATIENT ADMISSION     Standing Status:   Standing     Number of Occurrences:   1     Order Specific Question:   Level of Care     Answer:   Level 1 Stepdown [13]     Order Specific Question:   Estimated length of stay     Answer:   More than 2 Midnights     Order Specific Question:   Certification     Answer:   I certify that inpatient services are medically necessary for this patient for a duration of greater than two midnights  See H&P and MD Progress Notes for additional information about the patient's course of treatment  ED Arrival Information     Expected   -    Arrival   3/6/2023 16:09    Acuity   Emergent            Means of arrival   Walk-In    Escorted by   Family Member    Service   Hospitalist    Admission type   Emergency            Arrival complaint   Sent by Pt  First pos Ketoacidosis           Chief Complaint   Patient presents with   • Hyperglycemia - Symptomatic     Patient started last night with nausea, vomiting, diarrhea, and headache  Patient sent by patient first for possible DKA  Initial Presentation: 23 y o  male from home to ED, per urgent care,  admitted inpatient due to DKA with type 1 DM/SIRS/HARRIET  Presented due to nausea, vomiting and diarrhea starting night prior to arrival  + abdominal pain, lightheaded, thirsty  Unable to tolerate po  At Urgent care glucose > 600  On exam mucous membranes dry  Wbc 13 25   H&H 17 2/53  8  Procalcitonin 5 88  Venous gas 7 105,  HCO3 13 5  Beta-hydroxybutyrate 5 1  Glucose 608  Na 128  K 5 9  CO2-11  Anion gap 25  Bun 37, creatinine 1 80 and baseline of 0 9  Lactic acid 7 7  In the ED given 3 liters IV LR, insulin infusion started    Plan is continue DKA insulin gtt until AG closed x 2, hourly glucose, every 4 hours bmp, mag, phos and replete as needed  IVF resuscitation  Transition to dextrose containing when glucose < 250  Antiemetics as needed  Date: 3/7/23    Day 2:  Hungry  Exam non focal    Wbc 8 61   H&H 12 9/37 9  Glucose 150  Anion gap closed x 2  Continue IV insulin gtt, transition to Lantus and Humalog  Dc IVF  Diet advanced  ED Triage Vitals   Temperature Pulse Respirations Blood Pressure SpO2   03/06/23 1614 03/06/23 1611 03/06/23 1611 03/06/23 1611 03/06/23 1611   98 2 °F (36 8 °C) (!) 117 20 143/86 95 %      Temp Source Heart Rate Source Patient Position - Orthostatic VS BP Location FiO2 (%)   03/06/23 1940 03/06/23 1630 03/06/23 1630 03/06/23 1630 --   Oral Monitor Sitting Right arm       Pain Score       03/06/23 1611       5          Wt Readings from Last 1 Encounters:   03/07/23 79 8 kg (175 lb 14 8 oz)     Additional Vital Signs:   03/07/23 0735 98 1 °F (36 7 °C) 88 16 112/56 76 96 % None (Room air) Lying   03/07/23 0700 -- 78 16 -- -- 96 % -- --   03/07/23 0600 -- 79 13 -- -- 96 % -- --   03/07/23 0445 98 7 °F (37 1 °C) 80 16 123/58 83 97 % -- Lying   03/07/23 0200 -- 82 14 -- -- 94 % -- --   03/07/23 0100 -- 85 16 -- -- 96 % -- --   03/07/23 0000 -- 88 16 -- -- 96 % -- --   03/06/23 2350 98 5 °F (36 9 °C) 85 14 137/60 87 96 % None (Room air) Lying   03/06/23 2300 -- 85 15 -- -- 95 % -- --   03/06/23 2200 -- 86 15 -- -- 97 % -- --   03/06/23 2100 -- 95 17 -- -- 98 % -- --   03/06/23 2000 -- 95 -- -- -- 99 % -- --   03/06/23 1940 97 5 °F (36 4 °C) 100 20 132/61 88 99 % None (Room air)      Pertinent Labs/Diagnostic Test Results:   XR chest portable - 1 view   ED Interpretation by Ilya Palacios PA-C (03/06 1640)   ED Interpretation: No acute cardiopulmonary disease  Final Result by Sue Perez MD (03/07 1010)      No acute cardiopulmonary disease                    Workstation performed: [id number]           3/6/23 ecg Rhythm: sinus rhythm     Ectopy:   Ectopy: none     QRS:     QRS axis:  Normal   Conduction:     Conduction: normal     ST segments:     ST segments:  Normal   T waves:     T waves: normal     Other findings:     Other findings: LAE and MERCEDES     Results from last 7 days   Lab Units 03/06/23  1657   SARS-COV-2  Negative     Results from last 7 days   Lab Units 03/07/23  0452 03/06/23  1647 03/06/23  1627   WBC Thousand/uL 8 61  --  13 25*   HEMOGLOBIN g/dL 12 9  --  17 2*   I STAT HEMOGLOBIN g/dl  --  18 7*  --    HEMATOCRIT % 37 9  --  53 8*   HEMATOCRIT, ISTAT %  --  55*  --    PLATELETS Thousands/uL 218  --  316   NEUTROS ABS Thousands/µL  --   --  12 09*         Results from last 7 days   Lab Units 03/07/23  0452 03/07/23  0003 03/06/23  2009 03/06/23  1647 03/06/23  1627   SODIUM mmol/L 136 134* 132*  --  128*   POTASSIUM mmol/L 3 5 4 5 5 0  --  5 9*   CHLORIDE mmol/L 108 107 101  --  92*   CO2 mmol/L 24 21 17*  --  11*   CO2, I-STAT mmol/L  --   --   --  15*  --    ANION GAP mmol/L 4 6 14*  --  25*   BUN mg/dL 23 26* 31*  --  37*   CREATININE mg/dL 0 99 1 03 1 25  --  1 80*   EGFR ml/min/1 73sq m 106 101 80  --  51   CALCIUM mg/dL 7 8* 8 0* 9 0  --  9 6   CALCIUM, IONIZED, ISTAT mmol/L  --   --   --  1 29  --    MAGNESIUM mg/dL 2 0 2 0 2 1  --   --    PHOSPHORUS mg/dL 2 4* 2 6* 2 0*  --   --      Results from last 7 days   Lab Units 03/06/23  1627   AST U/L 20   ALT U/L 26   ALK PHOS U/L 79   TOTAL PROTEIN g/dL 8 0   ALBUMIN g/dL 5 1*   TOTAL BILIRUBIN mg/dL 0 69     Results from last 7 days   Lab Units 03/07/23  0938 03/07/23  0847 03/07/23  0758 03/07/23  0720 03/07/23  7985 03/07/23  0528 03/07/23  0438 03/07/23  0314 03/07/23  0214 03/07/23  0124 03/07/23  0048 03/06/23  2351   POC GLUCOSE mg/dl 243* 149* 90 38* 74 105 148* 212* 230* 265* 238* 221*     Results from last 7 days   Lab Units 03/07/23  0452 03/07/23  0003 03/06/23  2009 03/06/23  1627   GLUCOSE RANDOM mg/dL 150* 231* 312* 608*     Results from last 7 days   Lab Units 03/06/23  1627   HEMOGLOBIN A1C % 8 9*   EAG mg/dl 209     BETA-HYDROXYBUTYRATE   Date Value Ref Range Status   03/06/2023 5 1 (H) <0 6 mmol/L Final      Results from last 7 days   Lab Units 03/06/23  1647   PH, ESSENCE I-STAT  7 105*   PCO2, ESSENCE ISTAT mm HG 43 0   PO2, ESSENCE ISTAT mm HG 40 0   HCO3, ESSENCE ISTAT mmol/L 13 5*   I STAT BASE EXC mmol/L -16*   I STAT O2 SAT % 58*     Results from last 7 days   Lab Units 03/07/23  0003 03/06/23  2306 03/06/23 2009   HS TNI 0HR ng/L  --   --  7   HS TNI 2HR ng/L  --  9  --    HSTNI D2 ng/L  --  2  --    HS TNI 4HR ng/L 9  --   --    HSTNI D4 ng/L 2  --   --      Results from last 7 days   Lab Units 03/06/23  1627   PROTIME seconds 15 0*   INR  1 10   PTT seconds 26     Results from last 7 days   Lab Units 03/06/23  1627   PROCALCITONIN ng/ml 5 88*     Results from last 7 days   Lab Units 03/07/23  0452 03/06/23  1917 03/06/23  1627   LACTIC ACID mmol/L 0 8 3 3* 7 7*     Results from last 7 days   Lab Units 03/06/23  1627   LIPASE u/L 6*     Results from last 7 days   Lab Units 03/06/23  1657   CLARITY UA  Clear   COLOR UA  Light Yellow   SPEC GRAV UA  1 015   PH UA  5 0   GLUCOSE UA mg/dl 1000 (1%)*   KETONES UA mg/dl 80 (3+)*   BLOOD UA  Negative   PROTEIN UA mg/dl Negative   NITRITE UA  Negative   BILIRUBIN UA  Negative   UROBILINOGEN UA (BE) mg/dl <2 0   LEUKOCYTES UA  Negative     Results from last 7 days   Lab Units 03/06/23  1657   INFLUENZA A PCR  Negative   INFLUENZA B PCR  Negative   RSV PCR  Negative     Results from last 7 days   Lab Units 03/06/23  1627   BLOOD CULTURE  Received in Microbiology Lab  Culture in Progress  Received in Microbiology Lab  Culture in Progress         ED Treatment:   Medication Administration from 03/06/2023 1608 to 03/06/2023 1938       Date/Time Order Dose Route Action Comments     03/06/2023 1629 EST lactated ringers bolus 1,000 mL 1,000 mL Intravenous New Bag --     03/06/2023 1639 EST lactated ringers bolus 1,000 mL 1,000 mL Intravenous New Bag --     03/06/2023 1737 EST ondansetron (ZOFRAN) injection 4 mg 4 mg Intravenous Given --     03/06/2023 1813 EST lactated ringers bolus 1,000 mL 1,000 mL Intravenous New Bag --     03/06/2023 1815 EST insulin regular (HumuLIN R,NovoLIN R) 1 Units/mL in sodium chloride 0 9 % 100 mL infusion 7 6 Units/hr Intravenous New Bag --     03/06/2023 1927 EST dextrose 5 % and sodium chloride 0 45 % infusion 250 mL/hr Intravenous New Bag --        Past Medical History:   Diagnosis Date   • Diabetes mellitus ([facility] )    • Fracture of phalanx of toe     Resolved 2/1/2016      Present on Admission:  **None**      Admitting Diagnosis: Hyperkalemia [E87 5]  Nausea and vomiting [R11 2]  DKA (diabetic ketoacidosis) ([facility] ) [E11 10]  Hyperglycemia [R73 9]  HARRIET (acute kidney injury) ([facility] ) [N17 9]  Age/Sex: 23 y o  male  Admission Orders: 3/6/23 1900 inpatient   Scheduled Medications:  chlorhexidine, 15 mL, Mouth/Throat, Q12H [facility] & longterm  insulin glargine, 22 Units, Subcutaneous, HS start 2200 3/7/23   [START ON 3/8/2023] insulin lispro, 1-5 Units, Subcutaneous, HS  [START ON 3/8/2023] insulin lispro, 1-6 Units, Subcutaneous, TID AC    potassium chloride (K-DUR,KLOR-CON) CR tablet 40 mEq  Dose: 40 mEq  Freq: Once Route: PO  Start: 03/07/23 0900 End: 03/07/23 0944    sodium phosphate 30 mmol in dextrose 5 % 250 mL Infusion  Dose: 30 mmol  Freq:  Once Route: IV  Last Dose: Stopped (03/07/23 0430)  Start: 03/06/23 2215 End: 03/07/23 0430    Continuous IV Infusions:  insulin regular (HumuLIN R,NovoLIN R) infusion, 0 3-21 Units/hr, Intravenous, Titrated    dextrose 5 % and sodium chloride 0 45 % infusion  Rate: 250 mL/hr Dose: 250 mL/hr  Freq: Continuous Route: IV  Last Dose: Stopped (03/07/23 0631)  Start: 03/06/23 1915 End: 03/07/23 8530    insulin regular (HumuLIN R,NovoLIN R) 1 Units/mL in sodium chloride 0 9 % 100 mL infusion  Rate: 0 1-30 mL/hr Dose: 0 1-30 Units/hr  Freq: Continuous Route: IV  Last Dose: Stopped (03/07/23 0631)  Start: 03/06/23 1815 End: 03/07/23 2959    multi-electrolyte (PLASMALYTE-A/ISOLYTE-S PH 7 4) IV solution  Rate: 125 mL/hr Dose: 125 mL/hr  Freq: Continuous Route: IV  Last Dose: 125 mL/hr (03/07/23 0634)  Start: 03/07/23 0630 End: 03/07/23 0931    sodium chloride 0 9 % infusion  Rate: 500 mL/hr Dose: 500 mL/hr  Freq: Continuous Route: IV  Last Dose: Stopped (03/06/23 2352)  Start: 03/06/23 1915 End: 03/06/23 2346    PRN Meds:  acetaminophen, 650 mg, Oral, Q6H PRN  ondansetron, 4 mg, Intravenous, Q6H PRN  phenol, 1 spray, Mouth/Throat, Q2H PRN x 1 3/7/23   sodium chloride (PF), 3 mL, Intravenous, Q1H PRN    Telemetry       Network Utilization Review Department  ATTENTION: Please call with any questions or concerns to [phone] and carefully listen to the prompts so that you are directed to the right person  All voicemails are confidential   Elin Buerger all requests for admission clinical reviews, approved or denied determinations and any other requests to dedicated fax number below belonging to the campus where the patient is receiving treatment   List of dedicated fax numbers for the Facilities:  [address] DENIALS (Administrative/Medical Necessity) [phone]   [address] (Maternity/NICU/Pediatrics) [phone]   [address] [phone]   [facility] [phone]   1306 [facility] [address] [place] [address] [facility] [phone]   [address] [facility] [place] [address] [phone]

## 2023-03-07 NOTE — CASE MANAGEMENT
Case Management Assessment & Discharge Planning Note    Patient name Cleave Sharp  Location /-50 MRN [id number]  : 2000 Date 3/7/2023       Current Admission Date: 3/6/2023  Current Admission Diagnosis:Type 1 diabetes mellitus with ketoacidosis without coma [facility])   Patient Active Problem List    Diagnosis Date Noted   • HARRIET (acute kidney injury) ([facility] ) 2023   • Hyperkalemia 2023   • Hyponatremia 2023   • Lactic acidosis 2023   • Nausea & vomiting 2023   • SIRS (systemic inflammatory response syndrome) ([facility] ) 2023   • Type 1 diabetes mellitus with ketoacidosis without coma ([facility] ) 2018   • Hypokalemia 2018      LOS (days): 1  Geometric Mean LOS (GMLOS) (days):   Days to GMLOS:     OBJECTIVE:    Risk of Unplanned Readmission Score: 10 7         Current admission status: Inpatient       Preferred Pharmacy:   [address], [address] [facility] [address] [facility] [address]  [address] [facility] [facility] [address]  [facility] [address]  Phone: [phone] Fax: [phone]    Primary Care Provider: Mike Estrada DO    Primary Insurance: [employer]  Secondary Insurance:     ASSESSMENT:  [address], [address]   Primary Phone: [phone] (Mobile)               Advance Directives  Does patient have a [address] [place] [address] Avenue?: No  Was patient offered paperwork?: Yes (declined)  Does patient currently have a Health Care decision maker?: Yes, please see Health Care Proxy section  Does patient have Advance Directives?: No  Was patient offered paperwork?: Yes (declined)    Readmission Root Cause  30 Day Readmission: No    Patient Information  Admitted from[de-identified] Home  Mental Status: Alert  During Assessment patient was accompanied by: Parent  Assessment information provided by[de-identified] Patient  Primary Caregiver: Self  Support Systems: Parent  Home entry access options   Select all that apply : Stairs  Number of steps to enter home : 3  Type of Current Residence: 3 story home  Upon entering residence, is there a bedroom on the main floor (no further steps)?: Yes  Upon entering residence, is there a bathroom on the main floor (no further steps)?: Yes  In the last 12 months, was there a time when you were not able to pay the mortgage or rent on time?: No  In the last 12 months, how many places have you lived?: 1  In the last 12 months, was there a time when you did not have a steady place to sleep or slept in a shelter (including now)?: No  Homeless/housing insecurity resource given?: N/A  Living Arrangements: Lives w/ Parent(s)  Is patient a ?: No    Activities of Daily Living Prior to Admission  Functional Status: Independent  Completes ADLs independently?: Yes  Ambulates independently?: Yes  Does patient use assisted devices?: No  Does patient currently own DME?: No  Does patient have a history of Outpatient Therapy (PT/OT)?: No  Does the patient have a history of Short-Term Rehab?: No  Does patient have a history of HHC?: No  Does patient currently have [facility]?: No         Patient Information Continued  Income Source: Employed  Does patient have prescription coverage?: Yes  Within the past 12 months, you worried that your food would run out before you got the money to buy more : Never true  Within the past 12 months, the food you bought just didn't last and you didn't have money to get more : Never true  Food insecurity resource given?: N/A  Does patient receive dialysis treatments?: No  Does patient have a history of substance abuse?: No  Does patient have a history of Mental Health Diagnosis?: No    Means of Transportation  Means of Transport to Appts[de-identified] Drives Self  In the past 12 months, has lack of transportation kept you from medical appointments or from getting medications?: No  In the past 12 months, has lack of transportation kept you from meetings, work, or from getting things needed for daily living?: No  Was application for public transport provided?: N/A    DISCHARGE DETAILS:    Discharge planning discussed with[de-identified] patient and wife  Freedom of Choice: Yes     CM contacted family/caregiver?: Yes  Were Treatment Team discharge recommendations reviewed with patient/caregiver?: Yes  Did patient/caregiver verbalize understanding of patient care needs?: Yes  Were patient/caregiver advised of the risks associated with not following Treatment Team discharge recommendations?: Yes    Contacts  Patient Contacts: Chastity Webb: mother  Relationship to Patient[de-identified] Family  Contact Method: In Person  Reason/Outcome: Discharge [address] [place]         Is the patient interested in [address] at discharge?: No    DME Referral Provided  Referral made for DME?: No    Treatment Team Recommendation: Home  Discharge Destination Plan[de-identified] Home  Transport at Discharge : Family     Additional Comments: Met with pt and pt's mother to discuss the role of CM and to discuss any help pt may need prior to dc  Pt lives with his parents in a 3 story home with 3 ROMMEL  Pt performed ADL's indptly pta, no use of DME  No hx of HHC or rehab  No hx of mental health or D&A treatment  Pt's preferred [facility] is [facility] in [place]  Pt drives  Pt's parents will transport home at dc  Pt and pt's mother Rey Alejandro informed CM that pt has a high deductible through his insurance and his insulin/diabetic medication is over $300 each  CM attempted to contact pt's insurance company [phone] to find out details regarding deductible and what has been met  CM spoke to [place] angelia at [employer] who states he is unable to provide CM with information  CM informed pt and mother of same  CM provided goodrx information  CM also informed Dr Trino Teran of high cost of diabetic medications

## 2023-03-07 NOTE — ASSESSMENT & PLAN NOTE
• Resolved  • Admission K 5 9 in the setting of HARRIET and DKA  • Started DKA insulin gtt and IVF  • EKG without changes  • Trend bmp q4h  • Monitor on telemetry

## 2023-03-07 NOTE — ASSESSMENT & PLAN NOTE
• POA AEB tachycardia, tachypnea, and leukocytosis  • UA without evidence of infection  • CXR clear  • SIRS likely 2/2 DKA and not acute bacterial infection  • Monitor off abx  • Blood cultures pending  • Trend fever curve and WBC count

## 2023-03-08 ENCOUNTER — TRANSITIONAL CARE MANAGEMENT (OUTPATIENT)
Dept: FAMILY MEDICINE CLINIC | Facility: CLINIC | Age: 23
End: 2023-03-08

## 2023-03-08 VITALS
WEIGHT: 174.6 LBS | OXYGEN SATURATION: 98 % | BODY MASS INDEX: 23.65 KG/M2 | TEMPERATURE: 98.4 F | HEIGHT: 72 IN | HEART RATE: 83 BPM | RESPIRATION RATE: 27 BRPM | SYSTOLIC BLOOD PRESSURE: 125 MMHG | DIASTOLIC BLOOD PRESSURE: 60 MMHG

## 2023-03-08 LAB
ANION GAP SERPL CALCULATED.3IONS-SCNC: 6 MMOL/L (ref 4–13)
BASE EX.OXY STD BLDV CALC-SCNC: 86.2 % (ref 60–80)
BASE EXCESS BLDV CALC-SCNC: 1.1 MMOL/L
BASOPHILS # BLD AUTO: 0.01 THOUSANDS/ÂΜL (ref 0–0.1)
BASOPHILS NFR BLD AUTO: 0 % (ref 0–1)
BUN SERPL-MCNC: 18 MG/DL (ref 5–25)
CALCIUM SERPL-MCNC: 7.9 MG/DL (ref 8.4–10.2)
CHLORIDE SERPL-SCNC: 102 MMOL/L (ref 96–108)
CO2 SERPL-SCNC: 25 MMOL/L (ref 21–32)
CREAT SERPL-MCNC: 0.83 MG/DL (ref 0.6–1.3)
EOSINOPHIL # BLD AUTO: 0.04 THOUSAND/ÂΜL (ref 0–0.61)
EOSINOPHIL NFR BLD AUTO: 1 % (ref 0–6)
ERYTHROCYTE [DISTWIDTH] IN BLOOD BY AUTOMATED COUNT: 12.2 % (ref 11.6–15.1)
GFR SERPL CREATININE-BSD FRML MDRD: 123 ML/MIN/1.73SQ M
GLUCOSE SERPL-MCNC: 177 MG/DL (ref 65–140)
GLUCOSE SERPL-MCNC: 214 MG/DL (ref 65–140)
GLUCOSE SERPL-MCNC: 235 MG/DL (ref 65–140)
GLUCOSE SERPL-MCNC: 251 MG/DL (ref 65–140)
GLUCOSE SERPL-MCNC: 253 MG/DL (ref 65–140)
GLUCOSE SERPL-MCNC: 261 MG/DL (ref 65–140)
GLUCOSE SERPL-MCNC: 273 MG/DL (ref 65–140)
HCO3 BLDV-SCNC: 26 MMOL/L (ref 24–30)
HCT VFR BLD AUTO: 37 % (ref 36.5–49.3)
HGB BLD-MCNC: 12.4 G/DL (ref 12–17)
IMM GRANULOCYTES # BLD AUTO: 0.02 THOUSAND/UL (ref 0–0.2)
IMM GRANULOCYTES NFR BLD AUTO: 0 % (ref 0–2)
LYMPHOCYTES # BLD AUTO: 1.63 THOUSANDS/ÂΜL (ref 0.6–4.47)
LYMPHOCYTES NFR BLD AUTO: 31 % (ref 14–44)
MAGNESIUM SERPL-MCNC: 1.7 MG/DL (ref 1.9–2.7)
MCH RBC QN AUTO: 30.2 PG (ref 26.8–34.3)
MCHC RBC AUTO-ENTMCNC: 33.5 G/DL (ref 31.4–37.4)
MCV RBC AUTO: 90 FL (ref 82–98)
MONOCYTES # BLD AUTO: 0.64 THOUSAND/ÂΜL (ref 0.17–1.22)
MONOCYTES NFR BLD AUTO: 12 % (ref 4–12)
NEUTROPHILS # BLD AUTO: 2.99 THOUSANDS/ÂΜL (ref 1.85–7.62)
NEUTS SEG NFR BLD AUTO: 56 % (ref 43–75)
NRBC BLD AUTO-RTO: 0 /100 WBCS
O2 CT BLDV-SCNC: 16.6 ML/DL
PCO2 BLDV: 42.3 MM HG (ref 42–50)
PH BLDV: 7.41 [PH] (ref 7.3–7.4)
PHOSPHATE SERPL-MCNC: 1.6 MG/DL (ref 2.7–4.5)
PLATELET # BLD AUTO: 168 THOUSANDS/UL (ref 149–390)
PMV BLD AUTO: 10.3 FL (ref 8.9–12.7)
PO2 BLDV: 49 MM HG (ref 35–45)
POTASSIUM SERPL-SCNC: 4.6 MMOL/L (ref 3.5–5.3)
PROCALCITONIN SERPL-MCNC: 2.4 NG/ML
RBC # BLD AUTO: 4.1 MILLION/UL (ref 3.88–5.62)
SODIUM SERPL-SCNC: 133 MMOL/L (ref 135–147)
WBC # BLD AUTO: 5.33 THOUSAND/UL (ref 4.31–10.16)

## 2023-03-08 RX ORDER — INSULIN GLARGINE 100 [IU]/ML
24 INJECTION, SOLUTION SUBCUTANEOUS
Status: DISCONTINUED | OUTPATIENT
Start: 2023-03-08 | End: 2023-03-08 | Stop reason: HOSPADM

## 2023-03-08 RX ORDER — MAGNESIUM SULFATE HEPTAHYDRATE 40 MG/ML
2 INJECTION, SOLUTION INTRAVENOUS ONCE
Status: COMPLETED | OUTPATIENT
Start: 2023-03-08 | End: 2023-03-08

## 2023-03-08 RX ORDER — INSULIN LISPRO 100 [IU]/ML
1-5 INJECTION, SOLUTION INTRAVENOUS; SUBCUTANEOUS
Status: DISCONTINUED | OUTPATIENT
Start: 2023-03-08 | End: 2023-03-08 | Stop reason: HOSPADM

## 2023-03-08 RX ORDER — INSULIN GLARGINE 100 [IU]/ML
24 INJECTION, SOLUTION SUBCUTANEOUS
Qty: 10 ML | Refills: 0 | Status: SHIPPED | OUTPATIENT
Start: 2023-03-08

## 2023-03-08 RX ORDER — INSULIN LISPRO 100 [IU]/ML
1 INJECTION, SOLUTION INTRAVENOUS; SUBCUTANEOUS
Status: DISCONTINUED | OUTPATIENT
Start: 2023-03-08 | End: 2023-03-08 | Stop reason: HOSPADM

## 2023-03-08 RX ADMIN — INSULIN LISPRO 3 UNITS: 100 INJECTION, SOLUTION INTRAVENOUS; SUBCUTANEOUS at 07:41

## 2023-03-08 RX ADMIN — Medication 1 SPRAY: at 07:44

## 2023-03-08 RX ADMIN — MAGNESIUM SULFATE HEPTAHYDRATE 2 G: 2 INJECTION, SOLUTION INTRAVENOUS at 09:39

## 2023-03-08 RX ADMIN — INSULIN LISPRO 3 UNITS: 100 INJECTION, SOLUTION INTRAVENOUS; SUBCUTANEOUS at 11:54

## 2023-03-08 RX ADMIN — INSULIN LISPRO 4 UNITS: 100 INJECTION, SOLUTION INTRAVENOUS; SUBCUTANEOUS at 07:41

## 2023-03-08 RX ADMIN — CHLORHEXIDINE GLUCONATE 15 ML: 1.2 RINSE ORAL at 07:53

## 2023-03-08 RX ADMIN — INSULIN LISPRO 4 UNITS: 100 INJECTION, SOLUTION INTRAVENOUS; SUBCUTANEOUS at 11:56

## 2023-03-08 NOTE — DISCHARGE SUMMARY
[place]  Discharge- Nancy Beyer 2000, 23 y o  male MRN: [id number]  Unit/Bed#: -01 Encounter: [id number]  Primary Care Provider: Angelique Bailey DO   Date and time admitted to hospital: 3/6/2023  4:16 PM    SIRS (systemic inflammatory response syndrome) ([facility] 75 )  Assessment & Plan  • POA AEB tachycardia, tachypnea, and leukocytosis  • UA without evidence of infection  • CXR clear  • SIRS likely 2/2 DKA and not acute bacterial infection  • Monitor off abx  • Blood cultures pending  • Trend fever curve and WBC count    Nausea & vomiting  Assessment & Plan  • Zofran PRN  • Symptoms resolved, stable for discharge with plans to follow up with endocrinologist as outpatient    Lactic acidosis  Assessment & Plan  • In the setting of severe dehydration 2/2 DKA  • Continue IVF per DKA protocol  • Continue to trend until cleared       Hyponatremia  Assessment & Plan  • Pseudohyponatremia 2/2 hyperglycemia  • Corrected Na 136  • Continue to trend    Hyperkalemia  Assessment & Plan  • Resolved  • Admission K 5 9 in the setting of HARRIET and DKA  • Started DKA insulin gtt and IVF  • EKG without changes  • Trend bmp q4h  • Monitor on telemetry    HARRIET (acute kidney injury) ([facility] 75 )  Assessment & Plan  • Baseline creatinine 0 9-1 0  • Admission creatinine 1 8, improved to 1 03  • In the setting of DKA  • Continue IVF resuscitation per DKA protocol  • Trend bmp q4h  • Monitor I/O    * Type 1 diabetes mellitus with ketoacidosis without coma [facility])  Assessment & Plan  Lab Results   Component Value Date    HGBA1C 8 9 (H) 03/06/2023       Recent Labs     03/07/23  0619 03/07/23  0720 03/07/23  0758 03/07/23  0847   POCGLU 74 38* 90 149*       Blood Sugar Average: Last 72 hrs:  (P) 226 [id number]     • POA AEB pH 7 105, CO2 11, AG 25, , BHB 5 1  • Pt reports N/V and mild abdominal pain for the past 24 hours   States that his mother had a GI illness 2 days before  • Home insulin regimen: Novolog 6u at breakfast/lunch, 12 units at dinner, and Basaglar 22u q HS  ? Pt monitors BG with Dexcom  • Transitioned to non-DKA insulin 3/7 AM with plans for home Lantus in PM           ? Transition to SSI insulin 3/8  • Initiate Carb Controlled diet  • Stable for discharge with plans to follow up with endocrinologist as outpatient          Medical Problems     Resolved Problems  Date Reviewed: 9/1/2022   None       Discharging Physician / Practitioner: Jose Eduardo Sung MD  PCP: Clay Thompson DO  Admission Date:   Admission Orders (From admission, onward)     Ordered        03/06/23 1900  INPATIENT ADMISSION  Once                      Discharge Date: 03/08/23    Reason for Admission: Nausea and vomiting    Hospital Course:   Christine Espinal is a 23 y o  male patient with past medical history of type I DM, who originally presented to the hospital on 3/6/2023 due to nausea and vomiting  Patient was admitted for hyperglycemia secondary to DKA  This was likely precipitated by a possible viral illness as mother also had similar symptoms prior to presentation  Patient received IV fluids, electrolyte management as well as commencement on the insulin drip  Patient's anion gap closed and was transitioned to subcut insulin  Patient is currently stable as his symptoms have resolved  Patient will follow up with his endocrinologist as outpatient  Patient also needs to follow-up with his primary care physician within one week of discharge to discuss his recent hospitalization and to have his labs checked  The rest of his hospital course as documented above  Please see above list of diagnoses and related plan for additional information       Condition at Discharge: stable    Discharge Day Visit / Exam:   Subjective: No Fresh complaint    Vitals: Blood Pressure: 125/60 (03/08/23 1200)  Pulse: 83 (03/08/23 1200)  Temperature: 98 4 °F (36 9 °C) (03/08/23 1100)  Temp Source: Oral (03/08/23 1100)  Respirations: (!) 27 (03/08/23 1200)  Height: 6' (182 9 cm) (03/06/23 1630)  Weight - Scale: 79 2 kg (174 lb 9 7 oz) (03/08/23 0600)  SpO2: 98 % (03/08/23 1200)  Exam:   Physical Exam  Vitals and nursing note reviewed  Constitutional:       General: He is not in acute distress  Appearance: He is well-developed  HENT:      Head: Normocephalic and atraumatic  Eyes:      Conjunctiva/sclera: Conjunctivae normal    Cardiovascular:      Rate and Rhythm: Normal rate and regular rhythm  Heart sounds: No murmur heard  Pulmonary:      Effort: Pulmonary effort is normal  No respiratory distress  Breath sounds: Normal breath sounds  Abdominal:      Palpations: Abdomen is soft  Tenderness: There is no abdominal tenderness  Musculoskeletal:         General: No swelling  Cervical back: Neck supple  Skin:     General: Skin is warm  Capillary Refill: Capillary refill takes less than 2 seconds  Neurological:      Mental Status: He is alert  Psychiatric:         Mood and Affect: Mood normal             Discussion with Family: Updated  (mother) at bedside  Discharge instructions/Information to patient and family:   See after visit summary for information provided to patient and family  Provisions for Follow-Up Care:  See after visit summary for information related to follow-up care and any pertinent home health orders  Disposition:   Home      Discharge Statement:  I spent 69  minutes discharging the patient  This time was spent on the day of discharge  I had direct contact with the patient on the day of discharge  Greater than 50% of the total time was spent examining patient, answering all patient questions, arranging and discussing plan of care with patient as well as directly providing post-discharge instructions  Additional time then spent on discharge activities      Discharge Medications:  See after visit summary for reconciled discharge medications provided to patient and/or family        **Please Note: This note may have been constructed using a voice recognition system**

## 2023-03-08 NOTE — LETTER
Head, normocephalic, atraumatic, Face, Face within normal limits, Ears, External ears within normal limits November 21, 2018     Patient: Burgess Diallo   YOB: 2000   Date of Visit: 11/21/2018       To Whom it May Concern:    Michelle Blackmon is under my professional care  He was seen in my office on 11/21/2018  He may return to work  On 12/17/18  If you have any questions or concerns, please don't hesitate to call           Sincerely,          Carla Gallo DO        CC: No Recipients

## 2023-03-09 LAB
ATRIAL RATE: 93 BPM
P AXIS: 83 DEGREES
PR INTERVAL: 138 MS
QRS AXIS: 88 DEGREES
QRSD INTERVAL: 92 MS
QT INTERVAL: 342 MS
QTC INTERVAL: 425 MS
T WAVE AXIS: 66 DEGREES
VENTRICULAR RATE: 93 BPM

## 2023-03-09 NOTE — UTILIZATION REVIEW
NOTIFICATION OF ADMISSION DISCHARGE   This is a Notification of Discharge from [facility]  Please be advised that this patient has been discharge from our facility  Below you will find the admission and discharge date and time including the patient’s disposition  UTILIZATION REVIEW CONTACT:  Gabby Adams  Utilization   Network Utilization Review Department  Phone: [phone] carefully listen to the prompts  All voicemails are confidential   Email: [email or web address]     ADMISSION INFORMATION  PRESENTATION DATE: 3/6/2023  4:16 PM  OBERVATION ADMISSION DATE:   INPATIENT ADMISSION DATE: 3/6/23  7:00 PM   DISCHARGE DATE: 3/8/2023  3:59 PM   DISPOSITION:Home/Self Care    IMPORTANT INFORMATION:  Send all requests for admission clinical reviews, approved or denied determinations and any other requests to dedicated fax number below belonging to the campus where the patient is receiving treatment   List of dedicated fax numbers:  [address] DENIALS (Administrative/Medical Necessity) [phone]   [address] (Maternity/NICU/Pediatrics) [phone]   [facility] [phone]   [facility] [phone]   [address] [phone]   220 [facility] [phone]   90 [facility] [phone]   [address] [place] [address] [phone]   [facility]  [phone]   [address] [facility] [phone]   412 [facility] [address] [facility] [phone]

## 2023-03-10 ENCOUNTER — TRANSITIONAL CARE MANAGEMENT (OUTPATIENT)
Dept: FAMILY MEDICINE CLINIC | Facility: CLINIC | Age: 23
End: 2023-03-10

## 2023-03-10 LAB
ATRIAL RATE: 98 BPM
P AXIS: 84 DEGREES
PR INTERVAL: 136 MS
QRS AXIS: 86 DEGREES
QRSD INTERVAL: 92 MS
QT INTERVAL: 352 MS
QTC INTERVAL: 449 MS
T WAVE AXIS: 61 DEGREES
VENTRICULAR RATE: 98 BPM

## 2023-03-11 ENCOUNTER — TELEPHONE (OUTPATIENT)
Dept: OTHER | Facility: OTHER | Age: 23
End: 2023-03-11

## 2023-03-11 LAB
BACTERIA BLD CULT: NORMAL
BACTERIA BLD CULT: NORMAL

## 2023-03-11 NOTE — TELEPHONE ENCOUNTER
Patient is calling regarding cancelling an appointment      Date/Time: [email or web address]    Patient was rescheduled: YES [] NO [x]    Patient requesting call back to reschedule: YES [] NO [x]

## 2023-03-13 ENCOUNTER — TELEPHONE (OUTPATIENT)
Dept: ENDOCRINOLOGY | Facility: HOSPITAL | Age: 23
End: 2023-03-13

## 2023-03-13 NOTE — TELEPHONE ENCOUNTER
The patient's mother called and stated that the patient was recently seen at the hospital for DKA last week  He went into the hospital on Monday and was released on Wednesday  She stated that the patient it feeling better and it up to returning to work, but he would need a Doctor's note  Is this something that you would feel comfortable doing, the patient does have an upcoming appointment with Kourtney Holbrook on 3/21

## 2023-03-13 NOTE — TELEPHONE ENCOUNTER
Can give him a doctor's note to return to work  We just need to know what day he wants to return so we can get that no printed off

## 2023-03-14 ENCOUNTER — TELEPHONE (OUTPATIENT)
Dept: ENDOCRINOLOGY | Facility: HOSPITAL | Age: 23
End: 2023-03-14

## 2023-03-14 NOTE — TELEPHONE ENCOUNTER
I was able to get a hold of the patient to see what date he was going back to work and the patient stated that he may go back on Monday, but the decided that he will discuss with the provider at the next appointment, which is next Tuesday  The patient also stated that he will be bringing paperwork with him from home concerning leave(STD or FMLA)

## 2023-03-14 NOTE — TELEPHONE ENCOUNTER
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 She also gave a fax number to his work if we could fax it to the representative there      Virginia Amado    PKL:[phone]        thanks

## 2023-03-21 ENCOUNTER — OFFICE VISIT (OUTPATIENT)
Dept: ENDOCRINOLOGY | Facility: HOSPITAL | Age: 23
End: 2023-03-21

## 2023-03-21 VITALS
BODY MASS INDEX: 23.03 KG/M2 | SYSTOLIC BLOOD PRESSURE: 110 MMHG | WEIGHT: 170 LBS | DIASTOLIC BLOOD PRESSURE: 68 MMHG | HEIGHT: 72 IN | HEART RATE: 76 BPM

## 2023-03-21 DIAGNOSIS — E78.41 ELEVATED LIPOPROTEIN(A): ICD-10-CM

## 2023-03-21 DIAGNOSIS — E10.65 TYPE 1 DIABETES MELLITUS WITH HYPERGLYCEMIA (HCC): Primary | ICD-10-CM

## 2023-03-21 NOTE — PATIENT INSTRUCTIONS
Be mindful of diet  Please stay hydrated with water  For now, continue Lantus 24 units  STAY CONSISTENT WITH LANTUS DOSES !!!!     Continue Humalog at 6 units at breakfast and 12 units at dinner  Decrease lunch to 5 units  Continue to utilize the DEX COM and send a record to the office in 2 weeks for review  Obtain lab work as prescribed        Gómez Lora

## 2023-03-21 NOTE — PROGRESS NOTES
Kee Burris 23 y o  male MRN: [id number]    Encounter: [id number]      Assessment/Plan     Assessment: This is a 23y o -year-old male with type 1 diabetes  Plan:  1  Type 1 diabetes: His most recent hemoglobin A1c is 8 9   Download of his dex com reveals hyperglycemia overnight with some hypoglycemia following lunch  He was hospitalized with DKA earlier in the month  Hyperglycemia overnight can be attributed to forgetting his Basaglar dose before bedtime  Discussed utilizing an alarm to remind him of his Basaglar dose  For his hypoglycemia following lunch I have asked him to decrease his base dose of mealtime insulin to 5 units  He will continue NovoLog 6 units with breakfast and 12 units with dinner  He is utilizing a sliding scale of 1: 30 for blood sugars over 150  We discussed learning proper flexible insulin therapy but unfortunately his insurance does not pay for diabetes education   I have asked him to upload his Dexcom in 1-2 weeks so that it can be reviewed so changes can be made to help his glycemic control throughout the day   Reviewed the metabolic consequences of uncontrolled diabetes including neuropathy, nephropathy and retinopathy   Check hemoglobin A1c and comprehensive metabolic panel prior to next visit      2   Hyperlipidemia: Check fasting lipid panel prior to next visit  CC: Type 1 Diabetes follow up    History of Present Illness     HPI:  22 y  o  male with type 1 diabetes diagnosed approximately 3-4 years ago   He was admitted to the hospital in early November in diabetic ketoacidosis and was found to have type 1 diabetes  His most recent hemoglobin A1c from March 6, 2023 is 8 9   He is currently utilizing Novolog 6 units at breakfast and lunch with 10 units at dinner with Basaglar 22 units at bedtime  He was hospitalized for DKA on March 6, 2023  Currently, he denies chest pain or shortness of breath   He obtained a diabetic eye exam on March 20, 2019 which showed no retinopathy   His most recent diabetic foot exam was performed on November 5, 2020  He denies neuropathy, nephropathy, retinopathy, heart attack, stroke and claudication but does admit to none       Hypoglycemic episodes: Rare      Blood Sugar/Glucometer/Pump/CGM review: Dex com download from March 8 through March 21, 2023 reveals an average glucose of 170 with a standard deviation of at 55  He is in target range 35% of the time with 53% low or very low and 44 % high or very high  There is less than 3% low blood sugars on this report  Review of Systems   Constitutional: Positive for fatigue  Negative for chills and fever  HENT: Negative  Negative for trouble swallowing and voice change  Eyes: Negative for photophobia, pain, discharge, redness, itching and visual disturbance  Respiratory: Negative for cough and shortness of breath  Cardiovascular: Negative for chest pain and palpitations  Gastrointestinal: Negative for abdominal pain, constipation, diarrhea, nausea and vomiting  Endocrine: Negative for cold intolerance, heat intolerance, polydipsia, polyphagia and polyuria  Genitourinary: Negative  Musculoskeletal: Negative  Skin: Negative  Allergic/Immunologic: Negative  Neurological: Negative for dizziness, syncope, light-headedness and headaches  Hematological: Negative  Psychiatric/Behavioral: Negative  All other systems reviewed and are negative  Historical Information   Past Medical History:   Diagnosis Date   • Diabetes mellitus ([facility] [address] )    • Fracture of phalanx of toe     Resolved 2/1/2016      No past surgical history on file    Social History   Social History     Substance and Sexual Activity   Alcohol Use Yes    Comment: social     Social History     Substance and Sexual Activity   Drug Use No     Social History     Tobacco Use   Smoking Status Never   Smokeless Tobacco Never     Family History:   Family History   Problem Relation Age of Onset   • Diabetes Family • No Known Problems Mother    • No Known Problems Father    • No Known Problems Sister    • No Known Problems Brother    • No Known Problems Brother    • No Known Problems Brother    • No Known Problems Sister        Meds/Allergies   Current Outpatient Medications   Medication Sig Dispense Refill   • Accu-Chek Softclix Lancets lancets Check blood sugars 4 times a day 400 each 2   • acetone, urine, test strip 1 strip by Does not apply route as needed for high blood sugar 25 each 4   • BD Pen Needle Renita 2nd Gen 32G X 4 MM MISC USE WITH INSULIN UP TO 5 TIMES DAILY AS DIRECTED 500 each 3   • Continuous Blood Gluc  (DEXCOM G6 ) SHAYNA Use  to scan blood sugars daily  1 Device 0   • Continuous Blood Gluc Sensor (Dexcom G6 Sensor) MISC CHANGE SENSOR EVERY 10 DAYS 3 each 10   • Continuous Blood Gluc Transmit (Dexcom G6 Transmitter) MISC USE AS DIRECTED TO TEST BLOOD SUGARS 1 each 3   • glucagon (GLUCAGON EMERGENCY) 1 MG injection Inject 1 mg under the skin once as needed for low blood sugar for up to 1 dose 1 each 5   • insulin aspart (NovoLOG FlexPen) 100 UNIT/ML injection pen INJECT 6 UNITS AT BREAKFAST AND LUNCH, 12 UNITS AT DINNER 15 mL 4   • insulin glargine (LANTUS) 100 units/mL subcutaneous injection Inject 24 Units under the skin daily at bedtime 10 mL 0     No current facility-administered medications for this visit  No Known Allergies    Objective   Vitals: There were no vitals taken for this visit  Physical Exam  Vitals reviewed  Constitutional:       Appearance: He is well-developed  HENT:      Head: Normocephalic and atraumatic  Nose: Nose normal    Eyes:      Conjunctiva/sclera: Conjunctivae normal       Pupils: Pupils are equal, round, and reactive to light  Cardiovascular:      Rate and Rhythm: Normal rate and regular rhythm  Heart sounds: Normal heart sounds  Pulmonary:      Effort: Pulmonary effort is normal       Breath sounds: Normal breath sounds  Abdominal:      General: Bowel sounds are normal       Palpations: Abdomen is soft  Musculoskeletal:         General: Normal range of motion  Cervical back: Normal range of motion and neck supple  Skin:     General: Skin is warm and dry  Neurological:      Mental Status: He is alert and oriented to person, place, and time  Psychiatric:         Behavior: Behavior normal          Thought Content:  Thought content normal          Judgment: Judgment normal        Lab Results:   Lab Results   Component Value Date/Time    Hemoglobin A1C 8 9 (H) 03/06/2023 04:27 PM    Hemoglobin A1C 7 5 (H) 12/08/2022 07:07 AM    Hemoglobin A1C 7 3 (H) 09/01/2022 07:47 AM    Hemoglobin A1C 8 6 (H) 05/26/2022 07:28 AM    WBC 5 33 03/08/2023 04:51 AM    WBC 8 61 03/07/2023 04:52 AM    WBC 13 25 (H) 03/06/2023 04:27 PM    Hemoglobin 12 4 03/08/2023 04:51 AM    Hemoglobin 12 9 03/07/2023 04:52 AM    Hemoglobin 17 2 (H) 03/06/2023 04:27 PM    Hgb, i-STAT 18 7 (H) 03/06/2023 04:47 PM    Hematocrit 37 0 03/08/2023 04:51 AM    Hematocrit 37 9 03/07/2023 04:52 AM    Hematocrit 53 8 (H) 03/06/2023 04:27 PM    Hct, i-STAT 55 (H) 03/06/2023 04:47 PM    MCV 90 03/08/2023 04:51 AM    MCV 89 03/07/2023 04:52 AM    MCV 94 03/06/2023 04:27 PM    Platelets 266 43/56/8259 04:51 AM    Platelets 209 42/53/3580 04:52 AM    Platelets 310 44/17/5307 04:27 PM    BUN 18 03/08/2023 04:51 AM    BUN 23 03/07/2023 04:52 AM    BUN 26 (H) 03/07/2023 12:03 AM    BUN 22 12/08/2022 07:07 AM    BUN 21 09/01/2022 07:47 AM    BUN 25 05/26/2022 07:28 AM    Potassium 4 6 03/08/2023 04:51 AM    Potassium 3 5 03/07/2023 04:52 AM    Potassium 4 5 03/07/2023 12:03 AM    Potassium 4 2 12/08/2022 07:07 AM    Potassium 4 9 09/01/2022 07:47 AM    Potassium 4 8 05/26/2022 07:28 AM    Chloride 102 03/08/2023 04:51 AM    Chloride 108 03/07/2023 04:52 AM    Chloride 107 03/07/2023 12:03 AM    Chloride 100 12/08/2022 07:07 AM    Chloride 99 09/01/2022 07:47 AM    Chloride 99 05/26/2022 07:28 AM    CO2 25 03/08/2023 04:51 AM    CO2 24 03/07/2023 04:52 AM    CO2 21 03/07/2023 12:03 AM    CO2 28 12/08/2022 07:07 AM    CO2 30 09/01/2022 07:47 AM    CO2 26 05/26/2022 07:28 AM    CO2, i-STAT 15 (L) 03/06/2023 04:47 PM    Creatinine 0 83 03/08/2023 04:51 AM    Creatinine 0 99 03/07/2023 04:52 AM    Creatinine 1 03 03/07/2023 12:03 AM    AST 20 03/06/2023 04:27 PM    AST 24 12/08/2022 07:07 AM    AST 17 09/01/2022 07:47 AM    AST 14 05/26/2022 07:28 AM    ALT 26 03/06/2023 04:27 PM    ALT 23 12/08/2022 07:07 AM    ALT 13 09/01/2022 07:47 AM    ALT 13 05/26/2022 07:28 AM    Albumin 5 1 (H) 03/06/2023 04:27 PM    Albumin 4 7 12/08/2022 07:07 AM    Albumin 4 3 09/01/2022 07:47 AM    Albumin 4 6 05/26/2022 07:28 AM    Globulin 2 1 12/08/2022 07:07 AM    Globulin 2 0 09/01/2022 07:47 AM    Globulin 2 0 05/26/2022 07:28 AM    HDL 68 12/08/2022 07:07 AM    HDL 58 05/26/2022 07:28 AM    Triglycerides 75 12/08/2022 07:07 AM    Triglycerides 80 05/26/2022 07:28 AM       Portions of the record may have been created with voice recognition software  Occasional wrong word or "sound a like" substitutions may have occurred due to the inherent limitations of voice recognition software  Read the chart carefully and recognize, using context, where substitutions have occurred

## 2023-03-24 NOTE — TELEPHONE ENCOUNTER
Note to return to [facility] and [facility] paperwork to [phone] Attn: Héctor Casas

## 2023-07-01 ENCOUNTER — NURSE TRIAGE (OUTPATIENT)
Dept: OTHER | Facility: OTHER | Age: 23
End: 2023-07-01

## 2023-07-01 ENCOUNTER — OFFICE VISIT (OUTPATIENT)
Dept: URGENT CARE | Facility: CLINIC | Age: 23
End: 2023-07-01
Payer: COMMERCIAL

## 2023-07-01 VITALS
DIASTOLIC BLOOD PRESSURE: 62 MMHG | RESPIRATION RATE: 16 BRPM | TEMPERATURE: 97.7 F | HEART RATE: 64 BPM | OXYGEN SATURATION: 98 % | SYSTOLIC BLOOD PRESSURE: 132 MMHG

## 2023-07-01 DIAGNOSIS — H60.331 ACUTE SWIMMER'S EAR OF RIGHT SIDE: Primary | ICD-10-CM

## 2023-07-01 PROCEDURE — 99213 OFFICE O/P EST LOW 20 MIN: CPT | Performed by: FAMILY MEDICINE

## 2023-07-01 RX ORDER — NEOMYCIN SULFATE, POLYMYXIN B SULFATE, HYDROCORTISONE 3.5; 10000; 1 MG/ML; [USP'U]/ML; MG/ML
3 SOLUTION/ DROPS AURICULAR (OTIC) EVERY 8 HOURS SCHEDULED
Qty: 1.4 ML | Refills: 0 | Status: SHIPPED | OUTPATIENT
Start: 2023-07-01 | End: 2023-07-04

## 2023-07-01 NOTE — TELEPHONE ENCOUNTER
"Regarding: swimmers ear  ----- Message from Celena Hood sent at 7/1/2023 12:06 AM EDT -----  \" I've gotten water in my ear earlier swimming, and it has gotten progressively worse over the day  \"    "

## 2023-07-01 NOTE — TELEPHONE ENCOUNTER
C/o right ear pain after swimming, with muffled hearing  No additional symptoms reported  Care advice given  Informed to call back if worsening/developing symptoms  Verbalized understanding  Agreeable with disposition  No further questions

## 2023-07-01 NOTE — PROGRESS NOTES
[address] [facility]        NAME: Serafin Mosqueda is a 20 y o  male  : 2000    MRN: [id number]  DATE: 2023  TIME: 8:45 AM    Assessment and Plan   Acute swimmer's ear of right side [H60 331]  1  Acute swimmer's ear of right side  neomycin-polymyxin-hydrocortisone (CORTISPORIN) 1 % SOLN            Patient Instructions       Follow up with PCP in 3-5 days  Proceed to  ER if symptoms worsen  Chief Complaint     Chief Complaint   Patient presents with   • Right Ear Pain     Right ear pain x1 day after swimming the day prior  History of Present Illness       43-year-old male presenting with right ear pain  He reports this beginning last evening after starting a pull with his head underwater  Pain in his right ear described as a throbbing, achy sensation  He also reports decreased hearing  No issues with the left ear  Review of Systems   Review of Systems   Constitutional: Negative  HENT: Positive for ear pain  Eyes: Negative  Respiratory: Negative  Cardiovascular: Negative  Gastrointestinal: Negative  Genitourinary: Negative  Skin: Negative  Allergic/Immunologic: Negative  Neurological: Negative  Hematological: Negative  Psychiatric/Behavioral: Negative  Current Medications       Current Outpatient Medications:   •  Accu-Chek Softclix Lancets lancets, Check blood sugars 4 times a day, Disp: 400 each, Rfl: 2  •  Continuous Blood Gluc  (DEXCOM G6 ) SHAYNA, Use  to scan blood sugars daily  , Disp: 1 Device, Rfl: 0  •  Continuous Blood Gluc Sensor (Dexcom G6 Sensor) MISC, CHANGE SENSOR EVERY 10 DAYS, Disp: 3 each, Rfl: 10  •  glucagon (GLUCAGON EMERGENCY) 1 MG injection, Inject 1 mg under the skin once as needed for low blood sugar for up to 1 dose, Disp: 1 each, Rfl: 5  •  insulin aspart (NovoLOG FlexPen) 100 UNIT/ML injection pen, INJECT 6 UNITS AT BREAKFAST AND LUNCH, 12 UNITS AT DINNER, Disp: 15 mL, Rfl: 4  • neomycin-polymyxin-hydrocortisone (CORTISPORIN) 1 % SOLN, Administer 3 drops to the right ear every 8 (eight) hours for 3 days, Disp: 1 4 mL, Rfl: 0  •  acetone, urine, test strip, 1 strip by Does not apply route as needed for high blood sugar, Disp: 25 each, Rfl: 4  •  BD Pen Needle Renita 2nd Gen 32G X 4 MM MISC, USE WITH INSULIN UP TO 5 TIMES DAILY AS DIRECTED, Disp: 500 each, Rfl: 3  •  Continuous Blood Gluc Transmit (Dexcom G6 Transmitter) MISC, USE AS DIRECTED TO TEST BLOOD SUGARS, Disp: 1 each, Rfl: 3  •  insulin glargine (LANTUS) 100 units/mL subcutaneous injection, Inject 24 Units under the skin daily at bedtime, Disp: 10 mL, Rfl: 0    Current Allergies     Allergies as of 07/01/2023   • (No Known Allergies)            The following portions of the patient's history were reviewed and updated as appropriate: allergies, current medications, past family history, past medical history, past social history, past surgical history and problem list      Past Medical History:   Diagnosis Date   • Diabetes mellitus ([facility] [address] )    • Fracture of phalanx of toe     Resolved 2/1/2016        No past surgical history on file  Family History   Problem Relation Age of Onset   • Diabetes Family    • No Known Problems Mother    • No Known Problems Father    • No Known Problems Sister    • No Known Problems Brother    • No Known Problems Brother    • No Known Problems Brother    • No Known Problems Sister          Medications have been verified  Objective   /62   Pulse 64   Temp 97 7 °F (36 5 °C) (Temporal)   Resp 16   SpO2 98%   No LMP for male patient  Physical Exam     Physical Exam  Constitutional:       Appearance: He is well-developed  HENT:      Head: Normocephalic  Right Ear: Swelling present  Tympanic membrane is erythematous  Left Ear: Hearing, tympanic membrane and ear canal normal    Eyes:      Pupils: Pupils are equal, round, and reactive to light     Cardiovascular:      Rate and Rhythm: Normal rate and regular rhythm  Pulmonary:      Effort: Pulmonary effort is normal    Musculoskeletal:         General: Normal range of motion  Cervical back: Normal range of motion  Skin:     General: Skin is warm  Neurological:      Mental Status: He is alert and oriented to person, place, and time

## 2023-07-01 NOTE — TELEPHONE ENCOUNTER
"  Reason for Disposition  • Mild swimmer's ear  • Decreased hearing (or another adult says that the ear canal is completely blocked with discharge)    Answer Assessment - Initial Assessment Questions  1  LOCATION: \"Which ear is involved? \"       Right ear   2  SYMPTOMS: \"What are the main symptoms? \" (e g , pain, redness, itching, discharge)    Pain of right ear  3  MOVEMENT: \"Does the pain increase when the ear is moved up and down? \" Does pushing on the tab of tissue in the front of the ear increase the pain? \"       Pain with movement   4  PAIN: \"How bad is the pain? \"  (Scale 1-10; mild, moderate or severe)    - MILD (1-3): doesn't interfere with normal activities     - MODERATE (4-7): interferes with normal activities or awakens from sleep     - SEVERE (8-10): excruciating pain, unable to do any normal activities     4/10 with max intensity 8/10 intermittent   5  ONSET: \"When did the ear symptoms start? \"     6/30  6  DISCHARGE: \"Is there any discharge? What color is it? \"     denies  7  SWIMMING: Alfvarinderia Cashing you been swimming recently? \" If Yes, ask: \"How often do you swim? Is it in a pool, lake or ocean? \"       confirms today   8  COTTON EAR SWABS: \"Do you use cotton ear swabs (Q-tips)? How often? \" (e g , never, daily, weekly)     Tried qtip    Protocols used: EAR - SWIMMER'S-ADULT-AH    "

## 2023-07-08 LAB
ALBUMIN SERPL-MCNC: 4.6 G/DL (ref 3.6–5.1)
ALBUMIN/GLOB SERPL: 2.4 (CALC) (ref 1–2.5)
ALP SERPL-CCNC: 64 U/L (ref 36–130)
ALT SERPL-CCNC: 11 U/L (ref 9–46)
AST SERPL-CCNC: 13 U/L (ref 10–40)
BASOPHILS # BLD AUTO: 29 CELLS/UL (ref 0–200)
BASOPHILS NFR BLD AUTO: 0.8 %
BILIRUB SERPL-MCNC: 0.5 MG/DL (ref 0.2–1.2)
BUN SERPL-MCNC: 23 MG/DL (ref 7–25)
BUN/CREAT SERPL: ABNORMAL (CALC) (ref 6–22)
CALCIUM SERPL-MCNC: 9.3 MG/DL (ref 8.6–10.3)
CHLORIDE SERPL-SCNC: 103 MMOL/L (ref 98–110)
CHOLEST SERPL-MCNC: 176 MG/DL
CHOLEST/HDLC SERPL: 2.9 (CALC)
CO2 SERPL-SCNC: 30 MMOL/L (ref 20–32)
CREAT SERPL-MCNC: 1.15 MG/DL (ref 0.6–1.24)
EOSINOPHIL # BLD AUTO: 29 CELLS/UL (ref 15–500)
EOSINOPHIL NFR BLD AUTO: 0.8 %
ERYTHROCYTE [DISTWIDTH] IN BLOOD BY AUTOMATED COUNT: 11.7 % (ref 11–15)
EST. AVERAGE GLUCOSE BLD GHB EST-MCNC: 160 MG/DL
EST. AVERAGE GLUCOSE BLD GHB EST-SCNC: 8.9 MMOL/L
GFR/BSA.PRED SERPLBLD CYS-BASED-ARV: 91 ML/MIN/1.73M2
GLOBULIN SER CALC-MCNC: 1.9 G/DL (CALC) (ref 1.9–3.7)
GLUCOSE SERPL-MCNC: 158 MG/DL (ref 65–99)
HBA1C MFR BLD: 7.2 % OF TOTAL HGB
HCT VFR BLD AUTO: 44.9 % (ref 38.5–50)
HDLC SERPL-MCNC: 60 MG/DL
HGB BLD-MCNC: 15.1 G/DL (ref 13.2–17.1)
LDLC SERPL CALC-MCNC: 103 MG/DL (CALC)
LYMPHOCYTES # BLD AUTO: 1447 CELLS/UL (ref 850–3900)
LYMPHOCYTES NFR BLD AUTO: 40.2 %
MCH RBC QN AUTO: 30.5 PG (ref 27–33)
MCHC RBC AUTO-ENTMCNC: 33.6 G/DL (ref 32–36)
MCV RBC AUTO: 90.7 FL (ref 80–100)
MONOCYTES # BLD AUTO: 338 CELLS/UL (ref 200–950)
MONOCYTES NFR BLD AUTO: 9.4 %
NEUTROPHILS # BLD AUTO: 1757 CELLS/UL (ref 1500–7800)
NEUTROPHILS NFR BLD AUTO: 48.8 %
NONHDLC SERPL-MCNC: 116 MG/DL (CALC)
PLATELET # BLD AUTO: 206 THOUSAND/UL (ref 140–400)
PMV BLD REES-ECKER: 10.4 FL (ref 7.5–12.5)
POTASSIUM SERPL-SCNC: 4.2 MMOL/L (ref 3.5–5.3)
PROT SERPL-MCNC: 6.5 G/DL (ref 6.1–8.1)
RBC # BLD AUTO: 4.95 MILLION/UL (ref 4.2–5.8)
SODIUM SERPL-SCNC: 139 MMOL/L (ref 135–146)
TRIGL SERPL-MCNC: 44 MG/DL
TSH SERPL-ACNC: 1.49 MIU/L (ref 0.4–4.5)
WBC # BLD AUTO: 3.6 THOUSAND/UL (ref 3.8–10.8)

## 2023-07-11 ENCOUNTER — OFFICE VISIT (OUTPATIENT)
Dept: ENDOCRINOLOGY | Facility: HOSPITAL | Age: 23
End: 2023-07-11
Payer: COMMERCIAL

## 2023-07-11 VITALS
HEIGHT: 72 IN | WEIGHT: 183.4 LBS | DIASTOLIC BLOOD PRESSURE: 78 MMHG | HEART RATE: 79 BPM | OXYGEN SATURATION: 97 % | SYSTOLIC BLOOD PRESSURE: 116 MMHG | BODY MASS INDEX: 24.84 KG/M2

## 2023-07-11 DIAGNOSIS — E10.65 TYPE 1 DIABETES MELLITUS WITH HYPERGLYCEMIA (HCC): Primary | ICD-10-CM

## 2023-07-11 DIAGNOSIS — E78.41 ELEVATED LIPOPROTEIN(A): ICD-10-CM

## 2023-07-11 PROCEDURE — 99214 OFFICE O/P EST MOD 30 MIN: CPT | Performed by: NURSE PRACTITIONER

## 2023-07-11 NOTE — PROGRESS NOTES
Patsey Koyanagi 20 y.o. male MRN: [id number]    Encounter: [id number]      Assessment/Plan     Assessment: This is a 20y.o.-year-old male with type 1 diabetes. Plan:  1. Type 1 diabetes: His most recent hemoglobin A1c is improved to 7.2.  Download of his dex com reveals hyperglycemia  after dinner and overnight. For this, I have asked him to be consistent with 12 units of Humalog at dinner. We discussed that he may increase this to 14 units with a larger meal.  We will continue 6 units with breakfast and 5 units with lunch as the decrease in the dose has significantly decreased his hypoglycemia in the afternoon as noted at his previous office visit. He is utilizing a sliding scale of 1:30 for blood sugars over 150. We discussed learning proper flexible insulin therapy but unfortunately his insurance does not pay for diabetes education.  I have asked him to upload his Dexcom in 1-2 weeks so that it can be reviewed so changes can be made to help his glycemic control throughout the day.  Reviewed the metabolic consequences of uncontrolled diabetes including neuropathy, nephropathy and retinopathy.  Check hemoglobin A1c and comprehensive metabolic panel prior to next visit.     2.  Hyperlipidemia: Improved. We will continue surveillance with lab work. CC: Type 1 Diabetes follow up    History of Present Illness     HPI:  23 y. o. male with type 1 diabetes diagnosed approximately 3-4 years ago.  He was admitted to the hospital in early November in diabetic ketoacidosis and was found to have type 1 diabetes.   His most recent hemoglobin A1c from July 7, 2023 is 7.2.  He is currently utilizing Novolog 6 units at breakfast and lunch with 10 units at dinner with Basaglar 22 units at bedtime. He was hospitalized for DKA on March 6, 2023. Currently, he denies chest pain or shortness of breath.  He obtained a diabetic eye exam on March 20, 2019 which showed no retinopathy.  His most recent diabetic foot exam was performed on November 5, 2020. He denies neuropathy, nephropathy, retinopathy, heart attack, stroke and claudication but does admit to none.      Hypoglycemic episodes: Rare.     Blood Sugar/Glucometer/Pump/CGM review: Dex com download from 27 through July 10, 2023 reveals an average glucose of 201 with a standard deviation of at 83. He is in target range 41% of the time with 53% low or very low and 46% high or very high. There is less than 3% low blood sugars on this report. Review of Systems   Constitutional: Positive for fatigue. Negative for chills and fever. HENT: Negative. Negative for trouble swallowing and voice change. Eyes: Negative for photophobia, pain, discharge, redness, itching and visual disturbance. Respiratory: Negative for cough and shortness of breath. Cardiovascular: Negative for chest pain and palpitations. Gastrointestinal: Negative for abdominal pain, constipation, diarrhea, nausea and vomiting. Endocrine: Negative for cold intolerance, heat intolerance, polydipsia, polyphagia and polyuria. Genitourinary: Negative. Musculoskeletal: Negative. Skin: Negative. Allergic/Immunologic: Negative. Neurological: Negative for dizziness, syncope, light-headedness and headaches. Hematological: Negative. Psychiatric/Behavioral: Negative. All other systems reviewed and are negative. Historical Information   Past Medical History:   Diagnosis Date   • Diabetes mellitus ([address])    • Fracture of phalanx of toe     Resolved 2/1/2016      No past surgical history on file.   Social History   Social History     Substance and Sexual Activity   Alcohol Use Yes    Comment: social     Social History     Substance and Sexual Activity   Drug Use No     Social History     Tobacco Use   Smoking Status Never   Smokeless Tobacco Never     Family History:   Family History   Problem Relation Age of Onset   • Diabetes Family    • No Known Problems Mother    • No Known Problems Father • No Known Problems Sister    • No Known Problems Brother    • No Known Problems Brother    • No Known Problems Brother    • No Known Problems Sister        Meds/Allergies   Current Outpatient Medications   Medication Sig Dispense Refill   • Accu-Chek Softclix Lancets lancets Check blood sugars 4 times a day 400 each 2   • acetone, urine, test strip 1 strip by Does not apply route as needed for high blood sugar 25 each 4   • BD Pen Needle Renita 2nd Gen 32G X 4 MM MISC USE WITH INSULIN UP TO 5 TIMES DAILY AS DIRECTED 500 each 3   • Continuous Blood Gluc  (DEXCOM G6 ) SHAYNA Use  to scan blood sugars daily. 1 Device 0   • Continuous Blood Gluc Sensor (Dexcom G6 Sensor) MISC CHANGE SENSOR EVERY 10 DAYS 3 each 10   • Continuous Blood Gluc Transmit (Dexcom G6 Transmitter) MISC USE AS DIRECTED TO TEST BLOOD SUGARS 1 each 3   • glucagon (GLUCAGON EMERGENCY) 1 MG injection Inject 1 mg under the skin once as needed for low blood sugar for up to 1 dose 1 each 5   • insulin aspart (NovoLOG FlexPen) 100 UNIT/ML injection pen INJECT 6 UNITS AT BREAKFAST AND LUNCH, 12 UNITS AT DINNER 15 mL 4   • insulin glargine (LANTUS) 100 units/mL subcutaneous injection Inject 24 Units under the skin daily at bedtime 10 mL 0   • neomycin-polymyxin-hydrocortisone (CORTISPORIN) 1 % SOLN Administer 3 drops to the right ear every 8 (eight) hours for 3 days 1.4 mL 0     No current facility-administered medications for this visit. No Known Allergies    Objective   Vitals: There were no vitals taken for this visit. Physical Exam  Vitals reviewed. Constitutional:       Appearance: He is well-developed. HENT:      Head: Normocephalic and atraumatic. Nose: Nose normal.   Eyes:      Conjunctiva/sclera: Conjunctivae normal.      Pupils: Pupils are equal, round, and reactive to light. Cardiovascular:      Rate and Rhythm: Normal rate and regular rhythm. Heart sounds: Normal heart sounds.    Pulmonary: Effort: Pulmonary effort is normal.      Breath sounds: Normal breath sounds. Abdominal:      General: Bowel sounds are normal.      Palpations: Abdomen is soft. Musculoskeletal:         General: Normal range of motion. Cervical back: Normal range of motion and neck supple. Skin:     General: Skin is warm and dry. Neurological:      Mental Status: He is alert and oriented to person, place, and time. Psychiatric:         Behavior: Behavior normal.         Thought Content:  Thought content normal.         Judgment: Judgment normal.       Lab Results:   Lab Results   Component Value Date/Time    Hemoglobin A1C 7.2 (H) 07/07/2023 07:31 AM    Hemoglobin A1C 8.9 (H) 03/06/2023 04:27 PM    Hemoglobin A1C 7.5 (H) 12/08/2022 07:07 AM    Hemoglobin A1C 7.3 (H) 09/01/2022 07:47 AM    WBC 5.33 03/08/2023 04:51 AM    WBC 8.61 03/07/2023 04:52 AM    WBC 13.25 (H) 03/06/2023 04:27 PM    White Blood Cell Count 3.6 (L) 07/07/2023 07:31 AM    Hemoglobin 15.1 07/07/2023 07:31 AM    Hemoglobin 12.4 03/08/2023 04:51 AM    Hemoglobin 12.9 03/07/2023 04:52 AM    Hemoglobin 17.2 (H) 03/06/2023 04:27 PM    Hgb, i-STAT 18.7 (H) 03/06/2023 04:47 PM    HCT 44.9 07/07/2023 07:31 AM    Hematocrit 37.0 03/08/2023 04:51 AM    Hematocrit 37.9 03/07/2023 04:52 AM    Hematocrit 53.8 (H) 03/06/2023 04:27 PM    Hct, i-STAT 55 (H) 03/06/2023 04:47 PM    MCV 90.7 07/07/2023 07:31 AM    MCV 90 03/08/2023 04:51 AM    MCV 89 03/07/2023 04:52 AM    MCV 94 03/06/2023 04:27 PM    Platelet Count 027 08/06/6716 07:31 AM    Platelets 275 61/75/7645 04:51 AM    Platelets 558 06/85/6912 04:52 AM    Platelets 991 73/10/6860 04:27 PM    BUN 23 07/07/2023 07:31 AM    BUN 18 03/08/2023 04:51 AM    BUN 23 03/07/2023 04:52 AM    BUN 26 (H) 03/07/2023 12:03 AM    BUN 22 12/08/2022 07:07 AM    BUN 21 09/01/2022 07:47 AM    Potassium 4.2 07/07/2023 07:31 AM    Potassium 4.6 03/08/2023 04:51 AM    Potassium 3.5 03/07/2023 04:52 AM    Potassium 4.5 03/07/2023 12:03 AM    Potassium 4.2 12/08/2022 07:07 AM    Potassium 4.9 09/01/2022 07:47 AM    Chloride 103 07/07/2023 07:31 AM    Chloride 102 03/08/2023 04:51 AM    Chloride 108 03/07/2023 04:52 AM    Chloride 107 03/07/2023 12:03 AM    Chloride 100 12/08/2022 07:07 AM    Chloride 99 09/01/2022 07:47 AM    CO2 30 07/07/2023 07:31 AM    CO2 25 03/08/2023 04:51 AM    CO2 24 03/07/2023 04:52 AM    CO2 21 03/07/2023 12:03 AM    CO2 28 12/08/2022 07:07 AM    CO2 30 09/01/2022 07:47 AM    CO2, i-STAT 15 (L) 03/06/2023 04:47 PM    Creatinine 1.15 07/07/2023 07:31 AM    Creatinine 0.83 03/08/2023 04:51 AM    Creatinine 0.99 03/07/2023 04:52 AM    Creatinine 1.03 03/07/2023 12:03 AM    AST 13 07/07/2023 07:31 AM    AST 20 03/06/2023 04:27 PM    AST 24 12/08/2022 07:07 AM    AST 17 09/01/2022 07:47 AM    ALT 11 07/07/2023 07:31 AM    ALT 26 03/06/2023 04:27 PM    ALT 23 12/08/2022 07:07 AM    ALT 13 09/01/2022 07:47 AM    Total Protein 8.0 03/06/2023 04:27 PM    Protein, Total 6.5 07/07/2023 07:31 AM    Protein, Total 6.8 12/08/2022 07:07 AM    Protein, Total 6.3 09/01/2022 07:47 AM    Albumin 4.6 07/07/2023 07:31 AM    Albumin 5.1 (H) 03/06/2023 04:27 PM    Albumin 4.7 12/08/2022 07:07 AM    Albumin 4.3 09/01/2022 07:47 AM    Globulin 1.9 07/07/2023 07:31 AM    Globulin 2.1 12/08/2022 07:07 AM    Globulin 2.0 09/01/2022 07:47 AM    HDL 60 07/07/2023 07:31 AM    HDL 68 12/08/2022 07:07 AM    Triglycerides 44 07/07/2023 07:31 AM    Triglycerides 75 12/08/2022 07:07 AM     Portions of the record may have been created with voice recognition software. Occasional wrong word or "sound a like" substitutions may have occurred due to the inherent limitations of voice recognition software. Read the chart carefully and recognize, using context, where substitutions have occurred.

## 2023-07-11 NOTE — PATIENT INSTRUCTIONS
Be mindful of diet. Please stay hydrated with water. For now, continue Lantus 24 units. STAY CONSISTENT WITH LANTUS DOSES !!!!     Continue Humalog at 6 units at breakfast.    Be consistent with 12 units at dinner and may increase to 14 units with bigger dinners. Continue lunch time dose at 5 units. Continue to utilize the DEX COM and send a record to the office in 2 weeks for review. Obtain lab work as prescribed.

## 2023-07-13 ENCOUNTER — DOCUMENTATION (OUTPATIENT)
Dept: ENDOCRINOLOGY | Facility: HOSPITAL | Age: 23
End: 2023-07-13

## 2023-07-13 NOTE — PROGRESS NOTES
A prior auth was sent through [employer] Med[employer] for the patient's Dexcom G6    We are now awaiting the response of the insurance company and when they get back to us after there determination, I will contact the patient and the pharmacy.

## 2023-08-30 PROBLEM — H60.331 ACUTE SWIMMER'S EAR OF RIGHT SIDE: Status: RESOLVED | Noted: 2023-07-01 | Resolved: 2023-08-30

## 2023-12-01 DIAGNOSIS — E10.9 TYPE 1 DIABETES MELLITUS WITHOUT COMPLICATION (HCC): ICD-10-CM

## 2023-12-01 RX ORDER — PROCHLORPERAZINE 25 MG/1
SUPPOSITORY RECTAL
Qty: 3 EACH | Refills: 10 | Status: SHIPPED | OUTPATIENT
Start: 2023-12-01

## 2024-01-22 LAB
ALBUMIN SERPL-MCNC: 4.5 G/DL (ref 3.6–5.1)
ALBUMIN/CREAT UR: 3 MCG/MG CREAT
ALBUMIN/GLOB SERPL: 2.3 (CALC) (ref 1–2.5)
ALP SERPL-CCNC: 60 U/L (ref 36–130)
ALT SERPL-CCNC: 21 U/L (ref 9–46)
AST SERPL-CCNC: 16 U/L (ref 10–40)
BASOPHILS # BLD AUTO: 29 CELLS/UL (ref 0–200)
BASOPHILS NFR BLD AUTO: 0.7 %
BILIRUB SERPL-MCNC: 0.4 MG/DL (ref 0.2–1.2)
BUN SERPL-MCNC: 25 MG/DL (ref 7–25)
BUN/CREAT SERPL: NORMAL (CALC) (ref 6–22)
CALCIUM SERPL-MCNC: 9.4 MG/DL (ref 8.6–10.3)
CHLORIDE SERPL-SCNC: 105 MMOL/L (ref 98–110)
CO2 SERPL-SCNC: 28 MMOL/L (ref 20–32)
COMMENT: ABNORMAL
CREAT SERPL-MCNC: 0.94 MG/DL (ref 0.6–1.24)
CREAT UR-MCNC: 262 MG/DL (ref 20–320)
EOSINOPHIL # BLD AUTO: 42 CELLS/UL (ref 15–500)
EOSINOPHIL NFR BLD AUTO: 1 %
ERYTHROCYTE [DISTWIDTH] IN BLOOD BY AUTOMATED COUNT: 11.8 % (ref 11–15)
EST. AVERAGE GLUCOSE BLD GHB EST-MCNC: 151 MG/DL
EST. AVERAGE GLUCOSE BLD GHB EST-SCNC: 8.4 MMOL/L
GFR/BSA.PRED SERPLBLD CYS-BASED-ARV: 115 ML/MIN/1.73M2
GLOBULIN SER CALC-MCNC: 2 G/DL (CALC) (ref 1.9–3.7)
GLUCOSE SERPL-MCNC: 94 MG/DL (ref 65–99)
HBA1C MFR BLD: 6.9 % OF TOTAL HGB
HCT VFR BLD AUTO: 43.8 % (ref 38.5–50)
HGB BLD-MCNC: 14.7 G/DL (ref 13.2–17.1)
LYMPHOCYTES # BLD AUTO: 1562 CELLS/UL (ref 850–3900)
LYMPHOCYTES NFR BLD AUTO: 37.2 %
MCH RBC QN AUTO: 29.8 PG (ref 27–33)
MCHC RBC AUTO-ENTMCNC: 33.6 G/DL (ref 32–36)
MCV RBC AUTO: 88.7 FL (ref 80–100)
MICROALBUMIN UR-MCNC: 0.9 MG/DL
MONOCYTES # BLD AUTO: 412 CELLS/UL (ref 200–950)
MONOCYTES NFR BLD AUTO: 9.8 %
NEUTROPHILS # BLD AUTO: 2155 CELLS/UL (ref 1500–7800)
NEUTROPHILS NFR BLD AUTO: 51.3 %
PLATELET # BLD AUTO: 232 THOUSAND/UL (ref 140–400)
PMV BLD REES-ECKER: 10.8 FL (ref 7.5–12.5)
POTASSIUM SERPL-SCNC: 3.9 MMOL/L (ref 3.5–5.3)
PROT SERPL-MCNC: 6.5 G/DL (ref 6.1–8.1)
RBC # BLD AUTO: 4.94 MILLION/UL (ref 4.2–5.8)
SODIUM SERPL-SCNC: 141 MMOL/L (ref 135–146)
TSH SERPL-ACNC: 1.28 MIU/L (ref 0.4–4.5)
WBC # BLD AUTO: 4.2 THOUSAND/UL (ref 3.8–10.8)

## 2024-01-26 ENCOUNTER — OFFICE VISIT (OUTPATIENT)
Dept: ENDOCRINOLOGY | Facility: HOSPITAL | Age: 24
End: 2024-01-26
Payer: COMMERCIAL

## 2024-01-26 VITALS
SYSTOLIC BLOOD PRESSURE: 122 MMHG | WEIGHT: 174.6 LBS | OXYGEN SATURATION: 99 % | BODY MASS INDEX: 23.65 KG/M2 | HEART RATE: 103 BPM | DIASTOLIC BLOOD PRESSURE: 72 MMHG | HEIGHT: 72 IN

## 2024-01-26 DIAGNOSIS — E10.65 TYPE 1 DIABETES MELLITUS WITH HYPERGLYCEMIA (HCC): Primary | ICD-10-CM

## 2024-01-26 DIAGNOSIS — E10.10 TYPE 1 DIABETES MELLITUS WITH KETOACIDOSIS WITHOUT COMA (HCC): ICD-10-CM

## 2024-01-26 PROCEDURE — 95251 CONT GLUC MNTR ANALYSIS I&R: CPT | Performed by: NURSE PRACTITIONER

## 2024-01-26 PROCEDURE — 99214 OFFICE O/P EST MOD 30 MIN: CPT | Performed by: NURSE PRACTITIONER

## 2024-01-26 RX ORDER — INSULIN ASPART INJECTION 100 [IU]/ML
INJECTION, SOLUTION SUBCUTANEOUS
Qty: 15 ML | Refills: 6 | Status: SHIPPED | OUTPATIENT
Start: 2024-01-26

## 2024-01-26 NOTE — PROGRESS NOTES
Johnny Roper 23 y.o. male MRN: [id number]    Encounter: [id number]      Assessment/Plan     Assessment:  This is a 23 y.o.-year-old male with type 1 diabetes.     Plan:  1. Type 1 diabetes: His most recent hemoglobin A1c is improved to 6.9.  Download of his dex com reveals some hyperglycemia after dinner and overnight.  For his lifestyle and work schedule, immediate acting insulin such as FiASP would be beneficial for him.  We will switch from Humalog to FiASP 6 units at breakfast and lunch with 12 units at dinner.  He will continue Lantus at current dose.  I have asked him to upload his Dexcom in 1-2 weeks so that it can be reviewed so changes can be made to help his glycemic control throughout the day.  Reviewed the metabolic consequences of uncontrolled diabetes including neuropathy, nephropathy and retinopathy.  Check hemoglobin A1c and comprehensive metabolic panel prior to next visit.     2.  Hyperlipidemia: Improved.  We will continue surveillance with lab work.    CC: Type 1 Diabetes follow up    History of Present Illness     HPI:  23 y.o. male with type 1 diabetes diagnosed approximately 3-4 years ago.  He was admitted to the hospital in early November in diabetic ketoacidosis and was found to have type 1 diabetes.   His most recent hemoglobin A1c from January 22, 2024 is 6.9.  He is currently utilizing Novolog 6 units at breakfast and lunch with 10 units at dinner with Basaglar 22 units at bedtime.  He was hospitalized for DKA on March 6, 2023. Currently, he denies chest pain or shortness of breath. He obtained a diabetic eye exam on March 20, 2019 which showed no retinopathy.  His most recent diabetic foot exam was performed on November 5, 2020. He denies neuropathy, nephropathy, retinopathy, heart attack, stroke and claudication but does admit to none.      Hypoglycemic episodes: Rare.     Blood Sugar/Glucometer/Pump/CGM review: Dex com download from January 12 through January 25, 2024 reveals an  average glucose of 167 with a standard deviation of at 73. He is in target range 54% of the time with <4% low or very low and 42% high or very high readings.        Review of Systems   Constitutional:  Positive for fatigue. Negative for chills and fever.   HENT: Negative.  Negative for trouble swallowing and voice change.    Eyes:  Negative for photophobia, pain, discharge, redness, itching and visual disturbance.   Respiratory:  Negative for cough and shortness of breath.    Cardiovascular:  Negative for chest pain and palpitations.   Gastrointestinal:  Negative for abdominal pain, constipation, diarrhea, nausea and vomiting.   Endocrine: Negative for cold intolerance, heat intolerance, polydipsia, polyphagia and polyuria.   Genitourinary: Negative.    Musculoskeletal: Negative.    Skin: Negative.    Allergic/Immunologic: Negative.    Neurological:  Negative for dizziness, syncope, light-headedness and headaches.   Hematological: Negative.    Psychiatric/Behavioral: Negative.     All other systems reviewed and are negative.      Historical Information   Past Medical History:   Diagnosis Date    Diabetes mellitus ([facility])     Fracture of phalanx of toe     Resolved 2/1/2016      History reviewed. No pertinent surgical history.  Social History   Social History     Substance and Sexual Activity   Alcohol Use Yes    Comment: social     Social History     Substance and Sexual Activity   Drug Use No     Social History     Tobacco Use   Smoking Status Never   Smokeless Tobacco Never     Family History:   Family History   Problem Relation Age of Onset    Diabetes Family     No Known Problems Mother     No Known Problems Father     No Known Problems Sister     No Known Problems Brother     No Known Problems Brother     No Known Problems Brother     No Known Problems Sister        Meds/Allergies   Current Outpatient Medications   Medication Sig Dispense Refill    Accu-Chek Softclix Lancets lancets Check blood sugars 4 times a  day 400 each 2    acetone, urine, test strip 1 strip by Does not apply route as needed for high blood sugar 25 each 4    BD Pen Needle Renita 2nd Gen 32G X 4 MM MISC USE WITH INSULIN UP TO 5 TIMES DAILY AS DIRECTED 500 each 3    Continuous Blood Gluc  (DEXCOM G6 ) SHAYNA Use  to scan blood sugars daily. 1 Device 0    Continuous Blood Gluc Sensor (Dexcom G6 Sensor) MISC Replace sensors every 10 days 3 each 10    Continuous Blood Gluc Transmit (Dexcom G6 Transmitter) MISC USE AS DIRECTED TO TEST BLOOD SUGARS 1 each 3    glucagon (GLUCAGON EMERGENCY) 1 MG injection Inject 1 mg under the skin once as needed for low blood sugar for up to 1 dose 1 each 5    insulin aspart (NovoLOG FlexPen) 100 UNIT/ML injection pen INJECT 6 UNITS AT BREAKFAST AND LUNCH, 12 UNITS AT DINNER 15 mL 4    insulin glargine (LANTUS) 100 units/mL subcutaneous injection Inject 24 Units under the skin daily at bedtime 10 mL 0    neomycin-polymyxin-hydrocortisone (CORTISPORIN) 1 % SOLN Administer 3 drops to the right ear every 8 (eight) hours for 3 days 1.4 mL 0     No current facility-administered medications for this visit.     No Known Allergies    Objective   Vitals: Height 6' (1.829 m), weight 79.2 kg (174 lb 9.6 oz).    Physical Exam  Vitals reviewed.   Constitutional:       Appearance: He is well-developed.   HENT:      Head: Normocephalic and atraumatic.      Nose: Nose normal.   Eyes:      Conjunctiva/sclera: Conjunctivae normal.      Pupils: Pupils are equal, round, and reactive to light.   Cardiovascular:      Rate and Rhythm: Normal rate and regular rhythm.      Heart sounds: Normal heart sounds.   Pulmonary:      Effort: Pulmonary effort is normal.      Breath sounds: Normal breath sounds.   Abdominal:      General: Bowel sounds are normal.      Palpations: Abdomen is soft.   Musculoskeletal:         General: Normal range of motion.      Cervical back: Normal range of motion and neck supple.   Skin:     General: Skin  is warm and dry.   Neurological:      Mental Status: He is alert and oriented to person, place, and time.   Psychiatric:         Behavior: Behavior normal.         Thought Content: Thought content normal.         Judgment: Judgment normal.       Lab Results:   Lab Results   Component Value Date/Time    Hemoglobin A1C 6.9 (H) 01/22/2024 08:55 AM    Hemoglobin A1C 7.2 (H) 07/07/2023 07:31 AM    Hemoglobin A1C 8.9 (H) 03/06/2023 04:27 PM    WBC 5.33 03/08/2023 04:51 AM    WBC 8.61 03/07/2023 04:52 AM    WBC 13.25 (H) 03/06/2023 04:27 PM    White Blood Cell Count 4.2 01/22/2024 08:55 AM    White Blood Cell Count 3.6 (L) 07/07/2023 07:31 AM    Hemoglobin 14.7 01/22/2024 08:55 AM    Hemoglobin 15.1 07/07/2023 07:31 AM    Hemoglobin 12.4 03/08/2023 04:51 AM    Hemoglobin 12.9 03/07/2023 04:52 AM    Hemoglobin 17.2 (H) 03/06/2023 04:27 PM    Hgb, i-STAT 18.7 (H) 03/06/2023 04:47 PM    Hematocrit 37.0 03/08/2023 04:51 AM    Hematocrit 37.9 03/07/2023 04:52 AM    Hematocrit 53.8 (H) 03/06/2023 04:27 PM    HCT 43.8 01/22/2024 08:55 AM    HCT 44.9 07/07/2023 07:31 AM    Hct, i-STAT 55 (H) 03/06/2023 04:47 PM    MCV 88.7 01/22/2024 08:55 AM    MCV 90.7 07/07/2023 07:31 AM    MCV 90 03/08/2023 04:51 AM    MCV 89 03/07/2023 04:52 AM    MCV 94 03/06/2023 04:27 PM    Platelet Count 232 01/22/2024 08:55 AM    Platelet Count 206 07/07/2023 07:31 AM    Platelets 168 03/08/2023 04:51 AM    Platelets 218 03/07/2023 04:52 AM    Platelets 316 03/06/2023 04:27 PM    BUN 25 01/22/2024 08:55 AM    BUN 23 07/07/2023 07:31 AM    BUN 18 03/08/2023 04:51 AM    BUN 23 03/07/2023 04:52 AM    BUN 26 (H) 03/07/2023 12:03 AM    Potassium 3.9 01/22/2024 08:55 AM    Potassium 4.2 07/07/2023 07:31 AM    Potassium 4.6 03/08/2023 04:51 AM    Potassium 3.5 03/07/2023 04:52 AM    Potassium 4.5 03/07/2023 12:03 AM    Chloride 105 01/22/2024 08:55 AM    Chloride 103 07/07/2023 07:31 AM    Chloride 102 03/08/2023 04:51 AM    Chloride 108 03/07/2023 04:52 AM     "Chloride 107 03/07/2023 12:03 AM    CO2 28 01/22/2024 08:55 AM    CO2 30 07/07/2023 07:31 AM    CO2 25 03/08/2023 04:51 AM    CO2 24 03/07/2023 04:52 AM    CO2 21 03/07/2023 12:03 AM    CO2, i-STAT 15 (L) 03/06/2023 04:47 PM    Creatinine 0.94 01/22/2024 08:55 AM    Creatinine 1.15 07/07/2023 07:31 AM    Creatinine 0.83 03/08/2023 04:51 AM    Creatinine 0.99 03/07/2023 04:52 AM    Creatinine 1.03 03/07/2023 12:03 AM    AST 16 01/22/2024 08:55 AM    AST 13 07/07/2023 07:31 AM    AST 20 03/06/2023 04:27 PM    ALT 21 01/22/2024 08:55 AM    ALT 11 07/07/2023 07:31 AM    ALT 26 03/06/2023 04:27 PM    Total Protein 8.0 03/06/2023 04:27 PM    Protein, Total 6.5 01/22/2024 08:55 AM    Protein, Total 6.5 07/07/2023 07:31 AM    Albumin 4.5 01/22/2024 08:55 AM    Albumin 4.6 07/07/2023 07:31 AM    Albumin 5.1 (H) 03/06/2023 04:27 PM    Globulin 2.0 01/22/2024 08:55 AM    Globulin 1.9 07/07/2023 07:31 AM    HDL 60 07/07/2023 07:31 AM    Triglycerides 44 07/07/2023 07:31 AM       Portions of the record may have been created with voice recognition software. Occasional wrong word or \"sound a like\" substitutions may have occurred due to the inherent limitations of voice recognition software. Read the chart carefully and recognize, using context, where substitutions have occurred.    "

## 2024-01-26 NOTE — PATIENT INSTRUCTIONS
Be mindful of diet.     Please stay hydrated with water.     For now, continue Lantus 24 units.     STAY CONSISTENT WITH LANTUS DOSES !!!!     Switch to FiASP but continue 6 units at breakfast and lunch.     Be consistent with 12 units at dinner and may increase to 14 units with bigger dinners.     Continue to utilize the DEX COM and send a record to the office in 2 weeks for review.     Obtain lab work as prescribed.

## 2024-01-29 ENCOUNTER — DOCUMENTATION (OUTPATIENT)
Dept: ENDOCRINOLOGY | Facility: HOSPITAL | Age: 24
End: 2024-01-29

## 2024-02-21 ENCOUNTER — TELEPHONE (OUTPATIENT)
Dept: ENDOCRINOLOGY | Facility: HOSPITAL | Age: 24
End: 2024-02-21

## 2024-02-23 NOTE — TELEPHONE ENCOUNTER
Overall Hoang's Dexcom download looks okay with an average glucose of 167.  He continues to have some variability at the end of his day which is typically related to his dietary habits.  For now, continue current regimen.

## 2024-02-29 ENCOUNTER — OFFICE VISIT (OUTPATIENT)
Dept: URGENT CARE | Facility: CLINIC | Age: 24
End: 2024-02-29
Payer: COMMERCIAL

## 2024-02-29 ENCOUNTER — TELEPHONE (OUTPATIENT)
Dept: DIABETES SERVICES | Facility: CLINIC | Age: 24
End: 2024-02-29

## 2024-02-29 VITALS
HEART RATE: 100 BPM | DIASTOLIC BLOOD PRESSURE: 62 MMHG | BODY MASS INDEX: 23.98 KG/M2 | HEIGHT: 72 IN | OXYGEN SATURATION: 97 % | RESPIRATION RATE: 16 BRPM | SYSTOLIC BLOOD PRESSURE: 118 MMHG | TEMPERATURE: 99.6 F | WEIGHT: 177 LBS

## 2024-02-29 DIAGNOSIS — J01.00 ACUTE MAXILLARY SINUSITIS, RECURRENCE NOT SPECIFIED: Primary | ICD-10-CM

## 2024-02-29 DIAGNOSIS — J02.9 ACUTE PHARYNGITIS, UNSPECIFIED ETIOLOGY: ICD-10-CM

## 2024-02-29 PROCEDURE — 99213 OFFICE O/P EST LOW 20 MIN: CPT | Performed by: PHYSICIAN ASSISTANT

## 2024-02-29 RX ORDER — AMOXICILLIN 500 MG/1
500 CAPSULE ORAL EVERY 8 HOURS SCHEDULED
Qty: 30 CAPSULE | Refills: 0 | Status: SHIPPED | OUTPATIENT
Start: 2024-02-29 | End: 2024-03-10

## 2024-02-29 NOTE — TELEPHONE ENCOUNTER
Doretha left a message that Hoang's insurance could not find [id number] in their system but [phone] was partially covered. Hoang is interested in carb counting and flex insulin. I spoke to juany and mnt does cover carb counting in the appointment so we can schedule him for that but flex insulin is only taught as a dmse ([id number]).    I left Doretha a message to call back, I did not leave any info on the vm.

## 2024-02-29 NOTE — TELEPHONE ENCOUNTER
Called and spoke to patient to advise. He will discuss with his mother and cb if they want to schedule

## 2024-02-29 NOTE — LETTER
February 29, 2024     Patient: Johnny Roper   YOB: 2000   Date of Visit: 2/29/2024       To Whom It May Concern:    It is my medical opinion that Johnny Roper may return to work on 3/2/2024 .    If you have any questions or concerns, please don't hesitate to call.         Sincerely,        Adama Guo Jr, PRERNA    CC: No Recipients

## 2024-02-29 NOTE — PROGRESS NOTES
[facility]        NAME: Johnny Roper is a 23 y.o. male  : 2000    MRN: [id number]  DATE: 2024  TIME: 9:08 AM    /62   Pulse 100   Temp 99.6 °F (37.6 °C)   Resp 16   Ht 6' (1.829 m)   Wt 80.3 kg (177 lb)   SpO2 97%   BMI 24.01 kg/m²     Assessment and Plan   Acute maxillary sinusitis, recurrence not specified [J01.00]  1. Acute maxillary sinusitis, recurrence not specified  amoxicillin (AMOXIL) 500 mg capsule      2. Acute pharyngitis, unspecified etiology  amoxicillin (AMOXIL) 500 mg capsule            Patient Instructions       Follow up with PCP in 3-5 days.  Proceed to  ER if symptoms worsen.    Chief Complaint     Chief Complaint   Patient presents with    Cold Like Symptoms     Pt reports yesterday he developed body aches, HA, cough, sore throat and temp of 100.8. Needs a work note. Declined covid/flu testing.          History of Present Illness       Pt with sore throat fever body aches congestion cough         Review of Systems   Review of Systems   Constitutional:  Positive for fatigue and fever.   HENT:  Positive for sinus pressure, sinus pain and sore throat.    Eyes: Negative.    Respiratory: Negative.     Cardiovascular: Negative.    Gastrointestinal: Negative.    Endocrine: Negative.    Genitourinary: Negative.    Musculoskeletal:  Positive for myalgias.   Skin: Negative.    Allergic/Immunologic: Negative.    Neurological: Negative.    Hematological: Negative.    Psychiatric/Behavioral: Negative.     All other systems reviewed and are negative.        Current Medications       Current Outpatient Medications:     amoxicillin (AMOXIL) 500 mg capsule, Take 1 capsule (500 mg total) by mouth every 8 (eight) hours for 10 days, Disp: 30 capsule, Rfl: 0    insulin aspart, w/niacinamide, (Fiasp FlexTouch) 100 Units/mL injection pen, 6 units at breakfast and lunch with 12 units at dinner, Disp: 15 mL, Rfl: 6    insulin glargine (LANTUS) 100 units/mL subcutaneous  injection, Inject 24 Units under the skin daily at bedtime, Disp: 10 mL, Rfl: 0    Accu-Chek Softclix Lancets lancets, Check blood sugars 4 times a day, Disp: 400 each, Rfl: 2    acetone, urine, test strip, 1 strip by Does not apply route as needed for high blood sugar, Disp: 25 each, Rfl: 4    BD Pen Needle Renita 2nd Gen 32G X 4 MM MISC, USE WITH INSULIN UP TO 5 TIMES DAILY AS DIRECTED, Disp: 500 each, Rfl: 3    Continuous Blood Gluc  (DEXCOM G6 ) SHAYNA, Use  to scan blood sugars daily., Disp: 1 Device, Rfl: 0    Continuous Blood Gluc Sensor (Dexcom G6 Sensor) MISC, Replace sensors every 10 days, Disp: 3 each, Rfl: 10    Continuous Blood Gluc Transmit (Dexcom G6 Transmitter) MISC, USE AS DIRECTED TO TEST BLOOD SUGARS, Disp: 1 each, Rfl: 3    glucagon (GLUCAGON EMERGENCY) 1 MG injection, Inject 1 mg under the skin once as needed for low blood sugar for up to 1 dose, Disp: 1 each, Rfl: 5    insulin aspart (NovoLOG FlexPen) 100 UNIT/ML injection pen, INJECT 6 UNITS AT BREAKFAST AND LUNCH, 12 UNITS AT DINNER (Patient not taking: Reported on 2/29/2024), Disp: 15 mL, Rfl: 4    neomycin-polymyxin-hydrocortisone (CORTISPORIN) 1 % SOLN, Administer 3 drops to the right ear every 8 (eight) hours for 3 days, Disp: 1.4 mL, Rfl: 0    Current Allergies     Allergies as of 02/29/2024    (No Known Allergies)            The following portions of the patient's history were reviewed and updated as appropriate: allergies, current medications, past family history, past medical history, past social history, past surgical history and problem list.     Past Medical History:   Diagnosis Date    Diabetes mellitus (HCC)     Fracture of phalanx of toe     Resolved 2/1/2016        History reviewed. No pertinent surgical history.    Family History   Problem Relation Age of Onset    Diabetes Family     No Known Problems Mother     No Known Problems Father     No Known Problems Sister     No Known Problems Brother     No  Known Problems Brother     No Known Problems Brother     No Known Problems Sister          Medications have been verified.        Objective   /62   Pulse 100   Temp 99.6 °F (37.6 °C)   Resp 16   Ht 6' (1.829 m)   Wt 80.3 kg (177 lb)   SpO2 97%   BMI 24.01 kg/m²        Physical Exam     Physical Exam  Vitals and nursing note reviewed.   Constitutional:       Appearance: Normal appearance. He is normal weight.      Comments: Pt glucose 182 as per pt glucose monitor     Pt declines covid and flu and strep testing   pt urged to have testing  pt still declines    HENT:      Head: Normocephalic and atraumatic.      Right Ear: Tympanic membrane, ear canal and external ear normal.      Left Ear: Tympanic membrane, ear canal and external ear normal.      Nose: Congestion and rhinorrhea present.      Comments: Yellow d/c  max sinus tender to palp  + yellow post nasal drip   Eyes:      Extraocular Movements: Extraocular movements intact.      Conjunctiva/sclera: Conjunctivae normal.      Pupils: Pupils are equal, round, and reactive to light.   Cardiovascular:      Rate and Rhythm: Normal rate and regular rhythm.      Pulses: Normal pulses.      Heart sounds: Normal heart sounds.   Pulmonary:      Effort: Pulmonary effort is normal.      Breath sounds: Normal breath sounds.   Abdominal:      General: Abdomen is flat.      Palpations: Abdomen is soft.   Musculoskeletal:         General: Normal range of motion.      Cervical back: Normal range of motion and neck supple.   Skin:     General: Skin is warm.      Capillary Refill: Capillary refill takes less than 2 seconds.   Neurological:      Mental Status: He is alert and oriented to person, place, and time.

## 2024-03-04 ENCOUNTER — TELEPHONE (OUTPATIENT)
Dept: ENDOCRINOLOGY | Facility: CLINIC | Age: 24
End: 2024-03-04

## 2024-03-06 ENCOUNTER — TELEPHONE (OUTPATIENT)
Dept: DIABETES SERVICES | Facility: CLINIC | Age: 24
End: 2024-03-06

## 2024-03-06 DIAGNOSIS — E10.10 TYPE 1 DIABETES MELLITUS WITH KETOACIDOSIS WITHOUT COMA (HCC): Primary | ICD-10-CM

## 2024-03-06 NOTE — TELEPHONE ENCOUNTER
Pt of Moustapha's was scheduled for mnt with flex insulin to follow. Please see 2/29 phone note. Michelle is agreeable to this. Could you please issue a referral that states mnt & flex insulin. Thanks

## 2024-04-03 ENCOUNTER — TELEPHONE (OUTPATIENT)
Dept: DIABETES SERVICES | Facility: HOSPITAL | Age: 24
End: 2024-04-03

## 2024-04-03 ENCOUNTER — OFFICE VISIT (OUTPATIENT)
Dept: DIABETES SERVICES | Facility: HOSPITAL | Age: 24
End: 2024-04-03

## 2024-04-03 VITALS — HEIGHT: 72 IN | BODY MASS INDEX: 23.98 KG/M2 | WEIGHT: 177 LBS

## 2024-04-03 DIAGNOSIS — E10.65 TYPE 1 DIABETES MELLITUS WITH HYPERGLYCEMIA (HCC): Primary | ICD-10-CM

## 2024-04-03 DIAGNOSIS — E10.10 TYPE 1 DIABETES MELLITUS WITH KETOACIDOSIS WITHOUT COMA (HCC): Primary | ICD-10-CM

## 2024-04-03 RX ORDER — ACYCLOVIR 400 MG/1
1 TABLET ORAL
Qty: 9 EACH | Refills: 3 | Status: SHIPPED | OUTPATIENT
Start: 2024-04-03

## 2024-04-03 RX ORDER — INSULIN ASPART INJECTION 100 [IU]/ML
INJECTION, SOLUTION SUBCUTANEOUS
Qty: 30 ML | Refills: 6 | Status: SHIPPED | OUTPATIENT
Start: 2024-04-03

## 2024-04-03 NOTE — TELEPHONE ENCOUNTER
Met with Hoang for MNT today. He would like to transition to the Dexcom G7 sensor. Can you please order sensors [facility] his [facility] on file?     He is also out of Fiasp, he has been taking more with holidays and social events. He has been using his Novolog that he still has left. Can you please reorder? We will be working to transition him to flexible insulin, so can we order for more? Up to 50u a day should work.     Thanks!     -Michelle

## 2024-04-03 NOTE — PROGRESS NOTES
"Medical Nutrition Therapy     Assessment    Visit Type: Initial visit  Chief complaint:  Type 1 Diabetes    HPI: Met with Hoang and his mom for initial medical nutrition therapy.  Hoang has had type 1 diabetes for few years, current A1c 6.9%, doing fairly well.  Here today for help with eating, carbohydrate counting, and general diabetes management.  We reviewed carbohydrate counting today and the importance of looking at labels and being aware of portions.  The goal is to eventually move him over to flexible insulin therapy.  We discussed this in concept today, but he is not ready to make the transition to flexible insulin until he is more detailed with looking at carbs and portions.  He does not eat a significant amount of carbs, sticks with foods that he knows and knows works pretty well.  Plan is for him to complete a 3 day food record and return to me for review. We can look to move towards flexible insulin at that time. He would also like to transition to G7 from G6, Moustapha ordered them while in office today.  He also needed more insulin which I believe was ordered as well.     Ht Readings from Last 1 Encounters:   04/03/24 6' (1.829 m)     Wt Readings from Last 3 Encounters:   04/03/24 80.3 kg (177 lb)   02/29/24 80.3 kg (177 lb)   01/26/24 79.2 kg (174 lb 9.6 oz)        Body mass index is 24.01 kg/m².     Lab Results   Component Value Date    HGBA1C 6.9 (H) 01/22/2024    HGBA1C 7.2 (H) 07/07/2023    HGBA1C 8.9 (H) 03/06/2023       No results found for: \"CHOL\"  Lab Results   Component Value Date    HDL 60 07/07/2023    HDL 68 12/08/2022    HDL 58 05/26/2022     Lab Results   Component Value Date    LDLCALC 103 (H) 07/07/2023    LDLCALC 109 (H) 12/08/2022    LDLCALC 101 (H) 05/26/2022     Lab Results   Component Value Date    TRIG 44 07/07/2023    TRIG 75 12/08/2022    TRIG 80 05/26/2022     No results found for: \"CHOLHDL\"    Weight Change: No    Medical Diagnosis/ICD 10 Code:  E10.10    Barriers to Learning: no " barriers    Do you follow any special diet presently?: No  Who shops: patient and mother  Who cooks: patient and mother    Food Log: Completed via the method of food recall    Breakfast: up around 8am: breakfast tries to wait a little, 9am leaves at 9:30.  Eats: eggs or chicken  wasa crips 2-3 carbs. Tea or coffee sometimes. Eggs and presley and the crisps, juana not as much a few a month 2 sizzlies crisant rare bagel the waffle. Insulin before eating   Morning Snack:glucose tablets if having a low at 200 would take 3-4u  Lunch:1:30: salad and chicken most of the time but getting old, fish ham  Afternoon Snack:  nothing usually, meat and cheese, and glucose tabs,   Dinner:8ish meat starch veggie hot meal,   Evening Snack: if still hungry- meat and cheese, pork rinds sometimes,   Beverages: water, tea at dinner, coffee with milk and energy drink more often sugar free, soda zero cream soda, peach tea diet, no milk, no juice.   Eating out/Take out:some   Exercise ADL, gym in spurts    Nutrition Diagnosis:  Food and nutrition related knowledge deficit  related to Lack of prior exposure to accurate nutrition related information as evidenced by Verbalizes inaccurate or incomplete information    Intervention: plate method, label reading, behavior modification strategies, carbohydrate counting, and meal planning     Treatment Goals: Patient understands education and recommendations    Education Material Given  Johnny was provided the Portion Booklet    Monitoring and evaluation:    Term code indicator  FH 1.6.3 Carbohydrate Intake Criteria: Accuracy of carb counting    Patient’s Response to Instruction:  Comprehensiongood  Motivationgood  Expected Compliancegood    Thank you for coming to the [facility] for education today.  Please feel free to call with any questions or concerns.    Michelle Lorenzo, RD  [address]  [facility] [id number]

## 2024-04-08 ENCOUNTER — TELEPHONE (OUTPATIENT)
Age: 24
End: 2024-04-08

## 2024-04-08 NOTE — TELEPHONE ENCOUNTER
The pt's mother called asking if the office had samples of the Fiasp pen as her son got one in the past. He is going on a trip today [place] and needs one. I reached out to the office and they no longer carry the pens but had vials. Per the pt and his mom he is not comfortable with that and requested that I ask again if there were any pen samples. I confirmed again that there is not and relayed that back to the pt and his mom and they verbalized understanding.

## 2024-05-17 ENCOUNTER — OFFICE VISIT (OUTPATIENT)
Dept: ENDOCRINOLOGY | Facility: HOSPITAL | Age: 24
End: 2024-05-17
Payer: COMMERCIAL

## 2024-05-17 VITALS
HEART RATE: 98 BPM | BODY MASS INDEX: 24.19 KG/M2 | SYSTOLIC BLOOD PRESSURE: 118 MMHG | WEIGHT: 178.6 LBS | DIASTOLIC BLOOD PRESSURE: 76 MMHG | HEIGHT: 72 IN

## 2024-05-17 DIAGNOSIS — E10.10 TYPE 1 DIABETES MELLITUS WITH KETOACIDOSIS WITHOUT COMA (HCC): ICD-10-CM

## 2024-05-17 DIAGNOSIS — E10.65 TYPE 1 DIABETES MELLITUS WITH HYPERGLYCEMIA (HCC): Primary | ICD-10-CM

## 2024-05-17 DIAGNOSIS — E10.9 DM W/O COMPLICATION TYPE I (HCC): ICD-10-CM

## 2024-05-17 DIAGNOSIS — E11.10 DKA (DIABETIC KETOACIDOSIS) (HCC): ICD-10-CM

## 2024-05-17 DIAGNOSIS — E78.41 ELEVATED LIPOPROTEIN(A): ICD-10-CM

## 2024-05-17 DIAGNOSIS — E10.9 TYPE 1 DIABETES MELLITUS WITHOUT COMPLICATION (HCC): ICD-10-CM

## 2024-05-17 PROCEDURE — 99214 OFFICE O/P EST MOD 30 MIN: CPT | Performed by: NURSE PRACTITIONER

## 2024-05-17 PROCEDURE — 95251 CONT GLUC MNTR ANALYSIS I&R: CPT | Performed by: NURSE PRACTITIONER

## 2024-05-17 RX ORDER — INSULIN GLARGINE 100 [IU]/ML
24 INJECTION, SOLUTION SUBCUTANEOUS
Qty: 10 ML | Refills: 6 | Status: SHIPPED | OUTPATIENT
Start: 2024-05-17

## 2024-05-17 RX ORDER — INSULIN ASPART INJECTION 100 [IU]/ML
INJECTION, SOLUTION SUBCUTANEOUS
Qty: 30 ML | Refills: 6 | Status: SHIPPED | OUTPATIENT
Start: 2024-05-17

## 2024-05-17 NOTE — PATIENT INSTRUCTIONS
Be mindful of diet.     Please stay hydrated with water.     For now, continue Lantus 24 units.     STAY CONSISTENT WITH LANTUS DOSES !!!!     Switch to FiASP but continue 6 units at breakfast and lunch.     Be consistent with 12 units at dinner and may increase to 14-15 units with bigger dinners.     Continue to utilize the DEX COM and send a record to the office in 2 weeks for review.     Obtain lab work as prescribed.

## 2024-05-17 NOTE — PROGRESS NOTES
Johnny Roper 24 y.o. male MRN: [id number]    Encounter: [id number]      Assessment & Plan     Assessment:  This is a 24 y.o.-year-old male with type 1 diabetes.     Plan:  1. Type 1 diabetes: No recent Hemoglobin A1c.  Download of his dex com reveals some hyperglycemia after dinner and overnight. For now, continue FiASP 6 units at breakfast and lunch and increase to 14-15 units at dinner.  He will continue Lantus at current dose.  I have asked him to upload his Dexcom in 1-2 weeks so that it can be reviewed so changes can be made to help his glycemic control throughout the day.  Reviewed the metabolic consequences of uncontrolled diabetes including neuropathy, nephropathy and retinopathy.  Check hemoglobin A1c and comprehensive metabolic panel now and prior to next visit.     2.  Hyperlipidemia:  We will continue surveillance with lab work.    CC: Type 2 Diabetes follow up    History of Present Illness     HPI:  23 y.o. male with type 1 diabetes diagnosed approximately 3-4 years ago.  He was admitted to the hospital in early November in diabetic ketoacidosis and was found to have type 1 diabetes.   His most recent hemoglobin A1c from January 22, 2024 is 6.9.  He is currently utilizing Novolog 6 units at breakfast and lunch with 12 units at dinner with Basaglar 22 units at bedtime.  He was hospitalized for DKA on March 6, 2023. Currently, he denies chest pain or shortness of breath. He obtained a diabetic eye exam on March 20, 2019 which showed no retinopathy.  His most recent diabetic foot exam was performed on November 5, 2020. He denies neuropathy, nephropathy, retinopathy, heart attack, stroke and claudication but does admit to none.  Recently returned from a mission trip to [place].     Hypoglycemic episodes: Rare.     Blood Sugar/Glucometer/Pump/CGM review: Dex com download from May 3 through May 16, 2024 reveals an average glucose of 177 with a standard deviation of at 69. He is in target range 54% of the  time with <4% low or very low and 42% high or very high readings.     Review of Systems   Constitutional: Negative.  Negative for chills, fatigue and fever.   HENT: Negative.  Negative for trouble swallowing and voice change.    Eyes:  Negative for photophobia, pain, discharge, redness, itching and visual disturbance.   Respiratory:  Negative for cough and shortness of breath.    Cardiovascular:  Negative for chest pain and palpitations.   Gastrointestinal:  Negative for abdominal pain, constipation, diarrhea, nausea and vomiting.   Endocrine: Negative for cold intolerance, heat intolerance, polydipsia, polyphagia and polyuria.   Genitourinary: Negative.    Musculoskeletal: Negative.    Skin: Negative.    Allergic/Immunologic: Negative.    Neurological:  Negative for dizziness, syncope, light-headedness and headaches.   Hematological: Negative.    Psychiatric/Behavioral: Negative.     All other systems reviewed and are negative.      Historical Information   Past Medical History:   Diagnosis Date    Diabetes mellitus (HCC)     Fracture of phalanx of toe     Resolved 2/1/2016      History reviewed. No pertinent surgical history.  Social History   Social History     Substance and Sexual Activity   Alcohol Use Yes    Comment: social     Social History     Substance and Sexual Activity   Drug Use No     Social History     Tobacco Use   Smoking Status Never   Smokeless Tobacco Never     Family History:   Family History   Problem Relation Age of Onset    Diabetes Family     No Known Problems Mother     No Known Problems Father     No Known Problems Sister     No Known Problems Brother     No Known Problems Brother     No Known Problems Brother     No Known Problems Sister        Meds/Allergies   Current Outpatient Medications   Medication Sig Dispense Refill    Accu-Chek Softclix Lancets lancets Check blood sugars 4 times a day 400 each 2    BD Pen Needle Renita 2nd Gen 32G X 4 MM MISC USE WITH INSULIN UP TO 5 TIMES DAILY  AS DIRECTED 500 each 3    Continuous Blood Gluc Sensor (Dexcom G7 Sensor) Use 1 Device every 10 days 9 each 3    glucagon (GLUCAGON EMERGENCY) 1 MG injection Inject 1 mg under the skin once as needed for low blood sugar for up to 1 dose 1 each 5    insulin aspart, w/niacinamide, (Fiasp FlexTouch) 100 Units/mL injection pen Utilize up to 50 units daily 30 mL 6    insulin glargine (LANTUS) 100 units/mL subcutaneous injection Inject 24 Units under the skin daily at bedtime 10 mL 0    acetone, urine, test strip 1 strip by Does not apply route as needed for high blood sugar (Patient not taking: Reported on 5/17/2024) 25 each 4    Continuous Blood Gluc  (DEXCOM G6 ) SHAYNA Use  to scan blood sugars daily. (Patient not taking: Reported on 5/17/2024) 1 Device 0    Continuous Blood Gluc Transmit (Dexcom G6 Transmitter) MISC USE AS DIRECTED TO TEST BLOOD SUGARS (Patient not taking: Reported on 5/17/2024) 1 each 3    insulin aspart (NovoLOG FlexPen) 100 UNIT/ML injection pen INJECT 6 UNITS AT BREAKFAST AND LUNCH, 12 UNITS AT DINNER (Patient not taking: Reported on 2/29/2024) 15 mL 4    neomycin-polymyxin-hydrocortisone (CORTISPORIN) 1 % SOLN Administer 3 drops to the right ear every 8 (eight) hours for 3 days 1.4 mL 0     No current facility-administered medications for this visit.     No Known Allergies    Objective   Vitals: Height 6' (1.829 m), weight 81 kg (178 lb 9.6 oz).    Physical Exam  Vitals reviewed.   Constitutional:       Appearance: He is well-developed.   HENT:      Head: Normocephalic and atraumatic.      Nose: Nose normal.   Eyes:      Conjunctiva/sclera: Conjunctivae normal.      Pupils: Pupils are equal, round, and reactive to light.   Cardiovascular:      Rate and Rhythm: Normal rate and regular rhythm.      Heart sounds: Normal heart sounds.   Pulmonary:      Effort: Pulmonary effort is normal.      Breath sounds: Normal breath sounds.   Abdominal:      General: Bowel sounds are  "normal.      Palpations: Abdomen is soft.   Musculoskeletal:         General: Normal range of motion.      Cervical back: Normal range of motion and neck supple.   Skin:     General: Skin is warm and dry.   Neurological:      Mental Status: He is alert and oriented to person, place, and time.   Psychiatric:         Behavior: Behavior normal.         Thought Content: Thought content normal.         Judgment: Judgment normal.         Lab Results:   Lab Results   Component Value Date/Time    Hemoglobin A1C 6.9 (H) 01/22/2024 08:55 AM    Hemoglobin A1C 7.2 (H) 07/07/2023 07:31 AM    White Blood Cell Count 4.2 01/22/2024 08:55 AM    White Blood Cell Count 3.6 (L) 07/07/2023 07:31 AM    Hemoglobin 14.7 01/22/2024 08:55 AM    Hemoglobin 15.1 07/07/2023 07:31 AM    HCT 43.8 01/22/2024 08:55 AM    HCT 44.9 07/07/2023 07:31 AM    MCV 88.7 01/22/2024 08:55 AM    MCV 90.7 07/07/2023 07:31 AM    Platelet Count 232 01/22/2024 08:55 AM    Platelet Count 206 07/07/2023 07:31 AM    BUN 25 01/22/2024 08:55 AM    BUN 23 07/07/2023 07:31 AM    Potassium 3.9 01/22/2024 08:55 AM    Potassium 4.2 07/07/2023 07:31 AM    Chloride 105 01/22/2024 08:55 AM    Chloride 103 07/07/2023 07:31 AM    CO2 28 01/22/2024 08:55 AM    CO2 30 07/07/2023 07:31 AM    Creatinine 0.94 01/22/2024 08:55 AM    Creatinine 1.15 07/07/2023 07:31 AM    AST 16 01/22/2024 08:55 AM    AST 13 07/07/2023 07:31 AM    ALT 21 01/22/2024 08:55 AM    ALT 11 07/07/2023 07:31 AM    Protein, Total 6.5 01/22/2024 08:55 AM    Protein, Total 6.5 07/07/2023 07:31 AM    Albumin 4.5 01/22/2024 08:55 AM    Albumin 4.6 07/07/2023 07:31 AM    Globulin 2.0 01/22/2024 08:55 AM    Globulin 1.9 07/07/2023 07:31 AM    HDL 60 07/07/2023 07:31 AM    Triglycerides 44 07/07/2023 07:31 AM       Portions of the record may have been created with voice recognition software. Occasional wrong word or \"sound a like\" substitutions may have occurred due to the inherent limitations of voice recognition " software. Read the chart carefully and recognize, using context, where substitutions have occurred.

## 2024-07-29 ENCOUNTER — TELEPHONE (OUTPATIENT)
Age: 24
End: 2024-07-29

## 2024-07-29 NOTE — TELEPHONE ENCOUNTER
Patient called requesting refill for Fiasp Flextouch Insulin. Patient made aware medication was refilled on 05/17/24 for 30ml with 6 refills to [facility] pharmacy. Patient instructed to contact the pharmacy to obtain refills of medication. Patient verbalized understanding.

## 2024-08-26 ENCOUNTER — TELEPHONE (OUTPATIENT)
Age: 24
End: 2024-08-26

## 2024-08-26 DIAGNOSIS — E10.9 TYPE 1 DIABETES MELLITUS WITHOUT COMPLICATION (HCC): Primary | ICD-10-CM

## 2024-08-26 RX ORDER — INSULIN LISPRO 100 [IU]/ML
INJECTION, SOLUTION INTRAVENOUS; SUBCUTANEOUS
Qty: 30 ML | Refills: 0 | Status: SHIPPED | OUTPATIENT
Start: 2024-08-26

## 2024-08-26 NOTE — TELEPHONE ENCOUNTER
Pt called asking for samples of fiasp, called office. No samples  he has been out of it about 1 month he states.  Does he need something else called in?

## 2024-08-26 NOTE — TELEPHONE ENCOUNTER
Fiasp is unavailable and we have no samples. He has been out of insulin for a month. Can you please fill Humalog to see if it will be approved?

## 2024-08-29 ENCOUNTER — TELEPHONE (OUTPATIENT)
Dept: ENDOCRINOLOGY | Facility: HOSPITAL | Age: 24
End: 2024-08-29

## 2024-08-30 ENCOUNTER — TELEMEDICINE (OUTPATIENT)
Dept: ENDOCRINOLOGY | Facility: HOSPITAL | Age: 24
End: 2024-08-30
Payer: COMMERCIAL

## 2024-08-30 DIAGNOSIS — E10.9 TYPE 1 DIABETES MELLITUS WITHOUT COMPLICATION (HCC): Primary | ICD-10-CM

## 2024-08-30 DIAGNOSIS — E10.10 TYPE 1 DIABETES MELLITUS WITH KETOACIDOSIS WITHOUT COMA (HCC): ICD-10-CM

## 2024-08-30 DIAGNOSIS — E10.9 DM W/O COMPLICATION TYPE I (HCC): ICD-10-CM

## 2024-08-30 DIAGNOSIS — E10.65 TYPE 1 DIABETES MELLITUS WITH HYPERGLYCEMIA (HCC): ICD-10-CM

## 2024-08-30 PROCEDURE — 95251 CONT GLUC MNTR ANALYSIS I&R: CPT | Performed by: NURSE PRACTITIONER

## 2024-08-30 PROCEDURE — 99214 OFFICE O/P EST MOD 30 MIN: CPT | Performed by: NURSE PRACTITIONER

## 2024-08-30 RX ORDER — INSULIN ASPART 100 [IU]/ML
INJECTION, SOLUTION INTRAVENOUS; SUBCUTANEOUS
Qty: 15 ML | Refills: 4 | Status: SHIPPED | OUTPATIENT
Start: 2024-08-30

## 2024-08-30 NOTE — PATIENT INSTRUCTIONS
Be mindful of diet.     Please stay hydrated with water.     For now, continue Lantus 24 units.     STAY CONSISTENT WITH LANTUS DOSES !!!!     Switch to FiASP but continue 6 units at breakfast and lunch.     Be consistent with 10 units at dinner and may increase to 12 units with bigger dinners.     Continue to utilize the DEX COM and send a record to the office in 2 weeks for review.     Obtain lab work as prescribed.

## 2024-08-30 NOTE — PROGRESS NOTES
Virtual Regular Visit  Name: Johnny Roper      : 2000      MRN: [id number]  Encounter Provider: RADHA Ford  Encounter Date: 2024   Encounter department: [facility]    Verification of patient location:    Patient is located at Home in the following state in which I hold an active license [place]    Assessment & Plan   1. Type 1 diabetes mellitus without complication (HCC)  2. Type 1 diabetes mellitus with ketoacidosis without coma (HCC)  3. Type 1 diabetes mellitus with hyperglycemia (HCC)  4. DM w/o complication type I (HCC)        Encounter provider RADHA Ford    The patient was identified by name and date of birth. Johnny Roper was informed that this is a telemedicine visit and that the visit is being conducted through the Epic Embedded platform. He agrees to proceed..  My office door was closed. No one else was in the room.  He acknowledged consent and understanding of privacy and security of the video platform. The patient has agreed to participate and understands they can discontinue the visit at any time.    Patient is aware this is a billable service.     History of Present Illness     Johnny Roper is a 24 y.o. male who presents  with type 1 diabetes diagnosed approximately 3-4 years ago.  He was admitted to the hospital in early November in diabetic ketoacidosis and was found to have type 1 diabetes.  There is no recent hemoglobin A1c.  He is currently utilizing Novolog 6 units at breakfast and lunch with 12 units at dinner with Basaglar 22 units at bedtime.  He was hospitalized for DKA on 2023. Currently, he denies chest pain or shortness of breath. He obtained a diabetic eye exam on 2019 which showed no retinopathy.  His most recent diabetic foot exam was performed on 2020. He denies neuropathy, nephropathy, retinopathy, heart attack, stroke and claudication but does admit to none.   Recently returned from a mission trip to [place] then [place].     Hypoglycemic episodes: Rare.     Blood Sugar/Glucometer/Pump/CGM review: Dex com download from August 16 through August 29, 2024 reveals an average glucose of 165 with a standard deviation of at 73. He is in target range 65% of the time with <3% low or very low and 33% high or very high readings.    Review of Systems   Constitutional: Negative.  Negative for chills, fatigue and fever.   HENT: Negative.  Negative for trouble swallowing and voice change.    Eyes:  Negative for photophobia, pain, discharge, redness, itching and visual disturbance.   Respiratory:  Negative for cough and shortness of breath.    Cardiovascular:  Negative for chest pain and palpitations.   Gastrointestinal:  Negative for abdominal pain, constipation, diarrhea, nausea and vomiting.   Endocrine: Negative for heat intolerance, polydipsia, polyphagia and polyuria.   Genitourinary: Negative.    Musculoskeletal: Negative.    Skin: Negative.    Allergic/Immunologic: Negative.    Neurological:  Negative for syncope, light-headedness and headaches.   Hematological: Negative.    Psychiatric/Behavioral: Negative.     All other systems reviewed and are negative.      Objective     There were no vitals taken for this visit.    Physical Exam    Physical Exam   Constitutional: He is oriented to person, place, and time. He appears well-developed and well-nourished. No distress.   HENT:   Head: Normocephalic and atraumatic.   Neck: Normal range of motion.   Pulmonary/Chest: Effort normal.   Musculoskeletal: Normal range of motion.   Neurological: He is alert and oriented to person, place, and time.   Skin: He is not diaphoretic.   Psychiatric: He has a normal mood and affect. His behavior is normal.     Plan:  1. Type 1 diabetes: No recent Hemoglobin A1c.  Download of his dex com reveals some hypoglycemia after dinner and overnight. For now, continue FiASP 6 units at breakfast and lunch and  decrease to 10 units at dinner.  He will continue Lantus at current dose.  I have asked him to upload his Dexcom in 1-2 weeks so that it can be reviewed so changes can be made to help his glycemic control throughout the day.  Reviewed the metabolic consequences of uncontrolled diabetes including neuropathy, nephropathy and retinopathy.  Check hemoglobin A1c and comprehensive metabolic panel now and prior to next visit.     2.  Hyperlipidemia:  We will continue surveillance with lab work.    Visit Time  Total Visit Duration: 30 minutes

## 2024-10-08 LAB
ALBUMIN SERPL-MCNC: 4.4 G/DL (ref 3.6–5.1)
ALBUMIN/GLOB SERPL: 2.3 (CALC) (ref 1–2.5)
ALP SERPL-CCNC: 64 U/L (ref 36–130)
ALT SERPL-CCNC: 14 U/L (ref 9–46)
AST SERPL-CCNC: 17 U/L (ref 10–40)
BASOPHILS # BLD AUTO: 32 CELLS/UL (ref 0–200)
BASOPHILS NFR BLD AUTO: 0.7 %
BILIRUB SERPL-MCNC: 0.8 MG/DL (ref 0.2–1.2)
BUN SERPL-MCNC: 21 MG/DL (ref 7–25)
BUN/CREAT SERPL: ABNORMAL (CALC) (ref 6–22)
CALCIUM SERPL-MCNC: 9.1 MG/DL (ref 8.6–10.3)
CHLORIDE SERPL-SCNC: 103 MMOL/L (ref 98–110)
CHOLEST SERPL-MCNC: 182 MG/DL
CHOLEST/HDLC SERPL: 2.4 (CALC)
CO2 SERPL-SCNC: 30 MMOL/L (ref 20–32)
CREAT SERPL-MCNC: 1.14 MG/DL (ref 0.6–1.24)
EOSINOPHIL # BLD AUTO: 50 CELLS/UL (ref 15–500)
EOSINOPHIL NFR BLD AUTO: 1.1 %
ERYTHROCYTE [DISTWIDTH] IN BLOOD BY AUTOMATED COUNT: 11.7 % (ref 11–15)
GFR/BSA.PRED SERPLBLD CYS-BASED-ARV: 91 ML/MIN/1.73M2
GLOBULIN SER CALC-MCNC: 1.9 G/DL (CALC) (ref 1.9–3.7)
GLUCOSE SERPL-MCNC: 206 MG/DL (ref 65–99)
HBA1C MFR BLD: 7.5 % OF TOTAL HGB
HCT VFR BLD AUTO: 45 % (ref 38.5–50)
HDLC SERPL-MCNC: 75 MG/DL
HGB BLD-MCNC: 14.7 G/DL (ref 13.2–17.1)
LDLC SERPL CALC-MCNC: 93 MG/DL (CALC)
LYMPHOCYTES # BLD AUTO: 1697 CELLS/UL (ref 850–3900)
LYMPHOCYTES NFR BLD AUTO: 37.7 %
MCH RBC QN AUTO: 29.8 PG (ref 27–33)
MCHC RBC AUTO-ENTMCNC: 32.7 G/DL (ref 32–36)
MCV RBC AUTO: 91.3 FL (ref 80–100)
MONOCYTES # BLD AUTO: 536 CELLS/UL (ref 200–950)
MONOCYTES NFR BLD AUTO: 11.9 %
NEUTROPHILS # BLD AUTO: 2187 CELLS/UL (ref 1500–7800)
NEUTROPHILS NFR BLD AUTO: 48.6 %
NONHDLC SERPL-MCNC: 107 MG/DL (CALC)
PLATELET # BLD AUTO: 208 THOUSAND/UL (ref 140–400)
PMV BLD REES-ECKER: 10.8 FL (ref 7.5–12.5)
POTASSIUM SERPL-SCNC: 4.4 MMOL/L (ref 3.5–5.3)
PROT SERPL-MCNC: 6.3 G/DL (ref 6.1–8.1)
RBC # BLD AUTO: 4.93 MILLION/UL (ref 4.2–5.8)
SODIUM SERPL-SCNC: 139 MMOL/L (ref 135–146)
TRIGL SERPL-MCNC: 56 MG/DL
TSH SERPL-ACNC: 0.9 MIU/L (ref 0.4–4.5)
WBC # BLD AUTO: 4.5 THOUSAND/UL (ref 3.8–10.8)

## 2024-10-08 NOTE — RESULT ENCOUNTER NOTE
Please call the patient regarding abnormal result.  Hemoglobin A1c has elevated from 6.9 to 7.5.  Glucose was 206.  Kidney function and liver function look okay on CMP.  CBC and thyroid lab work looks okay

## 2024-11-06 ENCOUNTER — TELEPHONE (OUTPATIENT)
Dept: ENDOCRINOLOGY | Facility: HOSPITAL | Age: 24
End: 2024-11-06

## 2024-11-06 DIAGNOSIS — E10.9 TYPE 1 DIABETES MELLITUS WITHOUT COMPLICATION (HCC): ICD-10-CM

## 2024-11-06 DIAGNOSIS — E11.10 DKA (DIABETIC KETOACIDOSIS) (HCC): ICD-10-CM

## 2024-11-06 RX ORDER — INSULIN GLARGINE 100 [IU]/ML
24 INJECTION, SOLUTION SUBCUTANEOUS
Qty: 10 ML | Refills: 0 | Status: CANCELLED | OUTPATIENT
Start: 2024-11-06

## 2024-11-06 NOTE — TELEPHONE ENCOUNTER
Reason for call:   [x] Refill   [] Prior Auth  [] Other:     Office:[facility]    [] PCP/Provider -   [x] Specialty/Provider - Endocrinology/ Eliazar Franco      Medication: BD Pen Needle Renita, insulin glargine (LANTUS)     Dose/Frequency: 2nd Gen 32G X 4 MM MISC, 100 units/mL subcutaneous injection/  USE WITH INSULIN UP TO 5 TIMES DAILY AS DIRECTED,  Inject 24 Units under the skin daily at bedtime     Quantity: 100 day supply, 10 ml    Pharmacy: [facility]     Does the patient have enough for 3 days?   [x] Yes   [] No - Send as HP to POD

## 2024-11-07 RX ORDER — INSULIN GLARGINE 100 [IU]/ML
24 INJECTION, SOLUTION SUBCUTANEOUS
Qty: 10 ML | Refills: 0 | Status: SHIPPED | OUTPATIENT
Start: 2024-11-07

## 2024-11-07 RX ORDER — PEN NEEDLE, DIABETIC 32GX 5/32"
NEEDLE, DISPOSABLE MISCELLANEOUS
Qty: 500 EACH | Refills: 0 | Status: SHIPPED | OUTPATIENT
Start: 2024-11-07

## 2024-11-07 NOTE — TELEPHONE ENCOUNTER
Both Humalog and Novolog require a prior auth. Please advise which you would like a prior auth done for

## 2024-11-11 DIAGNOSIS — E10.10 TYPE 1 DIABETES MELLITUS WITH KETOACIDOSIS WITHOUT COMA (HCC): Primary | ICD-10-CM

## 2024-11-12 RX ORDER — INSULIN GLARGINE-YFGN 100 [IU]/ML
24 INJECTION, SOLUTION SUBCUTANEOUS
Qty: 15 ML | Refills: 0 | Status: SHIPPED | OUTPATIENT
Start: 2024-11-12

## 2024-11-12 RX ORDER — INSULIN LISPRO-AABC 100 [IU]/ML
INJECTION, SOLUTION SUBCUTANEOUS
Qty: 15 ML | Refills: 0 | Status: SHIPPED | OUTPATIENT
Start: 2024-11-12

## 2024-11-13 ENCOUNTER — TELEPHONE (OUTPATIENT)
Age: 24
End: 2024-11-13

## 2024-11-13 DIAGNOSIS — E10.10 TYPE 1 DIABETES MELLITUS WITH KETOACIDOSIS WITHOUT COMA (HCC): Primary | ICD-10-CM

## 2024-11-13 RX ORDER — INSULIN GLARGINE 100 [IU]/ML
INJECTION, SOLUTION SUBCUTANEOUS
Qty: 15 ML | Refills: 3 | Status: SHIPPED | OUTPATIENT
Start: 2024-11-13

## 2024-11-13 NOTE — TELEPHONE ENCOUNTER
Pt called. he made an appt for 11/15.  Pt will get labs tomorrow. Pt goes to [facility] [place]  can u please fax the labs to [facility] today    He needs a note also for work so he is able to have his phone on him. Due to the dexcom and his bs.      Please let pt know when labs are sent

## 2024-11-15 NOTE — TELEPHONE ENCOUNTER
Patient now has an appointment with you in March. Please advise what labs you want done for that appointment.

## 2024-11-15 NOTE — TELEPHONE ENCOUNTER
Letter completed. Left message advising patient it's ready and advise he doesn't need any labs done currently. He now doesn't have an appointment till March with Dr Montero. I advised I would ask her what labs he needs and put the orders in.

## 2024-11-20 DIAGNOSIS — E10.10 TYPE 1 DIABETES MELLITUS WITH KETOACIDOSIS WITHOUT COMA (HCC): Primary | ICD-10-CM

## 2025-02-10 DIAGNOSIS — E10.9 TYPE 1 DIABETES MELLITUS WITHOUT COMPLICATION (HCC): ICD-10-CM

## 2025-02-11 RX ORDER — PEN NEEDLE, DIABETIC 32GX 5/32"
NEEDLE, DISPOSABLE MISCELLANEOUS
Qty: 500 EACH | Refills: 1 | Status: SHIPPED | OUTPATIENT
Start: 2025-02-11

## 2025-02-17 DIAGNOSIS — E10.9 TYPE 1 DIABETES MELLITUS WITHOUT COMPLICATION (HCC): ICD-10-CM

## 2025-02-17 DIAGNOSIS — E10.10 TYPE 1 DIABETES MELLITUS WITH KETOACIDOSIS WITHOUT COMA (HCC): ICD-10-CM

## 2025-02-18 RX ORDER — INSULIN LISPRO 100 [IU]/ML
INJECTION, SOLUTION INTRAVENOUS; SUBCUTANEOUS
Qty: 15 ML | Refills: 2 | Status: SHIPPED | OUTPATIENT
Start: 2025-02-18

## 2025-02-19 RX ORDER — INSULIN LISPRO-AABC 100 [IU]/ML
INJECTION, SOLUTION SUBCUTANEOUS
Qty: 15 ML | Refills: 3 | Status: SHIPPED | OUTPATIENT
Start: 2025-02-19

## 2025-03-08 DIAGNOSIS — E10.65 TYPE 1 DIABETES MELLITUS WITH HYPERGLYCEMIA (HCC): ICD-10-CM

## 2025-03-10 RX ORDER — ACYCLOVIR 400 MG/1
1 TABLET ORAL
Qty: 9 EACH | Refills: 1 | Status: SHIPPED | OUTPATIENT
Start: 2025-03-10

## 2025-03-27 ENCOUNTER — RESULTS FOLLOW-UP (OUTPATIENT)
Dept: ENDOCRINOLOGY | Facility: HOSPITAL | Age: 25
End: 2025-03-27

## 2025-03-27 LAB
ALBUMIN SERPL-MCNC: 4.6 G/DL (ref 3.6–5.1)
ALBUMIN/CREAT UR: NORMAL MG/G CREAT
ALBUMIN/GLOB SERPL: 2.4 (CALC) (ref 1–2.5)
ALP SERPL-CCNC: 64 U/L (ref 36–130)
ALT SERPL-CCNC: 11 U/L (ref 9–46)
AST SERPL-CCNC: 17 U/L (ref 10–40)
BILIRUB SERPL-MCNC: 0.7 MG/DL (ref 0.2–1.2)
BUN SERPL-MCNC: 23 MG/DL (ref 7–25)
BUN/CREAT SERPL: ABNORMAL (CALC) (ref 6–22)
CALCIUM SERPL-MCNC: 9.5 MG/DL (ref 8.6–10.3)
CHLORIDE SERPL-SCNC: 100 MMOL/L (ref 98–110)
CO2 SERPL-SCNC: 27 MMOL/L (ref 20–32)
CREAT SERPL-MCNC: 1.2 MG/DL (ref 0.6–1.24)
CREAT UR-MCNC: 125 MG/DL (ref 20–320)
GFR/BSA.PRED SERPLBLD CYS-BASED-ARV: 86 ML/MIN/1.73M2
GLOBULIN SER CALC-MCNC: 1.9 G/DL (CALC) (ref 1.9–3.7)
GLUCOSE SERPL-MCNC: 253 MG/DL (ref 65–99)
HBA1C MFR BLD: 8.2 % OF TOTAL HGB
MICROALBUMIN UR-MCNC: <0.2 MG/DL
POTASSIUM SERPL-SCNC: 4.7 MMOL/L (ref 3.5–5.3)
PROT SERPL-MCNC: 6.5 G/DL (ref 6.1–8.1)
SODIUM SERPL-SCNC: 136 MMOL/L (ref 135–146)

## 2025-03-28 ENCOUNTER — OFFICE VISIT (OUTPATIENT)
Dept: ENDOCRINOLOGY | Facility: HOSPITAL | Age: 25
End: 2025-03-28
Payer: COMMERCIAL

## 2025-03-28 VITALS
HEIGHT: 72 IN | BODY MASS INDEX: 24.38 KG/M2 | SYSTOLIC BLOOD PRESSURE: 122 MMHG | WEIGHT: 180 LBS | DIASTOLIC BLOOD PRESSURE: 80 MMHG | HEART RATE: 78 BPM

## 2025-03-28 DIAGNOSIS — E10.10 TYPE 1 DIABETES MELLITUS WITH KETOACIDOSIS WITHOUT COMA (HCC): Primary | ICD-10-CM

## 2025-03-28 PROCEDURE — 99214 OFFICE O/P EST MOD 30 MIN: CPT | Performed by: INTERNAL MEDICINE

## 2025-03-28 PROCEDURE — 95251 CONT GLUC MNTR ANALYSIS I&R: CPT | Performed by: INTERNAL MEDICINE

## 2025-03-28 NOTE — PATIENT INSTRUCTIONS
Hgba1c  is 8.2%.It is going up.     Work on counting carb's more accurately.     Continue the 1 unit per 5-6 grams carbs, decrease 2-5 units if exercising after the meal. Try the 20 min pre meals when possible.    Try 1-2 units with breakfast ( eggs etc/no carb)    Continue the same lantus insulin.     Continue to use the dexcom.     Follow up in 3-4 months with blood work.

## 2025-03-28 NOTE — PROGRESS NOTES
3/30/2025    Assessment & Plan      Diagnoses and all orders for this visit:    Type 1 diabetes mellitus with ketoacidosis without coma (HCC)  -     Comprehensive metabolic panel; Future  -     Hemoglobin A1c (w/out EAG); Future  -     Comprehensive metabolic panel  -     Hemoglobin A1c (w/out EAG)          Assessment & Plan  1. Type 1 Diabetes Mellitus.  His hemoglobin A1c level is currently at 8.2, indicating suboptimal glycemic control. His average blood glucose levels have been progressively increasing over the past year. He has been advised to maintain adequate hydration throughout the day to prevent dehydration-related fatigue and headaches.  He will work on making sure to count his carbohydrates more accurately and continue with his 1 unit per 5 to 6 g carbohydrate dosage of Lyumjev but to decrease 2 to 5 units of exercising after the meal.  He will try to take insulin 20 minutes before meals whenever possible.  He will try about 1 to 2 units of Lyumjev with breakfast even if he is eating no carbs or very few carbs.  He will continue the same Lantus insulin dosage.  He has been instructed to verify the expiration date of his glucagon and inform us if it has  so that a refill can be provided. He has been counseled to administer 1 to 2 units of insulin when consuming eggs and hot sauce, and 3 units when consuming eggs and presley. He has been encouraged to continue using Dexcom for continuous glucose monitoring and to contact us via [employer] if his blood glucose levels are not within the desired range.    I have asked him to follow-up in 3 to 4 months with preceding hemoglobin A1c and CMP.      CC: Diabetes type I follow-up    History of Present Illness    HPI: Johnny Roper is a 24-year-old male with a history of type 1 diabetes, diagnosed in 2018, here for a follow-up visit. He was diagnosed when he had an episode of diabetic ketoacidosis requiring hospitalization.  He is accompanied by his mother  today.    He has no diabetic complications and reports no neuropathy, nephropathy, retinopathy, heart attack, stroke, or claudication.    His current medication regimen includes glargine insulin at 24 units per day and Lyumjev insulin administered at 6 units for breakfast and lunch, and 12 units for dinner. He adjusts his insulin dosage based on a sliding scale, using a ratio of 1 unit per 5 to 6 grams of carbohydrates with each meal. Additional insulin is administered when his blood glucose levels are elevated, with an extra 3 units for steady levels around 200, and an additional 4 units if the level is increasing.     He reports no other regular medications or vitamin intake. He has glucagon at home and carries glucose tablets with him. He reports no excessive urination, although he did experience increased frequency one night, which he attributes to high water intake. He does not typically experience nighttime urination, with the exception of one instance at 3 AM. He reports constant hunger but no excessive thirst. He experiences headaches when his water intake is low, particularly at work. He reports no blurry vision, numbness, tingling, or wounds on his feet. He regularly inspects his feet and avoids walking barefoot. He has been moisturizing his hands, elbows, and feet. He experienced chest pain yesterday, which he attributes to resuming boxing after a month-long break. The pain was not severe and was localized to the left of the sternum. He reports no swelling. His last eye examination was conducted a few years ago.     He admits to occasional inaccuracies in carbohydrate counting. He typically consumes breakfast around 8 AM and is on the road by 9 AM. His breakfast varies and may include hard-boiled eggs with hot sauce and salt, for which he does not take insulin due to the low carbohydrate content. He occasionally drinks coffee and has been reducing his intake. He also consumes two sandwiches in the morning  before work, for which he takes 12 to 14 units of insulin. He does not consume bread or toast. He has observed that his blood sugar levels increase when he reduces his insulin dosage from 20 to 15 units for meals over 60 carbs. He notes that his blood sugar levels decrease when he is active after taking insulin. He finds that a ratio of 1 unit per 5 grams of carbohydrates is effective most of the time.     Blood Sugar/Glucometer/Pump/CGM review: He utilizes a Dexcom G7 continuous glucose monitoring system to test his blood sugars throughout the day.  Dexcom download from 3/15/2025 through 3/28/2025 was reviewed in the office today.  CGM was active 94% of the time.  Average glucose is 172 mg/dL with a standard deviation of 75 mg/dL and a GMI of 7.4%.  60% of blood sugars are in target range, 24% high, 14% very high, 2% low, and 0% very low.  Overall Dexcom download does show elevated blood sugars starting around 8 AM until lunchtime and then they just fluctuate up and down with mild elevations in the late afternoon.  Blood sugars overnight though are quite stable.        Historical Information   Past Medical History:   Diagnosis Date    Diabetes mellitus (HCC)     Fracture of phalanx of toe     Resolved 2/1/2016      History reviewed. No pertinent surgical history.  Social History   Social History     Substance and Sexual Activity   Alcohol Use Yes    Comment: social     Social History     Substance and Sexual Activity   Drug Use No     Social History     Tobacco Use   Smoking Status Never   Smokeless Tobacco Never     Family History:   Family History   Problem Relation Age of Onset    Diabetes Family     No Known Problems Mother     No Known Problems Father     No Known Problems Sister     No Known Problems Brother     No Known Problems Brother     No Known Problems Brother     No Known Problems Sister        Meds/Allergies   Current Outpatient Medications   Medication Sig Dispense Refill    Accu-Chek Softclix  Lancets lancets Check blood sugars 4 times a day 400 each 2    acetone, urine, test strip 1 strip by Does not apply route as needed for high blood sugar 25 each 4    Continuous Glucose Sensor (Dexcom G7 Sensor) USE 1 DEVICE EVERY 10 DAYS 9 each 1    glucagon (GLUCAGON EMERGENCY) 1 MG injection Inject 1 mg under the skin once as needed for low blood sugar for up to 1 dose 1 each 5    Insulin Glargine Solostar 100 UNIT/ML SOPN Inject 24 units daily 15 mL 3    Insulin Lispro-aabc, 1 U Dial, (Lyumjev KwikPen) 100 UNIT/ML SOPN 6 UNITS WITH BREAKFAST AND LUNCH AND 12 UNITS WITH DINNER. 15 mL 3    Insulin Pen Needle (BD Pen Needle Renita 2nd Gen) 32G X 4 MM MISC USE FIVE TIMES DAILY 500 each 1    Continuous Blood Gluc  (DEXCOM G6 ) SHAYNA Use  to scan blood sugars daily. (Patient not taking: Reported on 5/17/2024) 1 Device 0    Continuous Blood Gluc Transmit (Dexcom G6 Transmitter) MISC USE AS DIRECTED TO TEST BLOOD SUGARS (Patient not taking: No sig reported) 1 each 3    insulin aspart, w/niacinamide, (Fiasp FlexTouch) 100 Units/mL injection pen Utilize up to 50 units daily (Patient not taking: Reported on 8/30/2024) 30 mL 6    neomycin-polymyxin-hydrocortisone (CORTISPORIN) 1 % SOLN Administer 3 drops to the right ear every 8 (eight) hours for 3 days 1.4 mL 0     No current facility-administered medications for this visit.     No Known Allergies    Objective   Vitals: Blood pressure 122/80, pulse 78, height 6' (1.829 m), weight 81.6 kg (180 lb).  Invasive Devices       None                   Physical Exam    Thyroid is normal in size. No palpable nodules.  Lungs are clear to auscultation.  Heart has a regular rate and rhythm. No murmurs.  No lower extremity edema.      The history was obtained from the review of the chart and from the patient.    Lab Results:    Most recent Alc is  Lab Results   Component Value Date    HGBA1C 8.2 (H) 03/26/2025           Blood work performed on 3/26/2025 showed a CMP  with a glucose of 253 fasting was otherwise normal.    Urine microalbumin to creatinine ratio is below the lower limit of detection at less than 0.2 mg/dL.    Lab Results   Component Value Date    CREATININE 1.20 03/26/2025    CREATININE 1.14 10/07/2024    CREATININE 0.94 01/22/2024    BUN 23 03/26/2025    K 4.7 03/26/2025     03/26/2025    CO2 27 03/26/2025     eGFR   Date Value Ref Range Status   03/26/2025 86 > OR = 60 mL/min/1.73m2 Final   03/08/2023 123 ml/min/1.73sq m Final         Lab Results   Component Value Date    HDL 75 10/07/2024    TRIG 56 10/07/2024       Lab Results   Component Value Date    ALT 11 03/26/2025    AST 17 03/26/2025    ALKPHOS 64 03/26/2025       Lab Results   Component Value Date    TSH 1.540 04/28/2020             Future Appointments   Date Time Provider Department Center   7/11/2025  7:45 AM RADHA Ford [facility]

## 2025-06-26 DIAGNOSIS — E10.10 TYPE 1 DIABETES MELLITUS WITH KETOACIDOSIS WITHOUT COMA (HCC): ICD-10-CM

## 2025-06-27 RX ORDER — INSULIN GLARGINE-YFGN 100 [IU]/ML
24 INJECTION, SOLUTION SUBCUTANEOUS DAILY
Qty: 15 ML | Refills: 2 | Status: SHIPPED | OUTPATIENT
Start: 2025-06-27

## 2025-07-11 ENCOUNTER — TELEPHONE (OUTPATIENT)
Age: 25
End: 2025-07-11

## 2025-07-11 NOTE — TELEPHONE ENCOUNTER
Eye Care Provider Questions    Explained difference between between Optometrist and Ophthalmologist. Explained reasons why a diabetic should have yearly exam by Ophthalmologist. Provided names of Ophthalmologist that providers have referred patients to. Patient's mom had no further questions and/or concerns at this time.

## 2025-07-22 LAB
LEFT EYE DIABETIC RETINOPATHY: NORMAL
RIGHT EYE DIABETIC RETINOPATHY: NORMAL